# Patient Record
Sex: MALE | Race: WHITE | Employment: OTHER | URBAN - METROPOLITAN AREA
[De-identification: names, ages, dates, MRNs, and addresses within clinical notes are randomized per-mention and may not be internally consistent; named-entity substitution may affect disease eponyms.]

---

## 2012-08-16 LAB — COLONOSCOPY, EXTERNAL: NORMAL

## 2017-01-05 ENCOUNTER — OFFICE VISIT (OUTPATIENT)
Dept: NEUROLOGY | Age: 67
End: 2017-01-05

## 2017-01-05 DIAGNOSIS — R41.3 MEMORY LOSS: Primary | ICD-10-CM

## 2017-01-05 DIAGNOSIS — F43.21 ADJUSTMENT DISORDER WITH DEPRESSED MOOD: ICD-10-CM

## 2017-01-05 DIAGNOSIS — F10.11 HISTORY OF ALCOHOL ABUSE: ICD-10-CM

## 2017-01-05 NOTE — PROGRESS NOTES
[address] [facility],5Th Floor  15[address] [facility]   [facility] [address] [facility]   Jaswant Villagran    [phone] Office   [phone] Fax      Neuropsychology    Initial Diagnostic Interview Note      Referral:  Linda Cuenca MD, Dr. Laura Rosen. is a 77 y.o.  right handed   male who was unaccompanied to the initial clinical interview on 1/5/17. Please refer to his medical records for details pertaining to his history. Briefly, the patient reported that he completed high school and is a prior service [employer]. Left school early, went into the [employer] about age 16, and took some courses. He obtained high school diploma. For about a year now that he is aware, he has noticed increased forgetfulness getting worse over time. Loss of memory. He has had periods where he feels buzzed out. Saw Ely Gregory, did MMSE, and his recall was only 1/3. Either he misunderstood what he was supposed to do or just forgot. He doesn't know. He says he only remembered the first word. He does not recall specifics, thinks it was an activity. Periods of time when he feels aggravated, embarrassed by it. Doing things at home, he will forget his honey-do list.  Juan C Hones melancholy about it. Lost $2000.00. Lost a small gun/pistol that he had, and took a month to find that. He forgets certain events. Couldn't remember if he has had any major known neurologic problems. Has gotten banged up in his head, didn't bleed or lose consciousness. This happened maybe a month ago? Has some pain, arthritis issues. Forgets what he's supposed to do. Repeats himself. Says his driving is not a problem (but see Dr. Rafia Jacobson notes copied below). Denied ever getting lost when driving or turned around. Denied accidents. He gets assistance with medications, and spouse has always done the finances. Feels off balance sometimes. Has had some falls. Appetite isn't too bad. Emotionally, he reports doing not bad, not like before. Not on any meds for mood. No counseling/psychiatrist currently. Sleep is an issue. Not sleeping in same bed with spouse. Will watch tv, nap, up and down at night. History somewhat difficult to obtain, so I reviewed also Dr. Yanet Miramontes' notes, which are copied below. From Dr. Yanet Miramontes: 70-year-old right-handed gentleman presents for evaluation today accompanied by his wife for evaluation of what he calls memory loss. He says he started to have trouble about 2-2.5 years ago and he believes things have gotten progressively worse. He says that his stepfather had dementia and he is seeing the same types of things and himself as he saw on his stepfather. He says he can remember long-term event such as things that occurred in [facility] but he has difficulty remembering short-term things. He has been evaluated with Chadd Leon and he describes having a Mini-Mental status exam specifically a mini cog and notes that he had difficulty with the item recall only recalling 1 out of 3 items. His wife tells me that he repeats the same question over and over. He will tell the same story over and over. He will forget conversations. She relates the example of them going somewhere and then discussing it but then him asking over and over where they were going. He continues to drive. No issues with accidents or near accidents. He was supposed to be over at a veterans event on the [address] [place] [address] on [facility]. His wife says that this is an area of town to go to a lot because there is a restaurant on [address] where they like to eat. He was driving there and his wife actually put the directions into his phone for him as well and he was unable to find [facility]. He called a friend of his who was already there and still could not find his way. He also notes that in the middle of conversations he will forget what he is saying. He says that he misplaces items frequently as well.  Terms of family history of dementia he does not know anything about his father's side of the family. He did not know his father at all. His mother as above was said to have some psychiatric issues. Essentially family history is not known in this regard. MMSE:  Recall 1/3 as with Ms. Jace Garcia 3/5    Clock: slow to complete, hands set incorrectly    TOPF:  52    No previous neuropsych     Historian: Fair  Praxis: No UE apraxia  R/L Orientation: Intact to self and to other  Dress: within normal limits   Weight: within normal limits   Appearance/Hygiene: within normal limits   Gait: within normal limits   Assistive Devices: Glasses  Mood: within normal limits   Affect: within normal limits   Comprehension: mildly impaired  Thought Process: He is tangential  Expressive Language: within normal limits   Receptive Language: within normal limits   Motor:  No cognitive or motor perseveration  ETOH: Does not drink anymore, then later says he gets a beer every so often  Had a period of time for 3-4 years when he quit drinking, he had a drinking problem, right around the time he retired. History of ETOH abuse in the past also, related to trauma - he says, [place] issues.   No hard liquor  Tobacco: 1 ppd  Illicit: Denied  SI/HI: Denied  Psychosis: Denied  Insight: Fair  Judgment:  TBD  Other Psych:      Past Medical History   Diagnosis Date    [facility] ([facility]     CAD (coronary artery disease) 6/15/2010    DM (diabetes mellitus) ([facility] 6/15/2010    Falls     Heart disease     HTN (hypertension) 6/15/2010    Hypercholesteremia 6/15/2010    PTSD (post-traumatic stress disorder)     Skin cancer 6/15/2010    Smoker 6/15/2010    Snoring        Past Surgical History   Procedure Laterality Date    Pr cardiac surg procedure unlist       stent       No Known Allergies    Family History   Problem Relation Age of Onset    Cancer Mother     Heart Disease Other        Social History   Substance Use Topics    Smoking status: Current Every Day Smoker     Packs/day: 1.00    Smokeless tobacco: Never Used    Alcohol use 2.4 oz/week     4 Cans of beer per week       Current Outpatient Prescriptions   Medication Sig Dispense Refill    DOCOSAHEXANOIC ACID/EPA (FISH OIL PO) Take 1,200 mg by mouth daily.  PV W-O LAZARUS/FERROUS FUMARATE/FA (M-VIT PO) Take  by mouth.  POTASSIUM PO Take 550 mg by mouth.  rosuvastatin (CRESTOR) 20 mg tablet Take 1 Tab by mouth daily. 90 Tab 3    metoprolol succinate (TOPROL XL) 50 mg XL tablet Take 1 Tab by mouth daily. 90 Tab 3    niacin ER (NIASPAN) 500 mg tablet Take 1 Tab by mouth nightly. 90 Tab 3    OMEGA-3 FATTY ACIDS (OMEGA 3 PO) Take  by mouth.  aspirin (ASPIRIN) 325 mg tablet Take 325 mg by mouth daily. Plan:  Obtain authorization for testing from insurance company. Report to follow once testing, scoring, and interpretation completed. ? Organic based neurocognitive issues versus mood disorder or combination of same. ? Problems organic, functional, or both? This note will not be viewable in [address]

## 2017-01-19 ENCOUNTER — OFFICE VISIT (OUTPATIENT)
Dept: NEUROLOGY | Age: 67
End: 2017-01-19

## 2017-01-19 DIAGNOSIS — F10.11 HISTORY OF ALCOHOL ABUSE: ICD-10-CM

## 2017-01-19 DIAGNOSIS — F02.80 EARLY ONSET ALZHEIMER'S DEMENTIA WITHOUT BEHAVIORAL DISTURBANCE (HCC): ICD-10-CM

## 2017-01-19 DIAGNOSIS — G30.0 EARLY ONSET ALZHEIMER'S DEMENTIA WITHOUT BEHAVIORAL DISTURBANCE (HCC): ICD-10-CM

## 2017-01-19 DIAGNOSIS — F41.1 GENERALIZED ANXIETY DISORDER: ICD-10-CM

## 2017-01-19 DIAGNOSIS — R41.3 MEMORY LOSS: Primary | ICD-10-CM

## 2017-01-25 NOTE — PROGRESS NOTES
[address] [facility],5Th Floor  [address] [facility]   [address] [facility] [address] [facility]   Jaswant Villagran   [phone] Office   [phone] Fax      Neuropsychological Evaluation Report      Referral:  Lake Solorio MD, Dr. Yevgeniy Banda. is a 77 y.o.  right handed   male who was unaccompanied to the initial clinical interview on 1/5/17. Please refer to his medical records for details pertaining to his history. Briefly, the patient reported that he completed high school and is a prior service [employer]. Left school early, went into the [employer] about age 16, and took some courses. He obtained high school diploma. For about a year now that he is aware, he has noticed increased forgetfulness getting worse over time. Loss of memory. He has had periods where he feels buzzed out. Saw Chacorta Hoffman, did MMSE, and his recall was only 1/3. Either he misunderstood what he was supposed to do or just forgot. He doesn't know. He says he only remembered the first word. He does not recall specifics, thinks it was an activity. Periods of time when he feels aggravated, embarrassed by it. Doing things at home, he will forget his honey-do list.  Dirk Never melancholy about it. Lost $2000.00. Lost a small gun/pistol that he had, and took a month to find that. He forgets certain events. Couldn't remember if he has had any major known neurologic problems. Has gotten banged up in his head, didn't bleed or lose consciousness. This happened maybe a month ago? Has some pain, arthritis issues. Forgets what he's supposed to do. Repeats himself. Says his driving is not a problem (but see Dr. Johana Howard notes copied below). Denied ever getting lost when driving or turned around. Denied accidents. He gets assistance with medications, and spouse has always done the finances. Feels off balance sometimes. Has had some falls. Appetite isn't too bad.  Emotionally, he reports doing not bad, not like before. Not on any meds for mood. No counseling/psychiatrist currently. Sleep is an issue. Not sleeping in same bed with spouse. Will watch tv, nap, up and down at night. History somewhat difficult to obtain, so I reviewed also Dr. Joselo Del Rosario' notes, which are copied below. From Dr. Joselo Del Rosario: 80-year-old right-handed gentleman presents for evaluation today accompanied by his wife for evaluation of what he calls memory loss. He says he started to have trouble about 2-2.5 years ago and he believes things have gotten progressively worse. He says that his stepfather had dementia and he is seeing the same types of things and himself as he saw on his stepfather. He says he can remember long-term event such as things that occurred in [facility] but he has difficulty remembering short-term things. He has been evaluated with Theresa Cabrera and he describes having a Mini-Mental status exam specifically a mini cog and notes that he had difficulty with the item recall only recalling 1 out of 3 items. His wife tells me that he repeats the same question over and over. He will tell the same story over and over. He will forget conversations. She relates the example of them going somewhere and then discussing it but then him asking over and over where they were going. He continues to drive. No issues with accidents or near accidents. He was supposed to be over at a veterans event on the [address] [place] [address] on [facility]. His wife says that this is an area of town to go to a lot because there is a restaurant on [address] where they like to eat. He was driving there and his wife actually put the directions into his phone for him as well and he was unable to find [facility]. He called a friend of his who was already there and still could not find his way. He also notes that in the middle of conversations he will forget what he is saying. He says that he misplaces items frequently as well.  Terms of family history of dementia he does not know anything about his father's side of the family. He did not know his father at all. His mother as above was said to have some psychiatric issues. Essentially family history is not known in this regard. MMSE:  Recall 1/3 as with Ms. Bartholomew Bowels 3/5    Clock: slow to complete, hands set incorrectly    TOPF:  52    No previous neuropsych     Historian: Fair  Praxis: No UE apraxia  R/L Orientation: Intact to self and to other  Dress: within normal limits   Weight: within normal limits   Appearance/Hygiene: within normal limits   Gait: within normal limits   Assistive Devices: Glasses  Mood: within normal limits   Affect: within normal limits   Comprehension: mildly impaired  Thought Process: He is tangential  Expressive Language: within normal limits   Receptive Language: within normal limits   Motor:  No cognitive or motor perseveration  ETOH: Does not drink anymore, then later says he gets a beer every so often  Had a period of time for 3-4 years when he quit drinking, he had a drinking problem, right around the time he retired. History of ETOH abuse in the past also, related to trauma - he says, [place] issues.   No hard liquor  Tobacco: 1 ppd  Illicit: Denied  SI/HI: Denied  Psychosis: Denied  Insight: Fair  Judgment:  TBD  Other Psych:      Past Medical History   Diagnosis Date    [facility]ism ([facility] [address]     CAD (coronary artery disease) 6/15/2010    DM (diabetes mellitus) ([facility] 6/15/2010    Falls     Heart disease     HTN (hypertension) 6/15/2010    Hypercholesteremia 6/15/2010    PTSD (post-traumatic stress disorder)     Skin cancer 6/15/2010    Smoker 6/15/2010    Snoring        Past Surgical History   Procedure Laterality Date    Pr cardiac surg procedure unlist       stent       No Known Allergies    Family History   Problem Relation Age of Onset    Cancer Mother     Heart Disease Other        Social History   Substance Use Topics    Smoking status: Current Every Day Smoker     Packs/day: 1.00    Smokeless tobacco: Never Used    Alcohol use 2.4 oz/week     4 Cans of beer per week       Current Outpatient Prescriptions   Medication Sig Dispense Refill    DOCOSAHEXANOIC ACID/EPA (FISH OIL PO) Take 1,200 mg by mouth daily.  PV W-O LAZARUS/FERROUS FUMARATE/FA (M-VIT PO) Take  by mouth.  POTASSIUM PO Take 550 mg by mouth.  rosuvastatin (CRESTOR) 20 mg tablet Take 1 Tab by mouth daily. 90 Tab 3    metoprolol succinate (TOPROL XL) 50 mg XL tablet Take 1 Tab by mouth daily. 90 Tab 3    niacin ER (NIASPAN) 500 mg tablet Take 1 Tab by mouth nightly. 90 Tab 3    OMEGA-3 FATTY ACIDS (OMEGA 3 PO) Take  by mouth.  aspirin (ASPIRIN) 325 mg tablet Take 325 mg by mouth daily. Plan:  Obtain authorization for testing from insurance company. Report to follow once testing, scoring, and interpretation completed. ? Organic based neurocognitive issues versus mood disorder or combination of same. ? Problems organic, functional, or both? This note will not be viewable in [address] Neuropsychological Test Results  Patient Testing 1/19/17 Report Completed 1/25/17  A Psychometrist Assisted w/ portions of this evaluation while under my direct  supervision    The following evaluation procedures/tests were administered:      Neuropsychologist Administered/Interpreted:  Neuropsychological Mental Status Exam, Revised Memory & Behavior Checklist,  Mini Mental Status Exam, Clock Drawing Test, Test Of Premorbid Functioning, Elise-Melzack Pain Questionnaire, History Taking  & Clinical Interview With The Patient, Review Of Available Records.     Psychometrist Administered under Neuropsychologist Supervision & Neuropsychologist Interpreted:  Verbal Fluency Tests, Emerson & Emerson - Revised, Trailmaking Test Parts A & B, Wechsler Adult Intelligence Scale - IV, Sun Continuous Performance Test - III, [place] [employer] - II, Grooved Pegboard, Garza Depression Inventory - II, Garza Anxiety Inventory, Personality Assessment Inventory. Test Findings:  Test Findings:  Note:  The patients raw data have been compared with currently available norms which include demographic corrections for age, gender, and/or education. Sometimes, the patients scores are compared to demographically similar individuals as close to the patients age, education level, etc., as possible. \"Average\" is viewed as being +/- 1 standard deviation (SD) from the stated mean for a particular test score. \"Low average\" is viewed as being between 1 and 2 SD below the mean, and above average is viewed as being 1 and 2 SD above the mean. Scores falling in the borderline range (between 1-1/2 and 2 SD below the mean) are viewed with particular attention as to whether they are normal or abnormal neurocognitive test scores. Other methods of inference in analyzing the test data are also utilized, including the pattern and range of scores in the profile, bilateral motor functions, and the presence, if any, of pathognomonic signs. Behaviorally, the patient was friendly and cooperative and appeared motivated to perform well during this examination. Within this context, the results of this evaluation are viewed as a valid reflection of the patients actual neurocognitive and emotional status. His MMSE score of 16/30 correct was impaired. In this regard, he was not oriented to date or county. Registration of three words was 0/3 correct on Trial 1. Serial 7s were 1/5 correct. Recall for three words after a brief delay was 1/3 correct. Comprehension and carry out of a three step command was 2/3 correct. Reading was impaired. Clock drawing was impaired (scanned into media section of this EMR). His structured word list fluency, as assessed by the FAS Test, was within the severely impaired range with a T score of 19.   Category fluency was within the moderately to severely impaired range with a T score of 24. Confrontation naming ability, as assessed by the [facility] - Revised, was within the moderately impaired range at 32/60 correct (T = 29). This pattern of performance is indicative of a patient who is at increased risk for day-to-day problems with verbal fluency and confrontation naming. The patient was administered the Sun Continuous Performance Test - III,a computer administered test of sustained attention, and review of the subscales within this instrument revealed mild concerns for inattentiveness without impulsivity. This pattern of performance is indicative of a patient who is at increased risk for day-to-day problems with sustained visual attention/concentration. The patient is showing problems with working memory capacity (1st  %ile) and especially processing speed (0.2nd %ile) on the WAIS-IV. His Verbal Comprehension Index score of 74 was borderline. His Perceptual Reasoning Index score of 71 was also borderline. These scores reflect a decline in functioning based on an assessment of premorbid functioning. The patient was administered the [address] Test  - II and generated an impaired range (and flat) learning curve over five repeated auditory word list learning trials. An interference trial was within the normal range. Free and cued, short and long delayed recall were all impaired. Recognition recall was impaired, as was his forced choice recall (borderline). No concern for malingering, however. This pattern of performance is indicative of a patient who is at increased risk for day-to-day problems with auditory learning and memory. Simple timed visual motor sequencing (Trailmaking Test Part A) was within the moderately to severely impaired range with a T score of 21.   His performance on a similar, but more complex task of timed visual motor sequencing (Trailmaking Test Part B) was within the severely impaired range with a T score of 17. This latter test was discontinued. Taken together, this pattern of performance is indicative of a patient who is at increased risk for day-to-day problems with executive functioning. Fine motor dexterity was within the mildly to moderately impaired range for his dominant hand and normal for his nondominant hand. This potentially reflects a lateralized issue for which neurologic correlation is indicated. The patient rated his current level of pain as \"0/5 - No Pain\" on the Elise-Melzack Pain Questionnaire. His Garza Depression Inventory- II score of 5 was within the normal range. His Garza Anxiety Inventory score of 12 reflected mild anxiety. The patient was administered the Personality Assessment Inventory and generated a valid profile for interpretation. Within this context, this is a fairly normal profile, though mild anxiety is present and he reports that alcohol use has been a source of difficulty for him in his life. The personality profile is otherwise normal.  .      Impressions & Recommendations: This patient generated an abnormal range Neuropsychological Evaluation with respect to neurocognitive functioning. In this regard, he is showing problems with mental status, verbal fluency, confrontation naming, sustained attention, processing speed, working memory, perceptual reasoning, verbal comprehension, dominant handed motor dexterity (a potentially lateralized issue for which neurologic correlation is indicated) auditory learning, auditory memory, and executive functioning. Casual language skills will serve to mask underlying cognitive deficits at times. Emotionally, the patient reported mild anxiety. He reports a history of alcohol abuse when younger as well. In my opinion, this profile is consistent with an evolving organic process that is currently at a mild to moderate level of severity. This is likely a combination of AD and vascular dementia.   I do not see this as FTD. In addition to continued medical care, my recommendations include consideration for memory management medication. I also recommend psychiatric medication management for anxiety. The patient should be encouraged to remain as mentally, socially, and physically active as possible. The patient's generated cognitive difficulties are significant to the degree whereby it is my opinion that he should not live independently without appropriate supervision for those domains with a heavy memory emphasis. This includes medication management supervision and supervision of financial dealings. We will need to monitor driving safety over time, and he should use a GPS device in the interim. I do not believe that he is competent to manage his own affairs at this time. Baseline now established. Follow up prn. Clinical correlation is, of course, indicated. I will discuss these findings with the patient and family when they follow up with me in the near future. A follow up Neuropsychological Evaluation is indicated on a prn basis. DIAGNOSES: Dementia - Mild To Moderate    Anxiety - Mild     The above information is based upon information currently available to me. If there is any additional information of which I am currently unaware, I would be more than happy to review it upon having it made available to me. Thank you for the opportunity to see this interesting individual.     Sincerely,       Connie Vigil. Vero Pichardo PsyD, EdS      Attachments:  IQ Test Results (In Media Section Of This EMR)    Cc: Grace Menard MD, Dr. Yanet Miramontes    2 [id number]  Record review. Review of history provided by patient. Review of collaborative information. Testing by Clinician. Review of raw data. Scoring. Report writing of individual tests administered by Clinician.   Integration of individual tests administered by psychometrist (that were previously reported and billed under psychometry code below) with testing by clinician and review of records/history/collaborative information. Case Conceptualization, Report writing. Coordination Of Care. 4 units  -[id number] - Psychometrist test prep, administration, and scoring under clinician's direct supervision. Clinical interpretation of individual tests administered by psychometrist .  Clinician report of individual tests administered by psychometrist.    \"Unit\" is defined by CPT/National Guidelines (31 - 60 minutes). Integral services including scoring of raw data, data interpretation, case conceptualization, report writing etcetera were initiated after the patient finished testing/raw data collected and was completed on the date the report was signed.

## 2017-02-08 ENCOUNTER — OFFICE VISIT (OUTPATIENT)
Dept: NEUROLOGY | Age: 67
End: 2017-02-08

## 2017-02-08 ENCOUNTER — TELEPHONE (OUTPATIENT)
Dept: MEDSURG UNIT | Age: 67
End: 2017-02-08

## 2017-02-08 VITALS
HEIGHT: 73 IN | WEIGHT: 185.2 LBS | HEART RATE: 63 BPM | OXYGEN SATURATION: 98 % | DIASTOLIC BLOOD PRESSURE: 72 MMHG | SYSTOLIC BLOOD PRESSURE: 120 MMHG | BODY MASS INDEX: 24.55 KG/M2 | RESPIRATION RATE: 18 BRPM

## 2017-02-08 DIAGNOSIS — F03.A0 MILD DEMENTIA: ICD-10-CM

## 2017-02-08 DIAGNOSIS — F03.A0 MILD DEMENTIA: Primary | ICD-10-CM

## 2017-02-08 DIAGNOSIS — I65.23 BILATERAL CAROTID ARTERY STENOSIS: Primary | ICD-10-CM

## 2017-02-08 RX ORDER — DONEPEZIL HYDROCHLORIDE 5 MG/1
TABLET, FILM COATED ORAL
Qty: 30 TAB | Refills: 0 | Status: SHIPPED | OUTPATIENT
Start: 2017-02-08 | End: 2017-04-07

## 2017-02-08 RX ORDER — DONEPEZIL HYDROCHLORIDE 10 MG/1
10 TABLET, FILM COATED ORAL
Qty: 30 TAB | Refills: 5 | Status: SHIPPED | OUTPATIENT
Start: 2017-02-08 | End: 2017-04-07

## 2017-02-08 NOTE — MR AVS SNAPSHOT
Visit Information Date & Time Provider Department Dept. Phone Encounter #  
 2/8/2017  9:00 AM Sohail Dukes NP [facility] [phone] [id number] Follow-up Instructions Return in about 2 months (around 4/8/2017). Upcoming Health Maintenance Date Due Hepatitis C Screening 1950 DTaP/Tdap/Td series (1 - Tdap) 4/1/1971 ZOSTER VACCINE AGE 60> 4/1/2010 HEMOGLOBIN A1C Q6M 5/24/2016 COLONOSCOPY 6/21/2016 MEDICARE YEARLY EXAM 11/24/2016 FOOT EXAM Q1 11/24/2016 MICROALBUMIN Q1 11/24/2016 LIPID PANEL Q1 11/24/2016 Pneumococcal 65+ High/Highest Risk (2 of 2 - PPSV23) 12/20/2016 EYE EXAM RETINAL OR DILATED Q1 4/7/2017 GLAUCOMA SCREENING Q2Y 4/7/2018 Allergies as of 2/8/2017  Review Complete On: 2/8/2017 By: Bartolo Abrams LPN No Known Allergies Current Immunizations  Reviewed on 11/24/2015 Name Date Influenza Vaccine 9/22/2016, 10/5/2014 Influenza Vaccine Split 11/26/2012, 12/20/2011 Influenza Vaccine Whole 12/20/2011 Pneumococcal Conjugate (PCV-13) 11/24/2015 Pneumococcal Vaccine (Unspecified Type) 12/20/2011, 12/20/2011 Not reviewed this visit You Were Diagnosed With   
  
 Codes Comments Mild dementia    -  Primary ICD-10-CM: F03.90 ICD-9-CM: 294.20 Vitals BP Pulse Resp Height(growth percentile) Weight(growth percentile) SpO2  
 120/72 63 18 6' 1\" (1.854 m) 185 lb 3.2 oz (84 kg) 98% BMI Smoking Status 24.43 kg/m2 Current Every Day Smoker BMI and BSA Data Body Mass Index Body Surface Area  
 24.43 kg/m 2 2.08 m 2 Preferred Pharmacy Pharmacy Name Phone [facility] [address], [address] [phone] Your Updated Medication List  
  
   
This list is accurate as of: 2/8/17  9:30 AM.  Always use your most recent med list.  
  
  
  
  
 aspirin 325 mg tablet Commonly known as:  ASPIRIN Take 325 mg by mouth daily. * donepezil 5 mg tablet Commonly known as:  ARICEPT Take 1 tablet by mouth nightly * donepezil 10 mg tablet Commonly known as:  ARICEPT Take 1 Tab by mouth nightly. FISH OIL PO Take 1,200 mg by mouth daily. M-VIT PO Take  by mouth.  
  
 metoprolol succinate 50 mg XL tablet Commonly known as:  TOPROL XL Take 1 Tab by mouth daily. niacin  mg tablet Commonly known as:  Niaspan Take 1 Tab by mouth nightly. OMEGA 3 PO Take  by mouth. POTASSIUM PO Take 550 mg by mouth. rosuvastatin 20 mg tablet Commonly known as:  CRESTOR Take 1 Tab by mouth daily. * Notice: This list has 2 medication(s) that are the same as other medications prescribed for you. Read the directions carefully, and ask your doctor or other care provider to review them with you. Prescriptions Sent to Pharmacy Refills  
 donepezil (ARICEPT) 5 mg tablet 0 Sig: Take 1 tablet by mouth nightly Class: Normal  
 Pharmacy: [facility] [address] [place] [address] [address] AT [facility] Ph #: [phone]  
 donepezil (ARICEPT) 10 mg tablet 5 Sig: Take 1 Tab by mouth nightly. Class: Normal  
 Pharmacy: [facility] [address] Ph #: [phone] Route: Oral  
  
Follow-up Instructions Return in about 2 months (around 4/8/2017). To-Do List   
 02/08/2017 Imaging:  DUPLEX CAROTID BILATERAL AMB NEURO Patient Instructions PRESCRIPTION REFILL POLICY [facility] Statement to Patients April 1, 2014 In an effort to ensure the large volume of patient prescription refills is processed in the most efficient and expeditious manner, we are asking our patients to assist us by calling your Pharmacy for all prescription refills, this will include also your  Mail Order Pharmacy. The pharmacy will contact our office electronically to continue the refill process. Please do not wait until the last minute to call your pharmacy. We need at least 48 hours (2days) to fill prescriptions. We also encourage you to call your pharmacy before going to  your prescription to make sure it is ready. With regard to controlled substance prescription refill requests (narcotic refills) that need to be picked up at our office, we ask your cooperation by providing us with at least 72 hours (3days) notice that you will need a refill. We will not refill narcotic prescription refill requests after 4:00pm on any weekday, Monday through Thursday, or after 2:00pm on Fridays, or on the weekends. We encourage everyone to explore another way of getting your prescription refill request processed using Earth[employer], our patient web portal through our electronic medical record system. EarthLink is an efficient and effective way to communicate your medication request directly to the office and  downloadable as an kevin on your smart phone . EarthLink also features a review functionality that allows you to view your medication list as well as leave messages for your physician. Are you ready to get connected? If so please review the attatched instructions or speak to any of our staff to get you set up right away! Thank you so much for your cooperation. Should you have any questions please contact our Practice Administrator. The Physicians and Staff,  [facility] Lidia Arguelles [place] [address] What is a living will? A living will is a legal form you use to write down the kind of care you want at the end of your life. It is used by the health professionals who will treat you if you aren't able to decide for yourself.  
If you put your wishes in writing, your loved ones and others will know what kind of care you want. They won't need to guess. This can ease your mind and be helpful to others. A living will is not the same as an estate or property will. An estate will explains what you want to happen with your money and property after you die. Is a living will a legal document? A living will is a legal document. Each state has its own laws about living luther. If you move to another state, make sure that your living will is legal in the state where you now live. Or you might use a universal form that has been approved by many states. This kind of form can sometimes be completed and stored online. Your electronic copy will then be available wherever you have a connection to the Internet. In most cases, doctors will respect your wishes even if you have a form from a different state. · You don't need an  to complete a living will. But legal advice can be helpful if your state's laws are unclear, your health history is complicated, or your family can't agree on what should be in your living will. · You can change your living will at any time. Some people find that their wishes about end-of-life care change as their health changes. · In addition to making a living will, think about completing a medical power of  form. This form lets you name the person you want to make end-of-life treatment decisions for you (your \"health care agent\") if you're not able to. Many hospitals and nursing homes will give you the forms you need to complete a living will and a medical power of . · Your living will is used only if you can't make or communicate decisions for yourself anymore. If you become able to make decisions again, you can accept or refuse any treatment, no matter what you wrote in your living will. · Your state may offer an online registry. This is a place where you can store your living will online so the doctors and nurses who need to treat you can find it right away. What should you think about when creating a living will? Talk about your end-of-life wishes with your family members and your doctor. Let them know what you want. That way the people making decisions for you won't be surprised by your choices. Think about these questions as you make your living will: · Do you know enough about life support methods that might be used? If not, talk to your doctor so you know what might be done if you can't breathe on your own, your heart stops, or you're unable to swallow. · What things would you still want to be able to do after you receive life-support methods? Would you want to be able to walk? To speak? To eat on your own? To live without the help of machines? · If you have a choice, where do you want to be cared for? In your home? At a hospital or nursing home? · Do you want certain Yazidism practices performed if you become very ill? · If you have a choice at the end of your life, where would you prefer to die? At home? In a hospital or nursing home? Somewhere else? · Would you prefer to be buried or cremated? · Do you want your organs to be donated after you die? What should you do with your living will? · Make sure that your family members and your health care agent have copies of your living will. · Give your doctor a copy of your living will to keep in your medical record. If you have more than one doctor, make sure that each one has a copy. · You may want to put a copy of your living will where it can be easily found. Where can you learn more? Go to http://olu-vick.info/. Enter U604 in the search box to learn more about \"Learning About Living [place]. \" Current as of: February 24, 2016 Content Version: 11.1 © 3028-0716 [employer]. Care instructions adapted under license by [employer] (which disclaims liability or warranty for this information).  If you have questions about a medical condition or this instruction, always ask your healthcare professional. [place] [address] any warranty or liability for your use of this information. Introducing [facility]! Samanta Mccallum introduces [employer] patient portal. Now you can access parts of your medical record, email your doctor's office, and request medication refills online. 1. In your internet browser, go to [email or web address] 2. Click on the First Time User? Click Here link in the Sign In box. You will see the New Member Sign Up page. 3. Enter your [employer] Access Code exactly as it appears below. You will not need to use this code after youve completed the sign-up process. If you do not sign up before the expiration date, you must request a new code. · [employer] Access Code: [email or web address] Expires: 3/2/2017  8:00 AM 
 
4. Enter the last four digits of your Social Security Number (xxxx) and Date of Birth (mm/dd/yyyy) as indicated and click Submit. You will be taken to the next sign-up page. 5. Create a A[employer]Energy ID. This will be your A[employer] login ID and cannot be changed, so think of one that is secure and easy to remember. 6. Create a [employer] password. You can change your password at any time. 7. Enter your Password Reset Question and Answer. This can be used at a later time if you forget your password. 8. Enter your e-mail address. You will receive e-mail notification when new information is available in [address] 9. Click Sign Up. You can now view and download portions of your medical record. 10. Click the Download Summary menu link to download a portable copy of your medical information. If you have questions, please visit the Frequently Asked Questions section of the [employer] website. Remember, [employer] is NOT to be used for urgent needs. For medical emergencies, dial 911. Now available from your iPhone and Android! Please provide this summary of care documentation to your next provider. Your primary care clinician is listed as TATIANA BRAUN. If you have any questions after today's visit, please call [phone].

## 2017-02-08 NOTE — PROGRESS NOTES
Frieda Maldonado is a 77 y.o. male who presents with the following  Chief Complaint   Patient presents with    Results     MRI, Neuropsych       HPI Patient comes in with wife for a follow up for MRI and neuropsych testing. He states he is still having some trouble day to day with his short term memory. He is still driving and cooking without any trouble. Remembering how to get to certain places and not forgetting what to get. Is remembering to take his medications. Wife agrees and states it is more day to day things. No safety concerns. Not any better or worse   No Known Allergies    Current Outpatient Prescriptions   Medication Sig    donepezil (ARICEPT) 5 mg tablet Take 1 tablet by mouth nightly    donepezil (ARICEPT) 10 mg tablet Take 1 Tab by mouth nightly.  DOCOSAHEXANOIC ACID/EPA (FISH OIL PO) Take 1,200 mg by mouth daily.  PV W-O LAZARUS/FERROUS FUMARATE/FA (M-VIT PO) Take  by mouth.  POTASSIUM PO Take 550 mg by mouth.  rosuvastatin (CRESTOR) 20 mg tablet Take 1 Tab by mouth daily.  metoprolol succinate (TOPROL XL) 50 mg XL tablet Take 1 Tab by mouth daily.  niacin ER (NIASPAN) 500 mg tablet Take 1 Tab by mouth nightly.  OMEGA-3 FATTY ACIDS (OMEGA 3 PO) Take  by mouth.  aspirin (ASPIRIN) 325 mg tablet Take 325 mg by mouth daily. No current facility-administered medications for this visit.         History   Smoking Status    Current Every Day Smoker    Packs/day: 1.00   Smokeless Tobacco    Never Used       Past Medical History   Diagnosis Date    Alcoholism ([facility] [address]     CAD (coronary artery disease) 6/15/2010    DM (diabetes mellitus) ([facility] [address] 6/15/2010    Falls     Heart disease     HTN (hypertension) 6/15/2010    Hypercholesteremia 6/15/2010    PTSD (post-traumatic stress disorder)     Skin cancer 6/15/2010    Smoker 6/15/2010    Snoring        Past Surgical History   Procedure Laterality Date    Pr cardiac surg procedure unlist       stent       Family History Problem Relation Age of Onset    Cancer Mother     Heart Disease Other        Social History     Social History    Marital status:      Spouse name: N/A    Number of children: N/A    Years of education: N/A     Social History Main Topics    Smoking status: Current Every Day Smoker     Packs/day: 1.00    Smokeless tobacco: Never Used    Alcohol use 2.4 oz/week     4 Cans of beer per week    Drug use: No    Sexual activity: No     Other Topics Concern    None     Social History Narrative       Review of Systems   HENT: Negative for hearing loss and tinnitus. Eyes: Negative for blurred vision, double vision and photophobia. Respiratory: Negative for shortness of breath and wheezing. Cardiovascular: Negative for chest pain and palpitations. Gastrointestinal: Negative for nausea and vomiting. Neurological: Negative for seizures, loss of consciousness, weakness and headaches. Remainder of comprehensive review is negative. Physical Exam :    Visit Vitals    /72    Pulse 63    Resp 18    Ht 6' 1\" (1.854 m)    Wt 84 kg (185 lb 3.2 oz)    SpO2 98%    BMI 24.43 kg/m2               Results for orders placed or performed during the hospital encounter of 12/28/16   POC CREATININE   Result Value Ref Range    Creatinine (POC) 1.0 0.6 - 1.3 MG/DL    GFR-AA (POC) >60 >60 ml/min/1.73m2    GFR, non-AA (POC) >60 >60 ml/min/1.73m2       Orders Placed This Encounter    DUPLEX CAROTID BILATERAL AMB NEURO ([id number])     Standing Status:   Future     Number of Occurrences:   1     Standing Expiration Date:   2/8/2018     Order Specific Question:   Reason for Exam:     Answer:   memory loss    donepezil (ARICEPT) 5 mg tablet     Sig: Take 1 tablet by mouth nightly     Dispense:  30 Tab     Refill:  0    donepezil (ARICEPT) 10 mg tablet     Sig: Take 1 Tab by mouth nightly. Dispense:  30 Tab     Refill:  5       1.  Mild dementia        Follow-up Disposition:  Return in about 2 months (around 4/8/2017). MRI showed Mild to moderate chronic microvascular ischemic disease as above. Solitary focus  remote microhemorrhage inferior left temporal lobe. No acute abnormality. No  abnormal enhancement. Neuropsych testing showed mild dementia. We talked in depth about Dr. Juan Alberto Christina notes and why he felt this way. We went over his testing in full and he has no questions,. Explained the diagnosis further and what causes this and how the progression is. We talked about medications. We will start Aricept 5 mg and increase to 10 mg monthly after that. We talked about side effects. We talked about adding namzaric at a later date. Continue to stay active. No other concerns at this time. No other questions. Cruzito Rodríguez, RAYMOND        This note will not be viewable in [employer]      Total time: 40  min   Counseling / coordination time: 40min   > 50% counseling / coordination?: Yes re: diagnosis, medications, prognosis, treatments, stay active.

## 2017-02-08 NOTE — TELEPHONE ENCOUNTER
[facility]  pharmacy  would like to have call back from you regarding to pt med dosage. pt suppose to be which dosage for  (donepezil) 5 mg or 10 mg. Please can you give them call back.  Thank you

## 2017-02-08 NOTE — PROGRESS NOTES
Patient present for a follow up. Test results: MRI, Neuropsych. Reports symptoms have gotten a little worse since the last visit.

## 2017-02-08 NOTE — TELEPHONE ENCOUNTER
R/t call to pharmacy. Spoke with pharmacist Subha Covington. He stated they received 2 prescriptions for Aricept and needed to know which one should be filled. I acknowledged understanding and informed him the patient will be taking Aricept 5 mg for 1 month and then will be taking the Aricept 10 mg. He verbalized understanding.

## 2017-02-08 NOTE — PATIENT INSTRUCTIONS
[facility]   Statement to Patients  April 1, 2014      In an effort to ensure the large volume of patient prescription refills is processed in the most efficient and expeditious manner, we are asking our patients to assist us by calling your Pharmacy for all prescription refills, this will include also your  Mail Order Pharmacy. The pharmacy will contact our office electronically to continue the refill process. Please do not wait until the last minute to call your pharmacy. We need at least 48 hours (2days) to fill prescriptions. We also encourage you to call your pharmacy before going to  your prescription to make sure it is ready. With regard to controlled substance prescription refill requests (narcotic refills) that need to be picked up at our office, we ask your cooperation by providing us with at least 72 hours (3days) notice that you will need a refill. We will not refill narcotic prescription refill requests after 4:00pm on any weekday, Monday through Thursday, or after 2:00pm on Fridays, or on the weekends. We encourage everyone to explore another way of getting your prescription refill request processed using Not[employer], our patient web portal through our electronic medical record system. Notifixious is an efficient and effective way to communicate your medication request directly to the office and  downloadable as an kevin on your smart phone . Not[employer] also features a review functionality that allows you to view your medication list as well as leave messages for your physician. Are you ready to get connected? If so please review the attatched instructions or speak to any of our staff to get you set up right away! Thank you so much for your cooperation. Should you have any questions please contact our Practice Administrator.     The Physicians and Staff,  [facility]  What is a living will?  A living will is a legal form you use to write down the kind of care you want at the end of your life. It is used by the health professionals who will treat you if you aren't able to decide for yourself. If you put your wishes in writing, your loved ones and others will know what kind of care you want. They won't need to guess. This can ease your mind and be helpful to others. A living will is not the same as an estate or property will. An estate will explains what you want to happen with your money and property after you die. Is a living will a legal document? A living will is a legal document. Each state has its own laws about living luther. If you move to another state, make sure that your living will is legal in the state where you now live. Or you might use a universal form that has been approved by many states. This kind of form can sometimes be completed and stored online. Your electronic copy will then be available wherever you have a connection to the Internet. In most cases, doctors will respect your wishes even if you have a form from a different state. · You don't need an  to complete a living will. But legal advice can be helpful if your state's laws are unclear, your health history is complicated, or your family can't agree on what should be in your living will. · You can change your living will at any time. Some people find that their wishes about end-of-life care change as their health changes. · In addition to making a living will, think about completing a medical power of  form. This form lets you name the person you want to make end-of-life treatment decisions for you (your \"health care agent\") if you're not able to. Many hospitals and nursing homes will give you the forms you need to complete a living will and a medical power of . · Your living will is used only if you can't make or communicate decisions for yourself anymore.  If you become able to make decisions again, you can accept or refuse any treatment, no matter what you wrote in your living will. · Your state may offer an online registry. This is a place where you can store your living will online so the doctors and nurses who need to treat you can find it right away. What should you think about when creating a living will? Talk about your end-of-life wishes with your family members and your doctor. Let them know what you want. That way the people making decisions for you won't be surprised by your choices. Think about these questions as you make your living will:  · Do you know enough about life support methods that might be used? If not, talk to your doctor so you know what might be done if you can't breathe on your own, your heart stops, or you're unable to swallow. · What things would you still want to be able to do after you receive life-support methods? Would you want to be able to walk? To speak? To eat on your own? To live without the help of machines? · If you have a choice, where do you want to be cared for? In your home? At a hospital or nursing home? · Do you want certain Buddhist practices performed if you become very ill? · If you have a choice at the end of your life, where would you prefer to die? At home? In a hospital or nursing home? Somewhere else? · Would you prefer to be buried or cremated? · Do you want your organs to be donated after you die? What should you do with your living will? · Make sure that your family members and your health care agent have copies of your living will. · Give your doctor a copy of your living will to keep in your medical record. If you have more than one doctor, make sure that each one has a copy. · You may want to put a copy of your living will where it can be easily found. Where can you learn more? Go to http://olu-vick.info/. Enter E475 in the search box to learn more about \"Learning About Living [place]. \"  Current as of: February 24, 2016  Content Version: 11.1  © 7846-9874 [employer]. Care instructions adapted under license by [employer] (which disclaims liability or warranty for this information). If you have questions about a medical condition or this instruction, always ask your healthcare professional. [address] any warranty or liability for your use of this information.

## 2017-02-11 NOTE — PROCEDURES
Carotid Doppler:     Date:  02/08/17    Requesting Physician:  Louise Ochoa NP      Indication:  Stenosis. B-mode imaging reveals mixed plaque at the bifurcations extending into the proximal internal carotid artery segments bilaterally. Doppler spectral analysis reveals no significantly elevated velocities. Vertebral artery flow antegrade bilaterally. Interpretation:  Plaque as noted. Stenosis estimated at 16-49% bilateral ICA.

## 2017-04-07 ENCOUNTER — OFFICE VISIT (OUTPATIENT)
Dept: NEUROLOGY | Age: 67
End: 2017-04-07

## 2017-04-07 VITALS
SYSTOLIC BLOOD PRESSURE: 130 MMHG | DIASTOLIC BLOOD PRESSURE: 80 MMHG | WEIGHT: 185 LBS | HEIGHT: 73 IN | BODY MASS INDEX: 24.52 KG/M2 | RESPIRATION RATE: 20 BRPM

## 2017-04-07 DIAGNOSIS — F03.B0 MODERATE DEMENTIA, WITHOUT BEHAVIORAL DISTURBANCE: Primary | ICD-10-CM

## 2017-04-07 NOTE — PROGRESS NOTES
Elmer Zelaya is a 79 y.o. male who presents with the following  Chief Complaint   Patient presents with    Memory Loss       HPI Patient comes in for a follow up for memory loss. Accompanied with wife. Doing well on Aricept 10 mg and having no problems. 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 Likes to work on Minimally invasive devices. Used to do a lot of sudoku not so much anymore. Reading the newspaper daily and trying to stay active. Walks daily also. Driving without any issues and not getting lost or forgetting. He is remembering to take medications without any problems. He is noticing still having trouble day to day with mostly short term things. Forgetting names, dates, times. Nothing new. Sleeping and eating well. No Known Allergies    Current Outpatient Prescriptions   Medication Sig    memantine-donepezil (NAMZARIC) 28-10 mg CSpX Take 1 Cap by mouth daily.  DOCOSAHEXANOIC ACID/EPA (FISH OIL PO) Take 1,200 mg by mouth daily.  PV W-O LAZARUS/FERROUS FUMARATE/FA (M-VIT PO) Take  by mouth.  POTASSIUM PO Take 550 mg by mouth.  rosuvastatin (CRESTOR) 20 mg tablet Take 1 Tab by mouth daily.  metoprolol succinate (TOPROL XL) 50 mg XL tablet Take 1 Tab by mouth daily.  niacin ER (NIASPAN) 500 mg tablet Take 1 Tab by mouth nightly.  OMEGA-3 FATTY ACIDS (OMEGA 3 PO) Take  by mouth.  aspirin (ASPIRIN) 325 mg tablet Take 325 mg by mouth daily. No current facility-administered medications for this visit.         History   Smoking Status    Current Every Day Smoker    Packs/day: 1.00   Smokeless Tobacco    Never Used       Past Medical History:   Diagnosis Date    Alcoholism ([address]     CAD (coronary artery disease) 6/15/2010    DM (diabetes mellitus) ([facility] 6/15/2010    Falls     Heart disease     HTN (hypertension) 6/15/2010    Hypercholesteremia 6/15/2010    PTSD (post-traumatic stress disorder)     Skin cancer 6/15/2010    Smoker 6/15/2010    Snoring        Past Surgical History:   Procedure Laterality Date    CARDIAC SURG PROCEDURE UNLIST      stent       Family History   Problem Relation Age of Onset    Cancer Mother     Heart Disease Other        Social History     Social History    Marital status:      Spouse name: N/A    Number of children: N/A    Years of education: N/A     Social History Main Topics    Smoking status: Current Every Day Smoker     Packs/day: 1.00    Smokeless tobacco: Never Used    Alcohol use 2.4 oz/week     4 Cans of beer per week    Drug use: No    Sexual activity: No     Other Topics Concern    None     Social History Narrative       Review of Systems   HENT: Negative for hearing loss and tinnitus. Eyes: Negative for blurred vision, double vision and photophobia. Respiratory: Negative for shortness of breath and wheezing. Cardiovascular: Negative for chest pain and palpitations. Gastrointestinal: Negative for nausea and vomiting. Neurological: Negative for dizziness, tingling, tremors, weakness and headaches. Psychiatric/Behavioral: Positive for memory loss. Negative for hallucinations. The patient does not have insomnia. Remainder of comprehensive review is negative. Physical Exam :    Visit Vitals    /80    Resp 20    Ht 6' 1\" (1.854 m)    Wt 83.9 kg (185 lb)    BMI 24.41 kg/m2       General: Well defined, nourished, and groomed individual in no acute distress.    Neck: Supple, nontender, no bruits, no pain with resistance to active range of motion.    Heart: Regular rate and rhythm, no murmurs, rub, or gallop. Normal S1S2. Lungs: Clear to auscultation bilaterally with equal chest expansion, no cough, no wheeze  Musculoskeletal: Extremities revealed no edema and had full range of motion of joints.    Psych: Good mood and bright affect    NEUROLOGICAL EXAMINATION:    Mental Status: Alert and oriented to person, place, and time.  MMSE 26     Cranial Nerves:    II, III, IV, VI: Visual acuity grossly intact. Visual fields are normal.    Pupils are equal, round, and reactive to light and accommodation.    Extra-ocular movements are full and fluid. Fundoscopic exam was benign, no ptosis or nystagmus.    V-XII: Hearing is grossly intact. Facial features are symmetric, with normal sensation and strength. The palate rises symmetrically and the tongue protrudes midline. Sternocleidomastoids 5/5. Motor Examination: Normal tone, bulk, and strength, 5/5 muscle strength throughout. Coordination: Finger to nose was normal. No resting or intention tremor    Gait and Station: Steady while walking. Normal arm swing. No pronator drift. No muscle wasting or fasiculations noted. Reflexes: DTRs 2+ throughout. Results for orders placed or performed during the hospital encounter of 12/28/16   POC CREATININE   Result Value Ref Range    Creatinine (POC) 1.0 0.6 - 1.3 MG/DL    GFR-AA (POC) >60 >60 ml/min/1.73m2    GFR, non-AA (POC) >60 >60 ml/min/1.73m2       Orders Placed This Encounter    DISCONTD: memantine-donepezil (NAMZARIC) 28-10 mg CSpX     Sig: Take 1 Cap by mouth daily. Dispense:  30 Cap     Refill:  0    memantine-donepezil (NAMZARIC) 28-10 mg CSpX     Sig: Take 1 Cap by mouth daily. Dispense:  30 Cap     Refill:  5       No diagnosis found. Follow-up Disposition:  Return in about 2 months (around 6/7/2017). Talked about dementia and progression. Start Namenda XR in form of Namzarric up to 28-10. They understand this and have no questions about POC or side effects. Continue to stay active and busy. Pick back up sudoku or puzzles. Continue to monitor. Gave memory loss information to them. Call with any questions. Andres Rodríguez NP        This note will not be viewable in Begun      Total time: 25 min   Counseling / coordination time: 25min   > 50% counseling / coordination?: Yes re: medications, progression, treatments.

## 2017-04-07 NOTE — MR AVS SNAPSHOT
Visit Information Date & Time Provider Department Dept. Phone Encounter #  
 4/7/2017 10:30 AM Ernestina Oh NP [facility] [phone] [id number] Follow-up Instructions Return in about 2 months (around 6/7/2017). Upcoming Health Maintenance Date Due Hepatitis C Screening 1950 DTaP/Tdap/Td series (1 - Tdap) 4/1/1971 ZOSTER VACCINE AGE 60> 4/1/2010 HEMOGLOBIN A1C Q6M 5/24/2016 COLONOSCOPY 6/21/2016 MEDICARE YEARLY EXAM 11/24/2016 FOOT EXAM Q1 11/24/2016 MICROALBUMIN Q1 11/24/2016 LIPID PANEL Q1 11/24/2016 Pneumococcal 65+ High/Highest Risk (2 of 2 - PPSV23) 12/20/2016 EYE EXAM RETINAL OR DILATED Q1 4/7/2017 GLAUCOMA SCREENING Q2Y 4/7/2018 Allergies as of 4/7/2017  Review Complete On: 4/7/2017 By: Ana Perez LPN No Known Allergies Current Immunizations  Reviewed on 11/24/2015 Name Date Influenza Vaccine 9/22/2016, 10/5/2014 Influenza Vaccine Split 11/26/2012, 12/20/2011 Influenza Vaccine Whole 12/20/2011 Pneumococcal Conjugate (PCV-13) 11/24/2015 Pneumococcal Vaccine (Unspecified Type) 12/20/2011, 12/20/2011 Not reviewed this visit Vitals BP Resp Height(growth percentile) Weight(growth percentile) BMI Smoking Status 130/80 20 6' 1\" (1.854 m) 185 lb (83.9 kg) 24.41 kg/m2 Current Every Day Smoker Vitals History BMI and BSA Data Body Mass Index Body Surface Area  
 24.41 kg/m 2 2.08 m 2 Preferred Pharmacy Pharmacy Name Phone [facility], [address] [place] [address] [phone] Your Updated Medication List  
  
   
This list is accurate as of: 4/7/17 11:04 AM.  Always use your most recent med list.  
  
  
  
  
 aspirin 325 mg tablet Commonly known as:  ASPIRIN Take 325 mg by mouth daily. FISH OIL PO Take 1,200 mg by mouth daily. M-VIT PO Take  by mouth. memantine-donepezil 28-10 mg Cspx Commonly known as:  MOTION [facility] Take 1 Cap by mouth daily. metoprolol succinate 50 mg XL tablet Commonly known as:  TOPROL XL Take 1 Tab by mouth daily. niacin  mg tablet Commonly known as:  Niaspan Take 1 Tab by mouth nightly. OMEGA 3 PO Take  by mouth. POTASSIUM PO Take 550 mg by mouth. rosuvastatin 20 mg tablet Commonly known as:  CRESTOR Take 1 Tab by mouth daily. Prescriptions Sent to Pharmacy Refills  
 memantine-donepezil (NAMZARIC) 28-10 mg CSpX 5 Sig: Take 1 Cap by mouth daily. Class: Normal  
 Pharmacy: [facility] [address] [place] [address] #: [phone] Route: Oral  
  
Follow-up Instructions Return in about 2 months (around 6/7/2017). Patient Instructions PRESCRIPTION REFILL POLICY [facility] Statement to Patients April 1, 2014 In an effort to ensure the large volume of patient prescription refills is processed in the most efficient and expeditious manner, we are asking our patients to assist us by calling your Pharmacy for all prescription refills, this will include also your  Mail Order Pharmacy. The pharmacy will contact our office electronically to continue the refill process. Please do not wait until the last minute to call your pharmacy. We need at least 48 hours (2days) to fill prescriptions. We also encourage you to call your pharmacy before going to  your prescription to make sure it is ready. With regard to controlled substance prescription refill requests (narcotic refills) that need to be picked up at our office, we ask your cooperation by providing us with at least 72 hours (3days) notice that you will need a refill.  
 
We will not refill narcotic prescription refill requests after 4:00pm on any weekday, Monday through Thursday, or after 2:00pm on Fridays, or on the weekends. We encourage everyone to explore another way of getting your prescription refill request processed using [employer]KT, our patient web portal through our electronic medical record system. [employer] is an efficient and effective way to communicate your medication request directly to the office and  downloadable as an kvein on your smart phone . NMRKT also features a review functionality that allows you to view your medication list as well as leave messages for your physician. Are you ready to get connected? If so please review the attatched instructions or speak to any of our staff to get you set up right away! Thank you so much for your cooperation. Should you have any questions please contact our Practice Administrator. The Physicians and Staff,  [facility] Lidiamaria esther [address] What is a living will? A living will is a legal form you use to write down the kind of care you want at the end of your life. It is used by the health professionals who will treat you if you aren't able to decide for yourself. If you put your wishes in writing, your loved ones and others will know what kind of care you want. They won't need to guess. This can ease your mind and be helpful to others. A living will is not the same as an estate or property will. An estate will explains what you want to happen with your money and property after you die. Is a living will a legal document? A living will is a legal document. Each state has its own laws about living luther. If you move to another state, make sure that your living will is legal in the state where you now live. Or you might use a universal form that has been approved by many states. This kind of form can sometimes be completed and stored online. Your electronic copy will then be available wherever you have a connection to the Internet. In most cases, doctors will respect your wishes even if you have a form from a different state. · You don't need an  to complete a living will. But legal advice can be helpful if your state's laws are unclear, your health history is complicated, or your family can't agree on what should be in your living will. · You can change your living will at any time. Some people find that their wishes about end-of-life care change as their health changes. · In addition to making a living will, think about completing a medical power of  form. This form lets you name the person you want to make end-of-life treatment decisions for you (your \"health care agent\") if you're not able to. Many hospitals and nursing homes will give you the forms you need to complete a living will and a medical power of . · Your living will is used only if you can't make or communicate decisions for yourself anymore. If you become able to make decisions again, you can accept or refuse any treatment, no matter what you wrote in your living will. · Your state may offer an online registry. This is a place where you can store your living will online so the doctors and nurses who need to treat you can find it right away. What should you think about when creating a living will? Talk about your end-of-life wishes with your family members and your doctor. Let them know what you want. That way the people making decisions for you won't be surprised by your choices. Think about these questions as you make your living will: · Do you know enough about life support methods that might be used? If not, talk to your doctor so you know what might be done if you can't breathe on your own, your heart stops, or you're unable to swallow. · What things would you still want to be able to do after you receive life-support methods? Would you want to be able to walk? To speak? To eat on your own? To live without the help of machines? · If you have a choice, where do you want to be cared for? In your home? At a hospital or nursing home? · Do you want certain Mu-ism practices performed if you become very ill? · If you have a choice at the end of your life, where would you prefer to die? At home? In a hospital or nursing home? Somewhere else? · Would you prefer to be buried or cremated? · Do you want your organs to be donated after you die? What should you do with your living will? · Make sure that your family members and your health care agent have copies of your living will. · Give your doctor a copy of your living will to keep in your medical record. If you have more than one doctor, make sure that each one has a copy. · You may want to put a copy of your living will where it can be easily found. Where can you learn more? Go to http://olu-vick.info/. Enter D437 in the search box to learn more about \"Learning About Living Perrowilian. \" Current as of: February 24, 2016 Content Version: 11.2 © 8640-0809 Scalado. Care instructions adapted under license by [employer] (which disclaims liability or warranty for this information). If you have questions about a medical condition or this instruction, always ask your healthcare professional. [place] [address] any warranty or liability for your use of this information. Advance Directives: Care Instructions Your Care Instructions An advance directive is a legal way to state your wishes at the end of your life. It tells your family and your doctor what to do if you can no longer say what you want. There are two main types of advance directives. You can change them any time that your wishes change. · A living will tells your family and your doctor your wishes about life support and other treatment. · A durable power of  for health care lets you name a person to make treatment decisions for you when you can't speak for yourself. This person is called a health care agent. If you do not have an advance directive, decisions about your medical care may be made by a doctor or a  who doesn't know you. It may help to think of an advance directive as a gift to the people who care for you. If you have one, they won't have to make tough decisions by themselves. Follow-up care is a key part of your treatment and safety. Be sure to make and go to all appointments, and call your doctor if you are having problems. It's also a good idea to know your test results and keep a list of the medicines you take. How can you care for yourself at home? · Discuss your wishes with your loved ones and your doctor. This way, there are no surprises. · Many states have a unique form. Or you might use a universal form that has been approved by many states. This kind of form can sometimes be completed and stored online. Your electronic copy will then be available wherever you have a connection to the Internet. In most cases, doctors will respect your wishes even if you have a form from a different state. · You don't need a  to do an advance directive. But you may want to get legal advice. · Think about these questions when you prepare an advance directive: ¨ Who do you want to make decisions about your medical care if you are not able to? Many people choose a family member or close friend. ¨ Do you know enough about life support methods that might be used? If not, talk to your doctor so you understand. ¨ What are you most afraid of that might happen? You might be afraid of having pain, losing your independence, or being kept alive by machines. ¨ Where would you prefer to die? Choices include your home, a hospital, or a nursing home. ¨ Would you like to have information about hospice care to support you and your family? ¨ Do you want to donate organs when you die? ¨ Do you want certain Anabaptism practices performed before you die? If so, put your wishes in the advance directive. · Read your advance directive every year, and make changes as needed. When should you call for help? Be sure to contact your doctor if you have any questions. Where can you learn more? Go to http://olu-vick.info/. Enter R264 in the search box to learn more about \"Advance Directives: Care Instructions. \" Current as of: November 17, 2016 Content Version: 11.2 © 6410-3792 Foundshopping.com. Care instructions adapted under license by [employer] (which disclaims liability or warranty for this information). If you have questions about a medical condition or this instruction, always ask your healthcare professional. [address] any warranty or liability for your use of this information. Introducing [facility]! Dilma Clemons introduces Witget patient portal. Now you can access parts of your medical record, email your doctor's office, and request medication refills online. 1. In your internet browser, go to [email or web address] 2. Click on the First Time User? Click Here link in the Sign In box. You will see the New Member Sign Up page. 3. Enter your Connect [employer] Software Solution[employer] Access Code exactly as it appears below. You will not need to use this code after youve completed the sign-up process. If you do not sign up before the expiration date, you must request a new code. · [employer] Access Code: [facility] Expires: 7/6/2017 10:24 AM 
 
4. Enter the last four digits of your Social Security Number (xxxx) and Date of Birth (mm/dd/yyyy) as indicated and click Submit. You will be taken to the next sign-up page. 5. Create a [employer] ID. This will be your Witget login ID and cannot be changed, so think of one that is secure and easy to remember. 6. Create a Witget password. You can change your password at any time. 7. Enter your Password Reset Question and Answer. This can be used at a later time if you forget your password. 8. Enter your e-mail address. You will receive e-mail notification when new information is available in [address] 9. Click Sign Up. You can now view and download portions of your medical record. 10. Click the Download Summary menu link to download a portable copy of your medical information. If you have questions, please visit the Frequently Asked Questions section of the [employer] website. Remember, [employer] is NOT to be used for urgent needs. For medical emergencies, dial 911. Now available from your iPhone and Android! Please provide this summary of care documentation to your next provider. Your primary care clinician is listed as TATIANA BRAUN. If you have any questions after today's visit, please call [phone].

## 2017-06-13 ENCOUNTER — OFFICE VISIT (OUTPATIENT)
Dept: NEUROLOGY | Age: 67
End: 2017-06-13

## 2017-06-13 VITALS
HEART RATE: 56 BPM | OXYGEN SATURATION: 99 % | RESPIRATION RATE: 15 BRPM | BODY MASS INDEX: 23.86 KG/M2 | TEMPERATURE: 98.1 F | DIASTOLIC BLOOD PRESSURE: 76 MMHG | WEIGHT: 180 LBS | SYSTOLIC BLOOD PRESSURE: 122 MMHG | HEIGHT: 73 IN

## 2017-06-13 DIAGNOSIS — F03.B0 MODERATE DEMENTIA, WITHOUT BEHAVIORAL DISTURBANCE: Primary | ICD-10-CM

## 2017-06-13 NOTE — PATIENT INSTRUCTIONS
Information Regarding Testing     If you have physican order for a test or a medication denied by your insurance company, this does not mean the test or medication is not appropriate for you as that is a medical decision, not a decision to be made by an insurance company representative or by an [facility] physician who has not interviewed and examined you. This is a decision to be made between you and your physician. The denial of services is a contractual matter between you and your insurance company, not an issue between your physician and the insurance company. If your test or medication is denied, you can take the following steps to help resolve the issue:    1. File a complaint with the [facility] of [employer] regarding your insurance company's denial of services ordered for you. You can do this either by calling them directly or by completing an on-line complaint form on the [employer] [facility]. This can be found at [email or web address]    2. Also file a formal complaint with your insurance company and ask to have the name of the person denying the service so that you may explore a legal option should you be harmed by this denial of service. Again, the fact the insurance company will not pay for the service does not mean it is not medically necessary and I would encourage you to follow through with the plan that was made with your physician  3. File a written complaint with your employer so your employer and benefit manager is aware of the poor coverage they are providing their employees. If you have medicare/medicaid, complain to your representative in the House and to your Samara Becker.           10 [facility]   Statement to Patients  April 1, 2014      In an effort to ensure the large volume of patient prescription refills is processed in the most efficient and expeditious manner, we are asking our patients to assist us by calling your Pharmacy for all prescription refills, this will include also your  Mail Order Pharmacy. The pharmacy will contact our office electronically to continue the refill process. Please do not wait until the last minute to call your pharmacy. We need at least 48 hours (2days) to fill prescriptions. We also encourage you to call your pharmacy before going to  your prescription to make sure it is ready. With regard to controlled substance prescription refill requests (narcotic refills) that need to be picked up at our office, we ask your cooperation by providing us with at least 72 hours (3days) notice that you will need a refill. We will not refill narcotic prescription refill requests after 4:00pm on any weekday, Monday through Thursday, or after 2:00pm on Fridays, or on the weekends. We encourage everyone to explore another way of getting your prescription refill request processed using Fenergo, our patient web portal through our electronic medical record system. Fenergo is an efficient and effective way to communicate your medication request directly to the office and  downloadable as an kevin on your smart phone . Fenergo also features a review functionality that allows you to view your medication list as well as leave messages for your physician. Are you ready to get connected? If so please review the attatched instructions or speak to any of our staff to get you set up right away! Thank you so much for your cooperation. Should you have any questions please contact our Practice Administrator. The Physicians and Staff,  [facility]       If we have ordered testing for you, we do not call patients with results and we do not give test results over the phone. We schedule follow up appointments so that your results can be discussed in person and any questions you have regarding them may be addressed.   If something of concern is revealed on your test, we will call you for a sooner follow up appointment. Additionally, results may be found by using the My Chart feature and one of our patient service representatives at the  can give you instructions on how to access this feature of our electronic medical record system. Learning About Living Samuel  What is a living will? A living will is a legal form you use to write down the kind of care you want at the end of your life. It is used by the health professionals who will treat you if you aren't able to decide for yourself. If you put your wishes in writing, your loved ones and others will know what kind of care you want. They won't need to guess. This can ease your mind and be helpful to others. A living will is not the same as an estate or property will. An estate will explains what you want to happen with your money and property after you die. Is a living will a legal document? A living will is a legal document. Each state has its own laws about living luther. If you move to another state, make sure that your living will is legal in the state where you now live. Or you might use a universal form that has been approved by many states. This kind of form can sometimes be completed and stored online. Your electronic copy will then be available wherever you have a connection to the Internet. In most cases, doctors will respect your wishes even if you have a form from a different state. · You don't need an  to complete a living will. But legal advice can be helpful if your state's laws are unclear, your health history is complicated, or your family can't agree on what should be in your living will. · You can change your living will at any time. Some people find that their wishes about end-of-life care change as their health changes. · In addition to making a living will, think about completing a medical power of  form.  This form lets you name the person you want to make end-of-life treatment decisions for you (your \"health care agent\") if you're not able to. Many hospitals and nursing homes will give you the forms you need to complete a living will and a medical power of . · Your living will is used only if you can't make or communicate decisions for yourself anymore. If you become able to make decisions again, you can accept or refuse any treatment, no matter what you wrote in your living will. · Your state may offer an online registry. This is a place where you can store your living will online so the doctors and nurses who need to treat you can find it right away. What should you think about when creating a living will? Talk about your end-of-life wishes with your family members and your doctor. Let them know what you want. That way the people making decisions for you won't be surprised by your choices. Think about these questions as you make your living will:  · Do you know enough about life support methods that might be used? If not, talk to your doctor so you know what might be done if you can't breathe on your own, your heart stops, or you're unable to swallow. · What things would you still want to be able to do after you receive life-support methods? Would you want to be able to walk? To speak? To eat on your own? To live without the help of machines? · If you have a choice, where do you want to be cared for? In your home? At a hospital or nursing home? · Do you want certain Christian practices performed if you become very ill? · If you have a choice at the end of your life, where would you prefer to die? At home? In a hospital or nursing home? Somewhere else? · Would you prefer to be buried or cremated? · Do you want your organs to be donated after you die? What should you do with your living will? · Make sure that your family members and your health care agent have copies of your living will. · Give your doctor a copy of your living will to keep in your medical record.  If you have more than one doctor, make sure that each one has a copy. · You may want to put a copy of your living will where it can be easily found. Where can you learn more? Go to http://olu-vick.info/. Enter A555 in the search box to learn more about \"Learning About Living Perroy. \"  Current as of: February 24, 2016  Content Version: 11.2  © 8347-0647 [employer]. Care instructions adapted under license by [employer] (which disclaims liability or warranty for this information). If you have questions about a medical condition or this instruction, always ask your healthcare professional. [address] any warranty or liability for your use of this information.

## 2017-06-13 NOTE — MR AVS SNAPSHOT
Visit Information Date & Time Provider Department Dept. Phone Encounter #  
 6/13/2017  9:00 AM Ale Kim NP [facility] [phone] [id number] Follow-up Instructions Return in about 3 months (around 9/13/2017). Upcoming Health Maintenance Date Due Hepatitis C Screening 1950 DTaP/Tdap/Td series (1 - Tdap) 4/1/1971 ZOSTER VACCINE AGE 60> 4/1/2010 HEMOGLOBIN A1C Q6M 5/24/2016 COLONOSCOPY 6/21/2016 MEDICARE YEARLY EXAM 11/24/2016 FOOT EXAM Q1 11/24/2016 MICROALBUMIN Q1 11/24/2016 LIPID PANEL Q1 11/24/2016 Pneumococcal 65+ High/Highest Risk (2 of 2 - PPSV23) 12/20/2016 EYE EXAM RETINAL OR DILATED Q1 4/7/2017 INFLUENZA AGE 9 TO ADULT 8/1/2017 GLAUCOMA SCREENING Q2Y 4/7/2018 Allergies as of 6/13/2017  Review Complete On: 6/13/2017 By: Ale Kim NP No Known Allergies Current Immunizations  Reviewed on 11/24/2015 Name Date Influenza Vaccine 9/22/2016, 10/5/2014 Influenza Vaccine Split 11/26/2012, 12/20/2011 Influenza Vaccine Whole 12/20/2011 Pneumococcal Conjugate (PCV-13) 11/24/2015 Pneumococcal Vaccine (Unspecified Type) 12/20/2011, 12/20/2011 Not reviewed this visit Vitals BP Pulse Temp Resp Height(growth percentile) Weight(growth percentile) 122/76 (!) 56 98.1 °F (36.7 °C) 15 6' 1\" (1.854 m) 180 lb (81.6 kg) SpO2 BMI Smoking Status 99% 23.75 kg/m2 Current Every Day Smoker Vitals History BMI and BSA Data Body Mass Index Body Surface Area  
 23.75 kg/m 2 2.05 m 2 Preferred Pharmacy Pharmacy Name Phone [facility] [address], [facility] [phone] Your Updated Medication List  
  
   
This list is accurate as of: 6/13/17  9:39 AM.  Always use your most recent med list.  
  
  
  
  
 aspirin 325 mg tablet Commonly known as:  ASPIRIN  
 Take 325 mg by mouth daily. FISH OIL PO Take 1,200 mg by mouth daily. M-VIT PO Take  by mouth.  
  
 memantine-donepezil 28-10 mg Cspx Commonly known as:  MOTION [facility] Take 1 Cap by mouth daily. metoprolol succinate 50 mg XL tablet Commonly known as:  TOPROL XL Take 1 Tab by mouth daily. niacin  mg tablet Commonly known as:  Niaspan Take 1 Tab by mouth nightly. OMEGA 3 PO Take  by mouth. POTASSIUM PO Take 550 mg by mouth. rosuvastatin 20 mg tablet Commonly known as:  CRESTOR Take 1 Tab by mouth daily. Follow-up Instructions Return in about 3 months (around 9/13/2017). Patient Instructions Information Regarding Testing If you have physican order for a test or a medication denied by your insurance company, this does not mean the test or medication is not appropriate for you as that is a medical decision, not a decision to be made by an insurance company representative or by an [facility] physician who has not interviewed and examined you. This is a decision to be made between you and your physician. The denial of services is a contractual matter between you and your insurance company, not an issue between your physician and the insurance company. If your test or medication is denied, you can take the following steps to help resolve the issue: 1. File a complaint with the [facility] of [employer] regarding your insurance company's denial of services ordered for you. You can do this either by calling them directly or by completing an on-line complaint form on the [employer]. This can be found at [email or web address] Volo 2. Also file a formal complaint with your insurance company and ask to have the name of the person denying the service so that you may explore a legal option should you be harmed by this denial of service.   Again, the fact the insurance company will not pay for the service does not mean it is not medically necessary and I would encourage you to follow through with the plan that was made with your physician 3. File a written complaint with your employer so your employer and benefit manager is aware of the poor coverage they are providing their employees. If you have medicare/medicaid, complain to your representative in the House and to your Samara Becker. PRESCRIPTION REFILL POLICY [facility] Statement to Patients April 1, 2014 In an effort to ensure the large volume of patient prescription refills is processed in the most efficient and expeditious manner, we are asking our patients to assist us by calling your Pharmacy for all prescription refills, this will include also your  Mail Order Pharmacy. The pharmacy will contact our office electronically to continue the refill process. Please do not wait until the last minute to call your pharmacy. We need at least 48 hours (2days) to fill prescriptions. We also encourage you to call your pharmacy before going to  your prescription to make sure it is ready. With regard to controlled substance prescription refill requests (narcotic refills) that need to be picked up at our office, we ask your cooperation by providing us with at least 72 hours (3days) notice that you will need a refill. We will not refill narcotic prescription refill requests after 4:00pm on any weekday, Monday through Thursday, or after 2:00pm on Fridays, or on the weekends. We encourage everyone to explore another way of getting your prescription refill request processed using ScreachTV, our patient web portal through our electronic medical record system. ScreachTV is an efficient and effective way to communicate your medication request directly to the office and  downloadable as an kevin on your smart phone .  ScreachTV also features a review functionality that allows you to view your medication list as well as leave messages for your physician. Are you ready to get connected? If so please review the attatched instructions or speak to any of our staff to get you set up right away! Thank you so much for your cooperation. Should you have any questions please contact our Practice Administrator. The Physicians and Staff,  [facility] If we have ordered testing for you, we do not call patients with results and we do not give test results over the phone. We schedule follow up appointments so that your results can be discussed in person and any questions you have regarding them may be addressed. If something of concern is revealed on your test, we will call you for a sooner follow up appointment. Additionally, results may be found by using the My Chart feature and one of our patient service representatives at the  can give you instructions on how to access this feature of our electronic medical record system. Lidia Champagne 1721 What is a living will? A living will is a legal form you use to write down the kind of care you want at the end of your life. It is used by the health professionals who will treat you if you aren't able to decide for yourself. If you put your wishes in writing, your loved ones and others will know what kind of care you want. They won't need to guess. This can ease your mind and be helpful to others. A living will is not the same as an estate or property will. An estate will explains what you want to happen with your money and property after you die. Is a living will a legal document? A living will is a legal document. Each state has its own laws about living luther. If you move to another state, make sure that your living will is legal in the state where you now live. Or you might use a universal form that has been approved by many states.  This kind of form can sometimes be completed and stored online. Your electronic copy will then be available wherever you have a connection to the Internet. In most cases, doctors will respect your wishes even if you have a form from a different state. · You don't need an  to complete a living will. But legal advice can be helpful if your state's laws are unclear, your health history is complicated, or your family can't agree on what should be in your living will. · You can change your living will at any time. Some people find that their wishes about end-of-life care change as their health changes. · In addition to making a living will, think about completing a medical power of  form. This form lets you name the person you want to make end-of-life treatment decisions for you (your \"health care agent\") if you're not able to. Many hospitals and nursing homes will give you the forms you need to complete a living will and a medical power of . · Your living will is used only if you can't make or communicate decisions for yourself anymore. If you become able to make decisions again, you can accept or refuse any treatment, no matter what you wrote in your living will. · Your state may offer an online registry. This is a place where you can store your living will online so the doctors and nurses who need to treat you can find it right away. What should you think about when creating a living will? Talk about your end-of-life wishes with your family members and your doctor. Let them know what you want. That way the people making decisions for you won't be surprised by your choices. Think about these questions as you make your living will: · Do you know enough about life support methods that might be used? If not, talk to your doctor so you know what might be done if you can't breathe on your own, your heart stops, or you're unable to swallow.  
· What things would you still want to be able to do after you receive life-support methods? Would you want to be able to walk? To speak? To eat on your own? To live without the help of machines? · If you have a choice, where do you want to be cared for? In your home? At a hospital or nursing home? · Do you want certain Gnosticist practices performed if you become very ill? · If you have a choice at the end of your life, where would you prefer to die? At home? In a hospital or nursing home? Somewhere else? · Would you prefer to be buried or cremated? · Do you want your organs to be donated after you die? What should you do with your living will? · Make sure that your family members and your health care agent have copies of your living will. · Give your doctor a copy of your living will to keep in your medical record. If you have more than one doctor, make sure that each one has a copy. · You may want to put a copy of your living will where it can be easily found. Where can you learn more? Go to http://olu-vick.info/. Enter S067 in the search box to learn more about \"Learning About Living Mozella Babinski. \" Current as of: February 24, 2016 Content Version: 11.2 © 7628-9517 [employer]. Care instructions adapted under license by [employer] (which disclaims liability or warranty for this information). If you have questions about a medical condition or this instruction, always ask your healthcare professional. [place] [address] any warranty or liability for your use of this information. Introducing [facility]! [employer] introduces Maven patient portal. Now you can access parts of your medical record, email your doctor's office, and request medication refills online. 1. In your internet browser, go to [email or web address] 2. Click on the First Time User? Click Here link in the Sign In box. You will see the New Member Sign Up page. 3. Enter your [employer] Access Code exactly as it appears below. You will not need to use this code after youve completed the sign-up process. If you do not sign up before the expiration date, you must request a new code. · [employer] Access Code: [facility] Expires: 7/6/2017 10:24 AM 
 
4. Enter the last four digits of your Social Security Number (xxxx) and Date of Birth (mm/dd/yyyy) as indicated and click Submit. You will be taken to the next sign-up page. 5. Create a Fliplingot ID. This will be your [employer]s login ID and cannot be changed, so think of one that is secure and easy to remember. 6. Create a [employer] password. You can change your password at any time. 7. Enter your Password Reset Question and Answer. This can be used at a later time if you forget your password. 8. Enter your e-mail address. You will receive e-mail notification when new information is available in [address] 9. Click Sign Up. You can now view and download portions of your medical record. 10. Click the Download Summary menu link to download a portable copy of your medical information. If you have questions, please visit the Frequently Asked Questions section of the [employer] website. Remember, [employer] is NOT to be used for urgent needs. For medical emergencies, dial 911. Now available from your iPhone and Android! Please provide this summary of care documentation to your next provider. Your primary care clinician is listed as TATIANA BRAUN. If you have any questions after today's visit, please call [phone].

## 2017-06-13 NOTE — PROGRESS NOTES
Follow up for memory loss. No significant changes in memory since last visit. No acute problems reported.

## 2017-06-13 NOTE — PROGRESS NOTES
Kenny Saravia is a 79 y.o. male who presents with the following  Chief Complaint   Patient presents with    Memory Loss     follow up       HPI Patient comes in with wife for a follow up for memory loss. Currently on Namzaric 28-10 and doing well with this. Feels his memory is about the same. Not better or worse. Good days and bad days. Wife agrees. He mostly has trouble with short term memory remembering where he puts things. Remembering where in the house things are. Wife can help when he gets to the point where he is getting frustrated. She feels like things have stabilized. Doing more out in the yard such as mowing grass, yard work, also likes to watch tv. Trying to stay mentally and physically active. Has been taking naps during day due to being more active. Sleeping well over night. Appetite is doing well also. No trouble with driving. Using a wipe board to help orient to date and times and such. Remembering medications. Agitated at times at little things but can get back to his normal without issues. Doing a lot of things with [place] and going to [place] soon. No Known Allergies    Current Outpatient Prescriptions   Medication Sig    memantine-donepezil (NAMZARIC) 28-10 mg CSpX Take 1 Cap by mouth daily.  DOCOSAHEXANOIC ACID/EPA (FISH OIL PO) Take 1,200 mg by mouth daily.  PV W-O LAZARUS/FERROUS FUMARATE/FA (M-VIT PO) Take  by mouth.  POTASSIUM PO Take 550 mg by mouth.  rosuvastatin (CRESTOR) 20 mg tablet Take 1 Tab by mouth daily.  metoprolol succinate (TOPROL XL) 50 mg XL tablet Take 1 Tab by mouth daily.  niacin ER (NIASPAN) 500 mg tablet Take 1 Tab by mouth nightly.  OMEGA-3 FATTY ACIDS (OMEGA 3 PO) Take  by mouth.  aspirin (ASPIRIN) 325 mg tablet Take 325 mg by mouth daily. No current facility-administered medications for this visit.         History   Smoking Status    Current Every Day Smoker    Packs/day: 1.00   Smokeless Tobacco    Never Used       Past Medical History:   Diagnosis Date    Alcoholism [facility])     CAD (coronary artery disease) 6/15/2010    DM (diabetes mellitus) ([address] 6/15/2010    Falls     Heart disease     HTN (hypertension) 6/15/2010    Hypercholesteremia 6/15/2010    PTSD (post-traumatic stress disorder)     Skin cancer 6/15/2010    Smoker 6/15/2010    Snoring        Past Surgical History:   Procedure Laterality Date    CARDIAC SURG PROCEDURE UNLIST      stent       Family History   Problem Relation Age of Onset    Cancer Mother     Heart Disease Other        Social History     Social History    Marital status:      Spouse name: N/A    Number of children: N/A    Years of education: N/A     Social History Main Topics    Smoking status: Current Every Day Smoker     Packs/day: 1.00    Smokeless tobacco: Never Used    Alcohol use 2.4 oz/week     4 Cans of beer per week    Drug use: No    Sexual activity: No     Other Topics Concern    None     Social History Narrative       Review of Systems   HENT: Negative for hearing loss and tinnitus. Eyes: Negative for blurred vision, double vision and photophobia. Respiratory: Negative for shortness of breath and wheezing. Cardiovascular: Negative for chest pain and palpitations. Gastrointestinal: Negative for nausea and vomiting. Musculoskeletal: Negative for falls. Neurological: Negative for dizziness, tingling, tremors, weakness and headaches. Psychiatric/Behavioral: Positive for memory loss. Negative for hallucinations. The patient is not nervous/anxious and does not have insomnia. Remainder of comprehensive review is negative.      Physical Exam :    Visit Vitals    /76    Pulse (!) 56    Temp 98.1 °F (36.7 °C)    Resp 15    Ht 6' 1\" (1.854 m)    Wt 81.6 kg (180 lb)    SpO2 99%    BMI 23.75 kg/m2       General: Well defined, nourished, and groomed individual in no acute distress.    Neck: Supple, nontender, no bruits, no pain with resistance to active range of motion.    Heart: Regular rate and rhythm, no murmurs, rub, or gallop. Normal S1S2. Lungs: Clear to auscultation bilaterally with equal chest expansion, no cough, no wheeze  Musculoskeletal: Extremities revealed no edema and had full range of motion of joints.    Psych: Good mood and bright affect    NEUROLOGICAL EXAMINATION:    Mental Status: Alert and oriented to person, place, and not time. MMSE 26    Cranial Nerves:    II, III, IV, VI: Visual acuity grossly intact. Visual fields are normal.    Pupils are equal, round, and reactive to light and accommodation.    Extra-ocular movements are full and fluid. Fundoscopic exam was benign, no ptosis or nystagmus.    V-XII: Hearing is grossly intact. Facial features are symmetric, with normal sensation and strength. The palate rises symmetrically and the tongue protrudes midline. Sternocleidomastoids 5/5. Motor Examination: Normal tone, bulk, and strength, 5/5 muscle strength throughout. Coordination: Finger to nose was normal. No resting or intention tremor    Gait and Station: Steady while walking. Normal arm swing. No pronator drift. No muscle wasting or fasiculations noted. Reflexes: DTRs 2+ throughout. Results for orders placed or performed during the hospital encounter of 12/28/16   POC CREATININE   Result Value Ref Range    Creatinine (POC) 1.0 0.6 - 1.3 MG/DL    GFR-AA (POC) >60 >60 ml/min/1.73m2    GFR, non-AA (POC) >60 >60 ml/min/1.73m2       No orders of the defined types were placed in this encounter. No diagnosis found. Follow-up Disposition:  Return in about 3 months (around 9/13/2017). Continue Namzaric 28-10 for further memory preservation as he is doing well with this. Continue to stay active physically and mentally. Wife is monitoring him and will call with any changes. Can use risperdal PRN if needed for agitation. Doing well.          This note will not be viewable in [employer]

## 2017-08-01 ENCOUNTER — TELEPHONE (OUTPATIENT)
Dept: FAMILY MEDICINE CLINIC | Age: 67
End: 2017-08-01

## 2017-08-18 ENCOUNTER — OFFICE VISIT (OUTPATIENT)
Dept: FAMILY MEDICINE CLINIC | Age: 67
End: 2017-08-18

## 2017-08-18 ENCOUNTER — HOSPITAL ENCOUNTER (OUTPATIENT)
Dept: LAB | Age: 67
Discharge: HOME | End: 2017-08-18
Payer: MEDICARE

## 2017-08-18 VITALS
BODY MASS INDEX: 23.39 KG/M2 | OXYGEN SATURATION: 98 % | DIASTOLIC BLOOD PRESSURE: 62 MMHG | HEART RATE: 57 BPM | SYSTOLIC BLOOD PRESSURE: 104 MMHG | TEMPERATURE: 97.9 F | HEIGHT: 73 IN | RESPIRATION RATE: 16 BRPM | WEIGHT: 176.5 LBS

## 2017-08-18 DIAGNOSIS — E11.9 TYPE 2 DIABETES MELLITUS WITHOUT COMPLICATION, WITHOUT LONG-TERM CURRENT USE OF INSULIN (HCC): ICD-10-CM

## 2017-08-18 DIAGNOSIS — Z00.00 WELL ADULT EXAM: Primary | ICD-10-CM

## 2017-08-18 DIAGNOSIS — I10 ESSENTIAL HYPERTENSION: ICD-10-CM

## 2017-08-18 DIAGNOSIS — E78.00 HYPERCHOLESTEREMIA: ICD-10-CM

## 2017-08-18 DIAGNOSIS — Z12.5 SCREENING PSA (PROSTATE SPECIFIC ANTIGEN): ICD-10-CM

## 2017-08-18 DIAGNOSIS — Z71.89 ADVANCED CARE PLANNING/COUNSELING DISCUSSION: ICD-10-CM

## 2017-08-18 PROCEDURE — 83036 HEMOGLOBIN GLYCOSYLATED A1C: CPT

## 2017-08-18 PROCEDURE — 84153 ASSAY OF PSA TOTAL: CPT

## 2017-08-18 PROCEDURE — 84443 ASSAY THYROID STIM HORMONE: CPT

## 2017-08-18 PROCEDURE — 82043 UR ALBUMIN QUANTITATIVE: CPT

## 2017-08-18 PROCEDURE — 85025 COMPLETE CBC W/AUTO DIFF WBC: CPT

## 2017-08-18 PROCEDURE — 80053 COMPREHEN METABOLIC PANEL: CPT

## 2017-08-18 PROCEDURE — 80061 LIPID PANEL: CPT

## 2017-08-18 RX ORDER — DONEPEZIL HYDROCHLORIDE 10 MG/1
TABLET, FILM COATED ORAL
Refills: 5 | COMMUNITY
Start: 2017-07-31 | End: 2017-11-06

## 2017-08-18 NOTE — LETTER
8/21/2017 8:50 AM 
 
Mr. Skye Bernardo [phone] [id number] Dear Jamaal Zee.: 
 
Please find your most recent results below. Resulted Orders CBC WITH AUTOMATED DIFF Result Value Ref Range WBC 7.3 3.4 - 10.8 x10E3/uL  
 RBC 4.94 4.14 - 5.80 x10E6/uL HGB 16.2 12.6 - 17.7 g/dL HCT 47.4 37.5 - 51.0 % MCV 96 79 - 97 fL  
 MCH 32.8 26.6 - 33.0 pg  
 MCHC 34.2 31.5 - 35.7 g/dL  
 RDW 13.6 12.3 - 15.4 % PLATELET 132 004 - 073 x10E3/uL NEUTROPHILS 51 % Lymphocytes 38 % MONOCYTES 7 % EOSINOPHILS 3 % BASOPHILS 1 %  
 ABS. NEUTROPHILS 3.8 1.4 - 7.0 x10E3/uL Abs Lymphocytes 2.8 0.7 - 3.1 x10E3/uL  
 ABS. MONOCYTES 0.5 0.1 - 0.9 x10E3/uL  
 ABS. EOSINOPHILS 0.2 0.0 - 0.4 x10E3/uL  
 ABS. BASOPHILS 0.0 0.0 - 0.2 x10E3/uL IMMATURE GRANULOCYTES 0 %  
 ABS. IMM. GRANS. 0.0 0.0 - 0.1 x10E3/uL Narrative Performed at:  [address]  [id number] : Patience Faustin MD, Phone:  [phone] METABOLIC PANEL, COMPREHENSIVE Result Value Ref Range Glucose 126 (H) 65 - 99 mg/dL BUN 18 8 - 27 mg/dL Creatinine 0.98 0.76 - 1.27 mg/dL GFR est non-AA 79 >59 mL/min/1.73 GFR est AA 92 >59 mL/min/1.73  
 BUN/Creatinine ratio 18 10 - 24 Sodium 139 134 - 144 mmol/L Potassium 4.7 3.5 - 5.2 mmol/L Chloride 99 96 - 106 mmol/L  
 CO2 20 18 - 29 mmol/L Calcium 10.0 8.6 - 10.2 mg/dL Protein, total 6.7 6.0 - 8.5 g/dL Albumin 4.6 3.6 - 4.8 g/dL GLOBULIN, TOTAL 2.1 1.5 - 4.5 g/dL A-G Ratio 2.2 1.2 - 2.2 Bilirubin, total 0.3 0.0 - 1.2 mg/dL Alk. phosphatase 74 39 - 117 IU/L  
 AST (SGOT) 30 0 - 40 IU/L  
 ALT (SGPT) 22 0 - 44 IU/L Narrative Performed at:  [address]  [id number] : Patience Faustin MD, Phone:  [phone] LIPID PANEL Result Value Ref Range Cholesterol, total 151 100 - 199 mg/dL Triglyceride 99 0 - 149 mg/dL HDL Cholesterol 53 >39 mg/dL VLDL, calculated 20 5 - 40 mg/dL LDL, calculated 78 0 - 99 mg/dL Narrative Performed at:  [address]  [id number] : Jeannine Kumar MD, Phone:  [phone] TSH 3RD GENERATION Result Value Ref Range TSH 3.190 0.450 - 4.500 uIU/mL Narrative Performed at:  [address]  [id number] : Jeannine Kumar MD, Phone:  [phone] PSA SCREENING (SCREENING) Result Value Ref Range Prostate Specific Ag 3.6 0.0 - 4.0 ng/mL Comment:  
   Roche ECLIA methodology. According to the American Urological Association, Serum PSA should 
decrease and remain at undetectable levels after radical 
prostatectomy. The AUA defines biochemical recurrence as an initial 
PSA value 0.2 ng/mL or greater followed by a subsequent confirmatory PSA value 0.2 ng/mL or greater. Values obtained with different assay methods or kits cannot be used 
interchangeably. Results cannot be interpreted as absolute evidence 
of the presence or absence of malignant disease. Narrative Performed at:  [address]  [id number] : Jeannine Kumar MD, Phone:  [phone] HEMOGLOBIN A1C WITH EAG Result Value Ref Range Hemoglobin A1c 5.9 (H) 4.8 - 5.6 % Comment:  
            Pre-diabetes: 5.7 - 6.4 Diabetes: >6.4 Glycemic control for adults with diabetes: <7.0 Estimated average glucose 123 mg/dL Narrative Performed at:  [address]  [id number] : Jeannine Kumar MD, Phone:  [phone] MICROALBUMIN, UR, RAND W/ MICROALBUMIN/CREA RATIO Result Value Ref Range Creatinine, urine 57.6 Not Estab. mg/dL Microalbumin, urine <3.0 Not Estab. ug/mL Comment:    **Verified by repeat analysis**  
 Microalb/Creat ratio (ug/mg creat.) <5.2 0.0 - 30.0 mg/g creat Narrative Performed at:  [address]  [id number] : Jeannine Kumar MD, Phone:  [phone] CVD REPORT Result Value Ref Range INTERPRETATION Note Comment:  
   Supplement report is available. Narrative Performed at:  [address] [place] [address]  [id number] : Murtaza Calero PhD, Phone:  [phone] DIABETES PATIENT EDUCATION Result Value Ref Range PDF Image Not applicable Narrative Performed at:  [address] [place] [address]  [id number] : Murtaza Calero PhD, Phone:  [phone] RECOMMENDATIONS: 
Please let wife know that his labs overall lab great but his PSA has gone up quite a bit, still in normal range but made big jump and even small changes can mean a problem.  Does he have urologist? If not make appointment with [facility] at [phone]. Does he have symptoms of difficulty urinating? He stated he does to nurse on the phone on 7/21/17. Bethany Simmons Please call me if you have any questions: [phone] Sincerely, 
 
 
Sherry Jovel NP

## 2017-08-18 NOTE — PROGRESS NOTES
This is a Subsequent Medicare Annual Wellness Visit providing Personalized Prevention Plan Services (PPPS) (Performed 12 months after initial AWV and PPPS )  I have reviewed the patient's medical history in detail and updated the computerized patient record. History     Past Medical History:   Diagnosis Date    [facility]ism [facility]     CAD (coronary artery disease) 6/15/2010    DM (diabetes mellitus) ([facility] 6/15/2010    Falls     Heart disease     HTN (hypertension) 6/15/2010    Hypercholesteremia 6/15/2010    PTSD (post-traumatic stress disorder)     Skin cancer 6/15/2010    Smoker 6/15/2010    Snoring       Past Surgical History:   Procedure Laterality Date    CARDIAC SURG PROCEDURE UNLIST      stent     Current Outpatient Prescriptions   Medication Sig Dispense Refill    donepezil (ARICEPT) 10 mg tablet TK 1 T PO Q NIGHT  5    memantine-donepezil (NAMZARIC) 28-10 mg CSpX Take 1 Cap by mouth daily. 30 Cap 5    DOCOSAHEXANOIC ACID/EPA (FISH OIL PO) Take 1,200 mg by mouth daily.  PV W-O LAZARUS/FERROUS FUMARATE/FA (M-VIT PO) Take  by mouth.  POTASSIUM PO Take 550 mg by mouth.  rosuvastatin (CRESTOR) 20 mg tablet Take 1 Tab by mouth daily. 90 Tab 3    metoprolol succinate (TOPROL XL) 50 mg XL tablet Take 1 Tab by mouth daily. 90 Tab 3    niacin ER (NIASPAN) 500 mg tablet Take 1 Tab by mouth nightly. 90 Tab 3    OMEGA-3 FATTY ACIDS (OMEGA 3 PO) Take  by mouth.  aspirin (ASPIRIN) 325 mg tablet Take 325 mg by mouth daily.        No Known Allergies  Family History   Problem Relation Age of Onset    Cancer Mother     Heart Disease Other      Social History   Substance Use Topics    Smoking status: Current Every Day Smoker     Packs/day: 1.00    Smokeless tobacco: Never Used    Alcohol use 2.4 oz/week     4 Cans of beer per week     Patient Active Problem List   Diagnosis Code    Hypercholesteremia E78.00    Skin cancer C44.90    CAD (coronary artery disease) I25.10    HTN (hypertension) I10    DM (diabetes mellitus) ([facility] E11.9    Smoker F17.200    Advance care planning Z71.89    Advanced care planning/counseling discussion Z71.89       Depression Risk Factor Screening:     PHQ over the last two weeks 8/18/2017   Little interest or pleasure in doing things Not at all   Feeling down, depressed or hopeless Not at all   Total Score PHQ 2 0     Alcohol Risk Factor Screening: On any occasion during the past 3 months, have you had more than 4 drinks containing alcohol? No    Do you average more than 14 drinks per week? No        Functional Ability and Level of Safety:     Hearing Loss   mild    Activities of Daily Living   Self-care. Requires assistance with: no ADLs    Fall Risk   Fall Risk Assessment, last 12 mths 8/18/2017   Able to walk? Yes   Fall in past 12 months? No     Abuse Screen   Patient is not abused    Review of Systems   A comprehensive review of systems was negative except for that written in the HPI.     Physical Examination     Evaluation of Cognitive Function:  Mood/affect:  happy  Appearance: age appropriate  Family member/caregiver input: smoking    Visit Vitals    /62    Pulse (!) 57    Temp 97.9 °F (36.6 °C) (Oral)    Resp 16    Ht 6' 1\" (1.854 m)    Wt 176 lb 8 oz (80.1 kg)    SpO2 98%    BMI 23.29 kg/m2     General appearance: alert, cooperative, no distress, appears stated age  Lungs: clear to auscultation bilaterally  Heart: regular rate and rhythm, S1, S2 normal, no murmur, click, rub or gallop  Extremities: extremities normal, atraumatic, no cyanosis or edema    Patient Care Team:  Tristin Olivera MD as PCP - Fritz Hope MD (Neurology)  Iesha Sheffield NP (Nurse Practitioner)  Cabrera Trejo as Physician (Psychology)    Advice/Referrals/Counseling   Education and counseling provided:  Are appropriate based on today's review and evaluation  End-of-Life planning (with patient's consent)      Assessment/Plan Encounter Diagnoses   Name Primary?  Well adult exam Yes    Type 2 diabetes mellitus without complication, without long-term current use of insulin (HCC)     Hypercholesteremia     Essential hypertension     Advanced care planning/counseling discussion     Screening PSA (prostate specific antigen)      Orders Placed This Encounter    CBC WITH AUTOMATED DIFF    METABOLIC PANEL, COMPREHENSIVE    LIPID PANEL    TSH 3RD GENERATION    PSA SCREENING (SCREENING) ()    HEMOGLOBIN A1C WITH EAG    MICROALBUMIN, UR, RAND W/ MICROALBUMIN/CREA RATIO    donepezil (ARICEPT) 10 mg tablet   . I have discussed the diagnosis with the patient and the intended plan as seen in the above orders. The patient has received an after-visit summary and questions were answered concerning future plans. Patient conveyed understanding of the plan at the time of the visit.     Reji Durant, MSN, ANP  8/18/2017

## 2017-08-18 NOTE — MR AVS SNAPSHOT
Visit Information Date & Time Provider Department Dept. Phone Encounter #  
 8/18/2017  8:15 AM Reshma Estrada NP [address] [facility] [phone] [id number] Your Appointments 9/13/2017  9:00 AM  
Any with Stephanie Frye NP [address] [facility] ([facility]) Appt Note: memory loss cally  
 [address] [place] [address] [phone]  
  
   
 [place] [address] [address] [address] Upcoming Health Maintenance Date Due Hepatitis C Screening 1950 DTaP/Tdap/Td series (1 - Tdap) 4/1/1971 ZOSTER VACCINE AGE 60> 2/1/2010 HEMOGLOBIN A1C Q6M 5/24/2016 COLONOSCOPY 6/21/2016 MEDICARE YEARLY EXAM 11/24/2016 FOOT EXAM Q1 11/24/2016 MICROALBUMIN Q1 11/24/2016 LIPID PANEL Q1 11/24/2016 Pneumococcal 65+ High/Highest Risk (2 of 2 - PPSV23) 12/20/2016 EYE EXAM RETINAL OR DILATED Q1 4/7/2017 INFLUENZA AGE 9 TO ADULT 8/1/2017 GLAUCOMA SCREENING Q2Y 4/7/2018 Allergies as of 8/18/2017  Review Complete On: 8/18/2017 By: Tamra Garcia LPN No Known Allergies Current Immunizations  Reviewed on 11/24/2015 Name Date Influenza Vaccine 9/22/2016, 10/5/2014 Influenza Vaccine Split 11/26/2012, 12/20/2011 Influenza Vaccine Whole 12/20/2011 Pneumococcal Conjugate (PCV-13) 8/1/2017, 11/24/2015 ZZZ-RETIRED (DO NOT USE) Pneumococcal Vaccine (Unspecified Type) 12/20/2011, 12/20/2011 Not reviewed this visit You Were Diagnosed With   
  
 Codes Comments Well adult exam    -  Primary ICD-10-CM: Z00.00 ICD-9-CM: V70.0 Type 2 diabetes mellitus without complication, without long-term current use of insulin (HCC)     ICD-10-CM: E11.9 ICD-9-CM: 250.00 Hypercholesteremia     ICD-10-CM: E78.00 ICD-9-CM: 272.0 Essential hypertension     ICD-10-CM: I10 
ICD-9-CM: 401.9 Advanced care planning/counseling discussion     ICD-10-CM: Z71.89 ICD-9-CM: V65.49 Screening PSA (prostate specific antigen)     ICD-10-CM: Z12.5 ICD-9-CM: V76.44 Vitals BP Pulse Temp Resp Height(growth percentile) Weight(growth percentile) 104/62 (!) 57 97.9 °F (36.6 °C) (Oral) 16 6' 1\" (1.854 m) 176 lb 8 oz (80.1 kg) SpO2 BMI Smoking Status 98% 23.29 kg/m2 Current Every Day Smoker Vitals History BMI and BSA Data Body Mass Index Body Surface Area  
 23.29 kg/m 2 2.03 m 2 Preferred Pharmacy Pharmacy Name Phone [address] [facility] [phone] Your Updated Medication List  
  
   
This list is accurate as of: 8/18/17  8:51 AM.  Always use your most recent med list.  
  
  
  
  
 aspirin 325 mg tablet Commonly known as:  ASPIRIN Take 325 mg by mouth daily. donepezil 10 mg tablet Commonly known as:  ARICEPT TK 1 T PO Q NIGHT  
  
 FISH OIL PO Take 1,200 mg by mouth daily. M-VIT PO Take  by mouth.  
  
 memantine-donepezil 28-10 mg Cspx Commonly known as:  MOTION PICTURE AND [facility] Take 1 Cap by mouth daily. metoprolol succinate 50 mg XL tablet Commonly known as:  TOPROL XL Take 1 Tab by mouth daily. niacin  mg tablet Commonly known as:  Niaspan Take 1 Tab by mouth nightly. OMEGA 3 PO Take  by mouth. POTASSIUM PO Take 550 mg by mouth. rosuvastatin 20 mg tablet Commonly known as:  CRESTOR Take 1 Tab by mouth daily. We Performed the Following CBC WITH AUTOMATED DIFF [15263 CPT(R)] HEMOGLOBIN A1C WITH EAG [57276 CPT(R)] LIPID PANEL [26056 CPT(R)] METABOLIC PANEL, COMPREHENSIVE [99350 CPT(R)] MICROALBUMIN, UR, RAND W/ MICROALBUMIN/CREA RATIO [id number] CPT(R)] PSA SCREENING (SCREENING) [ HCP] TSH 3RD GENERATION [56608 CPT(R)] Introducing [facility]! Evon Billings introduces [employer] patient portal. Now you can access parts of your medical record, email your doctor's office, and request medication refills online. 1. In your internet browser, go to [email or web address] 2. Click on the First Time User? Click Here link in the Sign In box. You will see the New Member Sign Up page. 3. Enter your [employer] Access Code exactly as it appears below. You will not need to use this code after youve completed the sign-up process. If you do not sign up before the expiration date, you must request a new code. · [employer] Access Code: [email or web address] Expires: 11/16/2017  8:05 AM 
 
4. Enter the last four digits of your Social Security Number (xxxx) and Date of Birth (mm/dd/yyyy) as indicated and click Submit. You will be taken to the next sign-up page. 5. Create a [employer] ID. This will be your [employer] login ID and cannot be changed, so think of one that is secure and easy to remember. 6. Create a [employer] password. You can change your password at any time. 7. Enter your Password Reset Question and Answer. This can be used at a later time if you forget your password. 8. Enter your e-mail address. You will receive e-mail notification when new information is available in [address] 9. Click Sign Up. You can now view and download portions of your medical record. 10. Click the Download Summary menu link to download a portable copy of your medical information. If you have questions, please visit the Frequently Asked Questions section of the [employer] website. Remember, [employer] is NOT to be used for urgent needs. For medical emergencies, dial 911. Now available from your iPhone and Android! Please provide this summary of care documentation to your next provider. Your primary care clinician is listed as TATIANA BRAUN. If you have any questions after today's visit, please call [phone].

## 2017-08-18 NOTE — PROGRESS NOTES
1. Have you been to the ER, urgent care clinic since your last visit? Hospitalized since your last visit? No    2. Have you seen or consulted any other health care providers outside of the [address] [place] [address] since your last visit? Include any pap smears or colon screening.  No    Chief Complaint   Patient presents with    Complete Physical     [address]     fasting

## 2017-08-19 LAB
ALBUMIN SERPL-MCNC: 4.6 G/DL (ref 3.6–4.8)
ALBUMIN/CREAT UR: <5.2 MG/G CREAT (ref 0–30)
ALBUMIN/GLOB SERPL: 2.2 {RATIO} (ref 1.2–2.2)
ALP SERPL-CCNC: 74 IU/L (ref 39–117)
ALT SERPL-CCNC: 22 IU/L (ref 0–44)
AST SERPL-CCNC: 30 IU/L (ref 0–40)
BASOPHILS # BLD AUTO: 0 X10E3/UL (ref 0–0.2)
BASOPHILS NFR BLD AUTO: 1 %
BILIRUB SERPL-MCNC: 0.3 MG/DL (ref 0–1.2)
BUN SERPL-MCNC: 18 MG/DL (ref 8–27)
BUN/CREAT SERPL: 18 (ref 10–24)
CALCIUM SERPL-MCNC: 10 MG/DL (ref 8.6–10.2)
CHLORIDE SERPL-SCNC: 99 MMOL/L (ref 96–106)
CHOLEST SERPL-MCNC: 151 MG/DL (ref 100–199)
CO2 SERPL-SCNC: 20 MMOL/L (ref 18–29)
CREAT SERPL-MCNC: 0.98 MG/DL (ref 0.76–1.27)
CREAT UR-MCNC: 57.6 MG/DL
EOSINOPHIL # BLD AUTO: 0.2 X10E3/UL (ref 0–0.4)
EOSINOPHIL NFR BLD AUTO: 3 %
ERYTHROCYTE [DISTWIDTH] IN BLOOD BY AUTOMATED COUNT: 13.6 % (ref 12.3–15.4)
EST. AVERAGE GLUCOSE BLD GHB EST-MCNC: 123 MG/DL
GLOBULIN SER CALC-MCNC: 2.1 G/DL (ref 1.5–4.5)
GLUCOSE SERPL-MCNC: 126 MG/DL (ref 65–99)
HBA1C MFR BLD: 5.9 % (ref 4.8–5.6)
HCT VFR BLD AUTO: 47.4 % (ref 37.5–51)
HDLC SERPL-MCNC: 53 MG/DL
HGB BLD-MCNC: 16.2 G/DL (ref 12.6–17.7)
IMM GRANULOCYTES # BLD: 0 X10E3/UL (ref 0–0.1)
IMM GRANULOCYTES NFR BLD: 0 %
INTERPRETATION, 910389: NORMAL
LDLC SERPL CALC-MCNC: 78 MG/DL (ref 0–99)
LYMPHOCYTES # BLD AUTO: 2.8 X10E3/UL (ref 0.7–3.1)
LYMPHOCYTES NFR BLD AUTO: 38 %
Lab: NORMAL
MCH RBC QN AUTO: 32.8 PG (ref 26.6–33)
MCHC RBC AUTO-ENTMCNC: 34.2 G/DL (ref 31.5–35.7)
MCV RBC AUTO: 96 FL (ref 79–97)
MICROALBUMIN UR-MCNC: <3 UG/ML
MONOCYTES # BLD AUTO: 0.5 X10E3/UL (ref 0.1–0.9)
MONOCYTES NFR BLD AUTO: 7 %
NEUTROPHILS # BLD AUTO: 3.8 X10E3/UL (ref 1.4–7)
NEUTROPHILS NFR BLD AUTO: 51 %
PLATELET # BLD AUTO: 189 X10E3/UL (ref 150–379)
POTASSIUM SERPL-SCNC: 4.7 MMOL/L (ref 3.5–5.2)
PROT SERPL-MCNC: 6.7 G/DL (ref 6–8.5)
PSA SERPL-MCNC: 3.6 NG/ML (ref 0–4)
RBC # BLD AUTO: 4.94 X10E6/UL (ref 4.14–5.8)
SODIUM SERPL-SCNC: 139 MMOL/L (ref 134–144)
TRIGL SERPL-MCNC: 99 MG/DL (ref 0–149)
TSH SERPL DL<=0.005 MIU/L-ACNC: 3.19 UIU/ML (ref 0.45–4.5)
VLDLC SERPL CALC-MCNC: 20 MG/DL (ref 5–40)
WBC # BLD AUTO: 7.3 X10E3/UL (ref 3.4–10.8)

## 2017-08-21 NOTE — PROGRESS NOTES
Pt notified (confirmed x 3). Patient verbalized understanding. Send result letter and referred to [facility] for f/u.

## 2017-08-22 DIAGNOSIS — E78.00 HYPERCHOLESTEREMIA: ICD-10-CM

## 2017-08-22 RX ORDER — METOPROLOL SUCCINATE 50 MG/1
50 TABLET, EXTENDED RELEASE ORAL DAILY
Qty: 90 TAB | Refills: 3 | Status: SHIPPED | OUTPATIENT
Start: 2017-08-22 | End: 2018-08-20 | Stop reason: SDUPTHER

## 2017-08-22 RX ORDER — NIACIN 500 MG/1
500 TABLET, EXTENDED RELEASE ORAL
Qty: 90 TAB | Refills: 3 | Status: SHIPPED | OUTPATIENT
Start: 2017-08-22 | End: 2018-08-20 | Stop reason: SDUPTHER

## 2017-08-22 RX ORDER — ROSUVASTATIN CALCIUM 20 MG/1
20 TABLET, COATED ORAL DAILY
Qty: 90 TAB | Refills: 3 | Status: SHIPPED | OUTPATIENT
Start: 2017-08-22 | End: 2018-08-20 | Stop reason: SDUPTHER

## 2017-08-22 NOTE — TELEPHONE ENCOUNTER
----- Message from Edmond Morin sent at 8/22/2017 11:43 AM EDT -----  Regarding: James/Rx  Pts wife Montez Gordon is requesting a Rx for Niacin,Metoprolol and Crestor. Ms Chatman [facility] number is [phone]. She is returning a call from yesterday.

## 2017-09-13 ENCOUNTER — OFFICE VISIT (OUTPATIENT)
Dept: NEUROLOGY | Age: 67
End: 2017-09-13

## 2017-09-13 VITALS
DIASTOLIC BLOOD PRESSURE: 68 MMHG | HEIGHT: 73 IN | BODY MASS INDEX: 23.59 KG/M2 | SYSTOLIC BLOOD PRESSURE: 116 MMHG | WEIGHT: 178 LBS

## 2017-09-13 DIAGNOSIS — G30.0 EARLY ONSET ALZHEIMER'S DEMENTIA WITHOUT BEHAVIORAL DISTURBANCE (HCC): Primary | ICD-10-CM

## 2017-09-13 DIAGNOSIS — F02.80 EARLY ONSET ALZHEIMER'S DEMENTIA WITHOUT BEHAVIORAL DISTURBANCE (HCC): Primary | ICD-10-CM

## 2017-09-13 NOTE — PROGRESS NOTES
Pt states he is here for follow up. Since last visit pt states that he has been very productive and interactive with his family. Pt believes he is improving or learning to live around the memory loss and doesn't feel as depressed. Pt's wife states that his short term memory is what pt has issues with, and pt get frustrated when he can't think of something. Pt is accompanied by his wife.

## 2017-09-13 NOTE — PROGRESS NOTES
Leta Grandchild is a 79 y.o. male who presents with the following  Chief Complaint   Patient presents with    Follow-up     memory loss       HPI Patient comes in with wife for a follow up for memory loss. Currently on Namzaric 28-10 and doing well with this. Feels his memory is about the same she may feel like the short term has been a little worse. Having to tell him more and repeat herself. He has trouble getting out what he wants to say and will use words such as thing a ma jiggy and such. . He mostly has trouble with short term memory remembering where he puts things. Remembering where in the house things are. Wife can help when he gets to the point where he is getting frustrated. Doing more out in the yard such as mowing grass, yard work, also likes to watch tv. Trying to pick back up doing models and puzzles but he needs to find something that interests him. No trouble with driving. Agitated at times at little things but can get back to his normal without issues. Doing a lot of things with [place]. Wife helps if he has any issues. No Known Allergies    Current Outpatient Prescriptions   Medication Sig    memantine-donepezil (NAMZARIC) 28-10 mg CSpX Take 1 Cap by mouth daily.  rosuvastatin (CRESTOR) 20 mg tablet Take 1 Tab by mouth daily.  niacin ER (NIASPAN) 500 mg tablet Take 1 Tab by mouth nightly.  metoprolol succinate (TOPROL XL) 50 mg XL tablet Take 1 Tab by mouth daily.  donepezil (ARICEPT) 10 mg tablet TK 1 T PO Q NIGHT    DOCOSAHEXANOIC ACID/EPA (FISH OIL PO) Take 1,200 mg by mouth daily.  PV W-O LAZARUS/FERROUS FUMARATE/FA (M-VIT PO) Take  by mouth.  POTASSIUM PO Take 550 mg by mouth.  OMEGA-3 FATTY ACIDS (OMEGA 3 PO) Take  by mouth.  aspirin (ASPIRIN) 325 mg tablet Take 325 mg by mouth daily. No current facility-administered medications for this visit.         History   Smoking Status    Current Every Day Smoker    Packs/day: 1.00   Smokeless Tobacco    Never Used Past Medical History:   Diagnosis Date    Alcoholism ([address]     CAD (coronary artery disease) 6/15/2010    DM (diabetes mellitus) ([address] 6/15/2010    Falls     Heart disease     HTN (hypertension) 6/15/2010    Hypercholesteremia 6/15/2010    PTSD (post-traumatic stress disorder)     Skin cancer 6/15/2010    Smoker 6/15/2010    Snoring        Past Surgical History:   Procedure Laterality Date    CARDIAC SURG PROCEDURE UNLIST      stent       Family History   Problem Relation Age of Onset    Cancer Mother     Heart Disease Other        Social History     Social History    Marital status:      Spouse name: N/A    Number of children: N/A    Years of education: N/A     Social History Main Topics    Smoking status: Current Every Day Smoker     Packs/day: 1.00    Smokeless tobacco: Never Used    Alcohol use 2.4 oz/week     4 Cans of beer per week    Drug use: No    Sexual activity: No     Other Topics Concern    None     Social History Narrative       Review of Systems   HENT: Negative for ear pain, hearing loss and tinnitus. Eyes: Negative for blurred vision, double vision and photophobia. Respiratory: Negative for shortness of breath and wheezing. Cardiovascular: Negative for chest pain and palpitations. Gastrointestinal: Negative for nausea and vomiting. Neurological: Negative for dizziness, tingling, seizures, loss of consciousness, weakness and headaches. Psychiatric/Behavioral: Positive for memory loss. Negative for depression. The patient does not have insomnia. Remainder of comprehensive review is negative. Physical Exam :    Visit Vitals    /68    Ht 6' 1\" (1.854 m)    Wt 80.7 kg (178 lb)    BMI 23.48 kg/m2       General: Well defined, nourished, and groomed individual in no acute distress.    Neck: Supple, nontender, no bruits, no pain with resistance to active range of motion.    Heart: Regular rate and rhythm, no murmurs, rub, or gallop. Normal S1S2. Lungs: Clear to auscultation bilaterally with equal chest expansion, no cough, no wheeze  Musculoskeletal: Extremities revealed no edema and had full range of motion of joints.    Psych: Good mood and bright affect    NEUROLOGICAL EXAMINATION:    Mental Status: Alert and oriented to person, place, and time. mmse 25     Cranial Nerves:    II, III, IV, VI: Visual acuity grossly intact. Visual fields are normal.    Pupils are equal, round, and reactive to light and accommodation.    Extra-ocular movements are full and fluid. Fundoscopic exam was benign, no ptosis or nystagmus.    V-XII: Hearing is grossly intact. Facial features are symmetric, with normal sensation and strength. The palate rises symmetrically and the tongue protrudes midline. Sternocleidomastoids 5/5. Motor Examination: Normal tone, bulk, and strength, 5/5 muscle strength throughout. Coordination: Finger to nose was normal. No resting or intention tremor    Gait and Station: Steady while walking. Normal arm swing. No pronator drift. No muscle wasting or fasiculations noted. Reflexes: DTRs 2+ throughout. Results for orders placed or performed in visit on 08/18/17   CBC WITH AUTOMATED DIFF   Result Value Ref Range    WBC 7.3 3.4 - 10.8 x10E3/uL    RBC 4.94 4.14 - 5.80 x10E6/uL    HGB 16.2 12.6 - 17.7 g/dL    HCT 47.4 37.5 - 51.0 %    MCV 96 79 - 97 fL    MCH 32.8 26.6 - 33.0 pg    MCHC 34.2 31.5 - 35.7 g/dL    RDW 13.6 12.3 - 15.4 %    PLATELET 498 356 - 608 x10E3/uL    NEUTROPHILS 51 %    Lymphocytes 38 %    MONOCYTES 7 %    EOSINOPHILS 3 %    BASOPHILS 1 %    ABS. NEUTROPHILS 3.8 1.4 - 7.0 x10E3/uL    Abs Lymphocytes 2.8 0.7 - 3.1 x10E3/uL    ABS. MONOCYTES 0.5 0.1 - 0.9 x10E3/uL    ABS. EOSINOPHILS 0.2 0.0 - 0.4 x10E3/uL    ABS. BASOPHILS 0.0 0.0 - 0.2 x10E3/uL    IMMATURE GRANULOCYTES 0 %    ABS. IMM.  GRANS. 0.0 0.0 - 0.1 H63T4/WM   METABOLIC PANEL, COMPREHENSIVE   Result Value Ref Range    Glucose 126 (H) 65 - 99 mg/dL    BUN 18 8 - 27 mg/dL    Creatinine 0.98 0.76 - 1.27 mg/dL    GFR est non-AA 79 >59 mL/min/1.73    GFR est AA 92 >59 mL/min/1.73    BUN/Creatinine ratio 18 10 - 24    Sodium 139 134 - 144 mmol/L    Potassium 4.7 3.5 - 5.2 mmol/L    Chloride 99 96 - 106 mmol/L    CO2 20 18 - 29 mmol/L    Calcium 10.0 8.6 - 10.2 mg/dL    Protein, total 6.7 6.0 - 8.5 g/dL    Albumin 4.6 3.6 - 4.8 g/dL    GLOBULIN, TOTAL 2.1 1.5 - 4.5 g/dL    A-G Ratio 2.2 1.2 - 2.2    Bilirubin, total 0.3 0.0 - 1.2 mg/dL    Alk. phosphatase 74 39 - 117 IU/L    AST (SGOT) 30 0 - 40 IU/L    ALT (SGPT) 22 0 - 44 IU/L   LIPID PANEL   Result Value Ref Range    Cholesterol, total 151 100 - 199 mg/dL    Triglyceride 99 0 - 149 mg/dL    HDL Cholesterol 53 >39 mg/dL    VLDL, calculated 20 5 - 40 mg/dL    LDL, calculated 78 0 - 99 mg/dL   TSH 3RD GENERATION   Result Value Ref Range    TSH 3.190 0.450 - 4.500 uIU/mL   PSA SCREENING (SCREENING)   Result Value Ref Range    Prostate Specific Ag 3.6 0.0 - 4.0 ng/mL   HEMOGLOBIN A1C WITH EAG   Result Value Ref Range    Hemoglobin A1c 5.9 (H) 4.8 - 5.6 %    Estimated average glucose 123 mg/dL   MICROALBUMIN, UR, RAND W/ MICROALBUMIN/CREA RATIO   Result Value Ref Range    Creatinine, urine 57.6 Not Estab. mg/dL    Microalbumin, urine <3.0 Not Estab. ug/mL    Microalb/Creat ratio (ug/mg creat.) <5.2 0.0 - 30.0 mg/g creat   CVD REPORT   Result Value Ref Range    INTERPRETATION Note    DIABETES PATIENT EDUCATION   Result Value Ref Range    PDF Image Not applicable        Orders Placed This Encounter    memantine-donepezil (NAMZARIC) 28-10 mg CSpX     Sig: Take 1 Cap by mouth daily. Dispense:  30 Cap     Refill:  5       1. Early onset Alzheimer's dementia without behavioral disturbance        Follow-up Disposition:  Return in about 4 months (around 1/13/2018). Memory loss. Seems to be progressing with slightly worsening short term per wife. Continue Namzaric at this time for preservation.  Need to remember to stay mentally more active with puzzles, computer games, putting together models.  Staying physically active but keep this up         This note will not be viewable in [employer]

## 2017-09-13 NOTE — MR AVS SNAPSHOT
Visit Information Date & Time Provider Department Dept. Phone Encounter #  
 9/13/2017  9:00 AM RAYMOND Nielson [facility]rology [facility] [phone] [id number] Follow-up Instructions Return in about 4 months (around 1/13/2018). Upcoming Health Maintenance Date Due Hepatitis C Screening 1950 DTaP/Tdap/Td series (1 - Tdap) 4/1/1971 ZOSTER VACCINE AGE 60> 2/1/2010 COLONOSCOPY 6/21/2016 FOOT EXAM Q1 11/24/2016 EYE EXAM RETINAL OR DILATED Q1 4/7/2017 INFLUENZA AGE 9 TO ADULT 8/1/2017 Pneumococcal 65+ High/Highest Risk (2 of 2 - PPSV23) 9/26/2017 HEMOGLOBIN A1C Q6M 2/18/2018 GLAUCOMA SCREENING Q2Y 4/7/2018 MICROALBUMIN Q1 8/18/2018 LIPID PANEL Q1 8/18/2018 MEDICARE YEARLY EXAM 8/19/2018 Allergies as of 9/13/2017  Review Complete On: 9/13/2017 By: Blas Dark No Known Allergies Current Immunizations  Reviewed on 11/24/2015 Name Date Influenza Vaccine 9/22/2016, 10/5/2014 Influenza Vaccine Split 11/26/2012, 12/20/2011 Influenza Vaccine Whole 12/20/2011 Pneumococcal Conjugate (PCV-13) 8/1/2017, 11/24/2015 ZZZ-RETIRED (DO NOT USE) Pneumococcal Vaccine (Unspecified Type) 12/20/2011, 12/20/2011 Not reviewed this visit You Were Diagnosed With   
  
 Codes Comments Early onset Alzheimer's dementia without behavioral disturbance    -  Primary ICD-10-CM: G30.0, F02.80 ICD-9-CM: 331.0, 294.10 Vitals BP Height(growth percentile) Weight(growth percentile) BMI Smoking Status 116/68 6' 1\" (1.854 m) 178 lb (80.7 kg) 23.48 kg/m2 Current Every Day Smoker Vitals History BMI and BSA Data Body Mass Index Body Surface Area  
 23.48 kg/m 2 2.04 m 2 Preferred Pharmacy Pharmacy [facility] Phone [facility] [address] [place], [facility] [phone] Your Updated Medication List  
  
   
 This list is accurate as of: 9/13/17  9:36 AM.  Always use your most recent med list.  
  
  
  
  
 aspirin 325 mg tablet Commonly known as:  ASPIRIN Take 325 mg by mouth daily. donepezil 10 mg tablet Commonly known as:  ARICEPT TK 1 T PO Q NIGHT  
  
 FISH OIL PO Take 1,200 mg by mouth daily. M-VIT PO Take  by mouth.  
  
 memantine-donepezil 28-10 mg Cspx Commonly known as:  [facility] Take 1 Cap by mouth daily. metoprolol succinate 50 mg XL tablet Commonly known as:  TOPROL XL Take 1 Tab by mouth daily. niacin  mg tablet Commonly known as:  Niaspan Take 1 Tab by mouth nightly. OMEGA 3 PO Take  by mouth. POTASSIUM PO Take 550 mg by mouth. rosuvastatin 20 mg tablet Commonly known as:  CRESTOR Take 1 Tab by mouth daily. Prescriptions Sent to Pharmacy Refills  
 memantine-donepezil (NAMZARIC) 28-10 mg CSpX 5 Sig: Take 1 Cap by mouth daily. Class: Normal  
 Pharmacy: [facility] [address] [place] [address] Ph #: [phone] Route: Oral  
  
Follow-up Instructions Return in about 4 months (around 1/13/2018). Patient Instructions PRESCRIPTION REFILL POLICY [facility] Statement to Patients April 1, 2014 In an effort to ensure the large volume of patient prescription refills is processed in the most efficient and expeditious manner, we are asking our patients to assist us by calling your Pharmacy for all prescription refills, this will include also your  Mail Order Pharmacy. The pharmacy will contact our office electronically to continue the refill process. Please do not wait until the last minute to call your pharmacy. We need at least 48 hours (2days) to fill prescriptions. We also encourage you to call your pharmacy before going to  your prescription to make sure it is ready. With regard to controlled substance prescription refill requests (narcotic refills) that need to be picked up at our office, we ask your cooperation by providing us with at least 72 hours (3days) notice that you will need a refill. We will not refill narcotic prescription refill requests after 4:00pm on any weekday, Monday through Thursday, or after 2:00pm on Fridays, or on the weekends. We encourage everyone to explore another way of getting your prescription refill request processed using COH, our patient web portal through our electronic medical record system. COH is an efficient and effective way to communicate your medication request directly to the office and  downloadable as an kevin on your smart phone . COH also features a review functionality that allows you to view your medication list as well as leave messages for your physician. Are you ready to get connected? If so please review the attatched instructions or speak to any of our staff to get you set up right away! Thank you so much for your cooperation. Should you have any questions please contact our Practice Administrator. The Physicians and Staff,  [facility] Lidia Champagne [address] What is a living will? A living will is a legal form you use to write down the kind of care you want at the end of your life. It is used by the health professionals who will treat you if you aren't able to decide for yourself. If you put your wishes in writing, your loved ones and others will know what kind of care you want. They won't need to guess. This can ease your mind and be helpful to others. A living will is not the same as an estate or property will. An estate will explains what you want to happen with your money and property after you die. Is a living will a legal document? A living will is a legal document.  Each state has its own laws about living luther. If you move to another state, make sure that your living will is legal in the state where you now live. Or you might use a universal form that has been approved by many states. This kind of form can sometimes be completed and stored online. Your electronic copy will then be available wherever you have a connection to the Internet. In most cases, doctors will respect your wishes even if you have a form from a different state. · You don't need an  to complete a living will. But legal advice can be helpful if your state's laws are unclear, your health history is complicated, or your family can't agree on what should be in your living will. · You can change your living will at any time. Some people find that their wishes about end-of-life care change as their health changes. · In addition to making a living will, think about completing a medical power of  form. This form lets you name the person you want to make end-of-life treatment decisions for you (your \"health care agent\") if you're not able to. Many hospitals and nursing homes will give you the forms you need to complete a living will and a medical power of . · Your living will is used only if you can't make or communicate decisions for yourself anymore. If you become able to make decisions again, you can accept or refuse any treatment, no matter what you wrote in your living will. · Your state may offer an online registry. This is a place where you can store your living will online so the doctors and nurses who need to treat you can find it right away. What should you think about when creating a living will? Talk about your end-of-life wishes with your family members and your doctor. Let them know what you want. That way the people making decisions for you won't be surprised by your choices. Think about these questions as you make your living will: · Do you know enough about life support methods that might be used? If not, talk to your doctor so you know what might be done if you can't breathe on your own, your heart stops, or you're unable to swallow. · What things would you still want to be able to do after you receive life-support methods? Would you want to be able to walk? To speak? To eat on your own? To live without the help of machines? · If you have a choice, where do you want to be cared for? In your home? At a hospital or nursing home? · Do you want certain Orthodoxy practices performed if you become very ill? · If you have a choice at the end of your life, where would you prefer to die? At home? In a hospital or nursing home? Somewhere else? · Would you prefer to be buried or cremated? · Do you want your organs to be donated after you die? What should you do with your living will? · Make sure that your family members and your health care agent have copies of your living will. · Give your doctor a copy of your living will to keep in your medical record. If you have more than one doctor, make sure that each one has a copy. · You may want to put a copy of your living will where it can be easily found. Where can you learn more? Go to http://olu-vick.info/. Enter F066 in the search box to learn more about \"Learning About Living Samuel. \" Current as of: August 8, 2016 Content Version: 11.3 © 9410-9598 [employer]. Care instructions adapted under license by [employer] (which disclaims liability or warranty for this information). If you have questions about a medical condition or this instruction, always ask your healthcare professional. [place] [address] any warranty or liability for your use of this information. Advance Directives: Care Instructions Your Care Instructions An advance directive is a legal way to state your wishes at the end of your life. It tells your family and your doctor what to do if you can no longer say what you want. There are two main types of advance directives. You can change them any time that your wishes change. · A living will tells your family and your doctor your wishes about life support and other treatment. · A durable power of  for health care lets you name a person to make treatment decisions for you when you can't speak for yourself. This person is called a health care agent. If you do not have an advance directive, decisions about your medical care may be made by a doctor or a  who doesn't know you. It may help to think of an advance directive as a gift to the people who care for you. If you have one, they won't have to make tough decisions by themselves. Follow-up care is a key part of your treatment and safety. Be sure to make and go to all appointments, and call your doctor if you are having problems. It's also a good idea to know your test results and keep a list of the medicines you take. How can you care for yourself at home? · Discuss your wishes with your loved ones and your doctor. This way, there are no surprises. · Many states have a unique form. Or you might use a universal form that has been approved by many states. This kind of form can sometimes be completed and stored online. Your electronic copy will then be available wherever you have a connection to the Internet. In most cases, doctors will respect your wishes even if you have a form from a different state. · You don't need a  to do an advance directive. But you may want to get legal advice. · Think about these questions when you prepare an advance directive: ¨ Who do you want to make decisions about your medical care if you are not able to? Many people choose a family member or close friend. ¨ Do you know enough about life support methods that might be used? If not, talk to your doctor so you understand. ¨ What are you most afraid of that might happen? You might be afraid of having pain, losing your independence, or being kept alive by machines. ¨ Where would you prefer to die? Choices include your home, a hospital, or a nursing home. ¨ Would you like to have information about hospice care to support you and your family? ¨ Do you want to donate organs when you die? ¨ Do you want certain Presybeterian practices performed before you die? If so, put your wishes in the advance directive. · Read your advance directive every year, and make changes as needed. When should you call for help? Be sure to contact your doctor if you have any questions. Where can you learn more? Go to http://olu-vick.info/. Enter R264 in the search box to learn more about \"Advance Directives: Care Instructions. \" Current as of: November 17, 2016 Content Version: 11.3 © 0879-8299 [employer]. Care instructions adapted under license by [employer] (which disclaims liability or warranty for this information). If you have questions about a medical condition or this instruction, always ask your healthcare professional. [place] [address] any warranty or liability for your use of this information. Introducing [facility]! Cat Lundberg introduces [employer] patient portal. Now you can access parts of your medical record, email your doctor's office, and request medication refills online. 1. In your internet browser, go to [email or web address] 2. Click on the First Time User? Click Here link in the Sign In box. You will see the New Member Sign Up page. 3. Enter your [employer] Access Code exactly as it appears below. You will not need to use this code after youve completed the sign-up process. If you do not sign up before the expiration date, you must request a new code. · [employer] Access Code: [email or web address] Expires: 11/16/2017  8:05 AM 
 
 4. Enter the last four digits of your Social Security Number (xxxx) and Date of Birth (mm/dd/yyyy) as indicated and click Submit. You will be taken to the next sign-up page. 5. Create a [employer] ID. This will be your [employer] login ID and cannot be changed, so think of one that is secure and easy to remember. 6. Create a [employer] password. You can change your password at any time. 7. Enter your Password Reset Question and Answer. This can be used at a later time if you forget your password. 8. Enter your e-mail address. You will receive e-mail notification when new information is available in [address] 9. Click Sign Up. You can now view and download portions of your medical record. 10. Click the Download Summary menu link to download a portable copy of your medical information. If you have questions, please visit the Frequently Asked Questions section of the [employer] website. Remember, [employer] is NOT to be used for urgent needs. For medical emergencies, dial 911. Now available from your iPhone and Android! Please provide this summary of care documentation to your next provider. Your primary care clinician is listed as TATIANA BRAUN. If you have any questions after today's visit, please call [phone].

## 2017-10-13 ENCOUNTER — TELEPHONE (OUTPATIENT)
Dept: NEUROLOGY | Age: 67
End: 2017-10-13

## 2017-10-13 NOTE — TELEPHONE ENCOUNTER
Called and spoke to patients wife. She will come to the office today to come get samples.      Per NP:  Can give him 5 boxes of samples     And call if he runs out and we will give more   Thanks  (Routing comment)

## 2017-10-13 NOTE — TELEPHONE ENCOUNTER
Called and spoke to patients wife. She states pharmacy has been trying to contact us to let us know insurance needs PA for namzaric. PA has been started today 10/13/17. Patient will be out of his medication today. Please advise.

## 2017-10-13 NOTE — TELEPHONE ENCOUNTER
----- Message from Eduardo Cartwright sent at 10/13/2017  8:11 AM EDT -----  Regarding: NP Arthur Calvillo / Refill  Pt's wife(Magdalena Zaman) stated she has called 3 times and [facility] has tried to reach the office multiple times for two weeks for Rx \"Namzaric\" Pt will run out of his medication today.      [phone]

## 2017-11-06 ENCOUNTER — TELEPHONE (OUTPATIENT)
Dept: NEUROLOGY | Age: 67
End: 2017-11-06

## 2017-11-06 DIAGNOSIS — F03.90 DEMENTIA WITHOUT BEHAVIORAL DISTURBANCE, UNSPECIFIED DEMENTIA TYPE: Primary | ICD-10-CM

## 2017-11-06 RX ORDER — MEMANTINE HYDROCHLORIDE 10 MG/1
10 TABLET ORAL 2 TIMES DAILY
Qty: 60 TAB | Refills: 5 | Status: SHIPPED | OUTPATIENT
Start: 2017-11-06 | End: 2018-04-24 | Stop reason: SDUPTHER

## 2017-11-06 RX ORDER — DONEPEZIL HYDROCHLORIDE 10 MG/1
10 TABLET, FILM COATED ORAL
Qty: 30 TAB | Refills: 5 | Status: SHIPPED | OUTPATIENT
Start: 2017-11-06 | End: 2018-04-24 | Stop reason: SDUPTHER

## 2017-11-06 NOTE — TELEPHONE ENCOUNTER
Informed patient. Patient stated understanding. Will call with any concerns. Per NP:      Sent Aricept 10 mg to the pharmacy take 1 tablet by mouth daily. And Namenda 10 mg tablets take 1 tablet by mouth BID. Same medications just different dosing and generic . Call with a ny concerns.   (Routing comment)

## 2017-11-06 NOTE — TELEPHONE ENCOUNTER
PT's spouse called stating that the Memantine-donepezil [facility] [place]) 28 mg will no longer be approved by insurance. Was requesting information about alternate or generic medication in place of namzaric. PT has 1 week left of medication.  Please call as soon as possible @ [phone]

## 2018-02-06 ENCOUNTER — OFFICE VISIT (OUTPATIENT)
Dept: NEUROLOGY | Age: 68
End: 2018-02-06

## 2018-02-06 VITALS
HEIGHT: 73 IN | WEIGHT: 182 LBS | DIASTOLIC BLOOD PRESSURE: 66 MMHG | SYSTOLIC BLOOD PRESSURE: 104 MMHG | BODY MASS INDEX: 24.12 KG/M2

## 2018-02-06 DIAGNOSIS — G30.0 EARLY ONSET ALZHEIMER'S DEMENTIA WITHOUT BEHAVIORAL DISTURBANCE (HCC): Primary | ICD-10-CM

## 2018-02-06 DIAGNOSIS — F02.80 EARLY ONSET ALZHEIMER'S DEMENTIA WITHOUT BEHAVIORAL DISTURBANCE (HCC): Primary | ICD-10-CM

## 2018-02-06 RX ORDER — DEXTROAMPHETAMINE SACCHARATE, AMPHETAMINE ASPARTATE, DEXTROAMPHETAMINE SULFATE AND AMPHETAMINE SULFATE 2.5; 2.5; 2.5; 2.5 MG/1; MG/1; MG/1; MG/1
10 TABLET ORAL DAILY
Qty: 30 TAB | Refills: 0 | Status: SHIPPED | OUTPATIENT
Start: 2018-03-06 | End: 2018-03-20

## 2018-02-06 RX ORDER — DEXTROAMPHETAMINE SACCHARATE, AMPHETAMINE ASPARTATE, DEXTROAMPHETAMINE SULFATE AND AMPHETAMINE SULFATE 2.5; 2.5; 2.5; 2.5 MG/1; MG/1; MG/1; MG/1
10 TABLET ORAL DAILY
Qty: 30 TAB | Refills: 0 | Status: SHIPPED | OUTPATIENT
Start: 2018-02-06 | End: 2018-02-06 | Stop reason: SDUPTHER

## 2018-02-06 NOTE — PROGRESS NOTES
Magalis Negro is a 79 y.o. male who presents with the following  Chief Complaint   Patient presents with    Follow-up     memory loss       HPI  Patient comes in with wife for a follow up for memory loss. Currently was denied Namzaric and on Aricept 10 mg and namenda 10 mg BID and doing well with this. Feels his memory is about the same she may feel like the short term has been a little worse. Wife agrees with short term concerns. Will have to repeat herself, ask same questions, etc. Helps with medications. He has trouble getting out what he wants to say but may have gotten somewhat better. He mostly has trouble with short term memory remembering where he puts things. Remembering where in the house things are. Wife can help when he gets to the point where he is getting frustrated. Trying to stay busy mentally and physically but with cold weather has not been able to get out a lot. Still trouble with focusing, day to day activities, and concentrating. No Known Allergies    Current Outpatient Prescriptions   Medication Sig    [START ON 3/6/2018] dextroamphetamine-amphetamine (ADDERALL) 10 mg tablet Take 1 Tab (10 mg total) by mouth dailyEarliest Fill Date: 3/6/18. Max Daily Amount: 10 mg    donepezil (ARICEPT) 10 mg tablet Take 1 Tab by mouth nightly.  memantine (NAMENDA) 10 mg tablet Take 1 Tab by mouth two (2) times a day.  rosuvastatin (CRESTOR) 20 mg tablet Take 1 Tab by mouth daily.  niacin ER (NIASPAN) 500 mg tablet Take 1 Tab by mouth nightly.  metoprolol succinate (TOPROL XL) 50 mg XL tablet Take 1 Tab by mouth daily.  DOCOSAHEXANOIC ACID/EPA (FISH OIL PO) Take 1,200 mg by mouth daily.  PV W-O LAZARUS/FERROUS FUMARATE/FA (M-VIT PO) Take  by mouth.  POTASSIUM PO Take 550 mg by mouth.  OMEGA-3 FATTY ACIDS (OMEGA 3 PO) Take  by mouth.  aspirin (ASPIRIN) 325 mg tablet Take 325 mg by mouth daily. No current facility-administered medications for this visit.         History Smoking Status    Current Every Day Smoker    Packs/day: 1.00   Smokeless Tobacco    Never Used       Past Medical History:   Diagnosis Date    Alcoholism ([facility] [address]     CAD (coronary artery disease) 6/15/2010    DM (diabetes mellitus) ([facility] 6/15/2010    Falls     Heart disease     HTN (hypertension) 6/15/2010    Hypercholesteremia 6/15/2010    PTSD (post-traumatic stress disorder)     Skin cancer 6/15/2010    Smoker 6/15/2010    Snoring        Past Surgical History:   Procedure Laterality Date    CARDIAC SURG PROCEDURE UNLIST      stent       Family History   Problem Relation Age of Onset    Cancer Mother     Heart Disease Other        Social History     Social History    Marital status:      Spouse name: N/A    Number of children: N/A    Years of education: N/A     Social History Main Topics    Smoking status: Current Every Day Smoker     Packs/day: 1.00    Smokeless tobacco: Never Used    Alcohol use 2.4 oz/week     4 Cans of beer per week    Drug use: No    Sexual activity: No     Other Topics Concern    None     Social History Narrative       Review of Systems   Constitutional: Negative for malaise/fatigue. Eyes: Negative for blurred vision, double vision and photophobia. Respiratory: Negative for shortness of breath and wheezing. Gastrointestinal: Negative for vomiting. Neurological: Negative for dizziness, tingling and headaches. Psychiatric/Behavioral: Positive for memory loss. Negative for hallucinations. The patient is not nervous/anxious and does not have insomnia. Remainder of comprehensive review is negative.      Physical Exam :    Visit Vitals    /66    Ht 6' 1\" (1.854 m)    Wt 82.6 kg (182 lb)    BMI 24.01 kg/m2       General: Well defined, nourished, and groomed individual in no acute distress.    Neck: Supple, nontender, no bruits, no pain with resistance to active range of motion.    Heart: Regular rate and rhythm, no murmurs, rub, or gallop. Normal S1S2. Lungs: Clear to auscultation bilaterally with equal chest expansion, no cough, no wheeze  Musculoskeletal: Extremities revealed no edema and had full range of motion of joints.    Psych: Good mood and bright affect    NEUROLOGICAL EXAMINATION:    Mental Status: Alert and oriented to person, place, and time. MMSE 28. No repeat if ands and buts     Cranial Nerves:    II, III, IV, VI: Visual acuity grossly intact. Visual fields are normal.    Pupils are equal, round, and reactive to light and accommodation.    Extra-ocular movements are full and fluid. Fundoscopic exam was benign, no ptosis or nystagmus.    V-XII: Hearing is grossly intact. Facial features are symmetric, with normal sensation and strength. The palate rises symmetrically and the tongue protrudes midline. Sternocleidomastoids 5/5. Motor Examination: Normal tone, bulk, and strength, 5/5 muscle strength throughout. Coordination: Finger to nose was normal. No resting or intention tremor    Gait and Station: Steady while walking. Normal arm swing. No pronator drift. No muscle wasting or fasiculations noted. Reflexes: DTRs 2+ throughout. Results for orders placed or performed in visit on 08/18/17   CBC WITH AUTOMATED DIFF   Result Value Ref Range    WBC 7.3 3.4 - 10.8 x10E3/uL    RBC 4.94 4.14 - 5.80 x10E6/uL    HGB 16.2 12.6 - 17.7 g/dL    HCT 47.4 37.5 - 51.0 %    MCV 96 79 - 97 fL    MCH 32.8 26.6 - 33.0 pg    MCHC 34.2 31.5 - 35.7 g/dL    RDW 13.6 12.3 - 15.4 %    PLATELET 831 986 - 155 x10E3/uL    NEUTROPHILS 51 %    Lymphocytes 38 %    MONOCYTES 7 %    EOSINOPHILS 3 %    BASOPHILS 1 %    ABS. NEUTROPHILS 3.8 1.4 - 7.0 x10E3/uL    Abs Lymphocytes 2.8 0.7 - 3.1 x10E3/uL    ABS. MONOCYTES 0.5 0.1 - 0.9 x10E3/uL    ABS. EOSINOPHILS 0.2 0.0 - 0.4 x10E3/uL    ABS. BASOPHILS 0.0 0.0 - 0.2 x10E3/uL    IMMATURE GRANULOCYTES 0 %    ABS. IMM.  GRANS. 0.0 0.0 - 0.1 L69P7/EW   METABOLIC PANEL, COMPREHENSIVE   Result Value Ref Range    Glucose 126 (H) 65 - 99 mg/dL    BUN 18 8 - 27 mg/dL    Creatinine 0.98 0.76 - 1.27 mg/dL    GFR est non-AA 79 >59 mL/min/1.73    GFR est AA 92 >59 mL/min/1.73    BUN/Creatinine ratio 18 10 - 24    Sodium 139 134 - 144 mmol/L    Potassium 4.7 3.5 - 5.2 mmol/L    Chloride 99 96 - 106 mmol/L    CO2 20 18 - 29 mmol/L    Calcium 10.0 8.6 - 10.2 mg/dL    Protein, total 6.7 6.0 - 8.5 g/dL    Albumin 4.6 3.6 - 4.8 g/dL    GLOBULIN, TOTAL 2.1 1.5 - 4.5 g/dL    A-G Ratio 2.2 1.2 - 2.2    Bilirubin, total 0.3 0.0 - 1.2 mg/dL    Alk. phosphatase 74 39 - 117 IU/L    AST (SGOT) 30 0 - 40 IU/L    ALT (SGPT) 22 0 - 44 IU/L   LIPID PANEL   Result Value Ref Range    Cholesterol, total 151 100 - 199 mg/dL    Triglyceride 99 0 - 149 mg/dL    HDL Cholesterol 53 >39 mg/dL    VLDL, calculated 20 5 - 40 mg/dL    LDL, calculated 78 0 - 99 mg/dL   TSH 3RD GENERATION   Result Value Ref Range    TSH 3.190 0.450 - 4.500 uIU/mL   PSA SCREENING (SCREENING)   Result Value Ref Range    Prostate Specific Ag 3.6 0.0 - 4.0 ng/mL   HEMOGLOBIN A1C WITH EAG   Result Value Ref Range    Hemoglobin A1c 5.9 (H) 4.8 - 5.6 %    Estimated average glucose 123 mg/dL   MICROALBUMIN, UR, RAND W/ MICROALBUMIN/CREA RATIO   Result Value Ref Range    Creatinine, urine 57.6 Not Estab. mg/dL    Microalbumin, urine <3.0 Not Estab. ug/mL    Microalb/Creat ratio (ug/mg creat.) <5.2 0.0 - 30.0 mg/g creat   CVD REPORT   Result Value Ref Range    INTERPRETATION Note    DIABETES PATIENT EDUCATION   Result Value Ref Range    PDF Image Not applicable        Orders Placed This Encounter    DISCONTD: dextroamphetamine-amphetamine (ADDERALL) 10 mg tablet     Sig: Take 1 Tab (10 mg total) by mouth daily. Max Daily Amount: 10 mg     Dispense:  30 Tab     Refill:  0    dextroamphetamine-amphetamine (ADDERALL) 10 mg tablet     Sig: Take 1 Tab (10 mg total) by mouth dailyEarliest Fill Date: 3/6/18. Max Daily Amount: 10 mg     Dispense:  30 Tab     Refill:  0       1. Early onset Alzheimer's dementia without behavioral disturbance        Follow-up Disposition:  Return in about 6 weeks (around 3/20/2018). AD is seemingly stable. Keep with Aricept 10 mg and Namenda 10 mg BID as insurance denied the better choice of Namzaric. Wife is helping with medications. Needs to stay busy on all aspects of mentally and physically active. We will try some low dose adderall 10 mg to see if this can help with his attention and focusing and concentrating. We discussed the side effects and goals of care and he is very interested in trying. Discussed what to look for. Monitor BP. Take in AM. Call with any changes or concerns.          This note will not be viewable in [facility]

## 2018-02-06 NOTE — PATIENT INSTRUCTIONS
[facility]   Statement to Patients  April 1, 2014      In an effort to ensure the large volume of patient prescription refills is processed in the most efficient and expeditious manner, we are asking our patients to assist us by calling your Pharmacy for all prescription refills, this will include also your  Mail Order Pharmacy. The pharmacy will contact our office electronically to continue the refill process. Please do not wait until the last minute to call your pharmacy. We need at least 48 hours (2days) to fill prescriptions. We also encourage you to call your pharmacy before going to  your prescription to make sure it is ready. With regard to controlled substance prescription refill requests (narcotic refills) that need to be picked up at our office, we ask your cooperation by providing us with at least 72 hours (3days) notice that you will need a refill. We will not refill narcotic prescription refill requests after 4:00pm on any weekday, Monday through Thursday, or after 2:00pm on Fridays, or on the weekends. We encourage everyone to explore another way of getting your prescription refill request processed using [employer], our patient web portal through our electronic medical record system. [employer] is an efficient and effective way to communicate your medication request directly to the office and  downloadable as an kevin on your smart phone . [employer] also features a review functionality that allows you to view your medication list as well as leave messages for your physician. Are you ready to get connected? If so please review the attatched instructions or speak to any of our staff to get you set up right away! Thank you so much for your cooperation. Should you have any questions please contact our Practice Administrator.     The Physicians and Staff,  [facility]

## 2018-02-06 NOTE — PROGRESS NOTES
Patient is here for follow up. Memory seems to be okay, but he is still forgetting the small things and short term. Patient forgot how to right his numbers this morning.

## 2018-02-06 NOTE — MR AVS SNAPSHOT
Rodrigues Chata 
 
 
 [facility] [address] [facility] [id number] [phone] Patient: Diamond Fish Sr. MRN: [id number] ABX:[birth year] Visit Information Date & Time Provider Department Dept. Phone Encounter #  
 2/6/2018  9:00 AM Guillermina Mcgowan NP [facility] [phone] [id number] Follow-up Instructions Return in about 6 weeks (around 3/20/2018). Upcoming Health Maintenance Date Due Hepatitis C Screening 1950 DTaP/Tdap/Td series (1 - Tdap) 4/1/1971 ZOSTER VACCINE AGE 60> 2/1/2010 COLONOSCOPY 6/21/2016 FOOT EXAM Q1 11/24/2016 Influenza Age 5 to Adult 8/1/2017 Pneumococcal 65+ High/Highest Risk (2 of 2 - PPSV23) 9/26/2017 HEMOGLOBIN A1C Q6M 2/18/2018 MICROALBUMIN Q1 8/18/2018 LIPID PANEL Q1 8/18/2018 MEDICARE YEARLY EXAM 8/19/2018 EYE EXAM RETINAL OR DILATED Q1 9/22/2018 GLAUCOMA SCREENING Q2Y 9/22/2019 Allergies as of 2/6/2018  Review Complete On: 2/6/2018 By: Olinda Hughes No Known Allergies Current Immunizations  Reviewed on 11/24/2015 Name Date Influenza Vaccine 9/22/2016, 10/5/2014 Influenza Vaccine Split 11/26/2012, 12/20/2011 Influenza Vaccine Whole 12/20/2011 Pneumococcal Conjugate (PCV-13) 8/1/2017, 11/24/2015 ZZZ-RETIRED (DO NOT USE) Pneumococcal Vaccine (Unspecified Type) 12/20/2011, 12/20/2011 Not reviewed this visit You Were Diagnosed With   
  
 Codes Comments Early onset Alzheimer's dementia without behavioral disturbance    -  Primary ICD-10-CM: G30.0, F02.80 ICD-9-CM: 331.0, 294.10 Vitals BP Height(growth percentile) Weight(growth percentile) BMI Smoking Status 104/66 6' 1\" (1.854 m) 182 lb (82.6 kg) 24.01 kg/m2 Current Every Day Smoker BMI and BSA Data Body Mass Index Body Surface Area 24.01 kg/m 2 2.06 m 2 Preferred Pharmacy Pharmacy Name Phone [facility] [address], [address] [place] [address] [phone] Your Updated Medication List  
  
   
This list is accurate as of: 2/6/18  9:31 AM.  Always use your most recent med list.  
  
  
  
  
 aspirin 325 mg tablet Commonly known as:  ASPIRIN Take 325 mg by mouth daily. dextroamphetamine-amphetamine 10 mg tablet Commonly known as:  ADDERALL Take 1 Tab (10 mg total) by mouth dailyEarliest Fill Date: 3/6/18. Max Daily Amount: 10 mg  
Start taking on:  3/6/2018  
  
 donepezil 10 mg tablet Commonly known as:  ARICEPT Take 1 Tab by mouth nightly. FISH OIL PO Take 1,200 mg by mouth daily. M-VIT PO Take  by mouth.  
  
 memantine 10 mg tablet Commonly known as:  Norma Jewel Take 1 Tab by mouth two (2) times a day. metoprolol succinate 50 mg XL tablet Commonly known as:  TOPROL XL Take 1 Tab by mouth daily. niacin  mg tablet Commonly known as:  Niaspan Take 1 Tab by mouth nightly. OMEGA 3 PO Take  by mouth. POTASSIUM PO Take 550 mg by mouth. rosuvastatin 20 mg tablet Commonly known as:  CRESTOR Take 1 Tab by mouth daily. Prescriptions Printed Refills  
 dextroamphetamine-amphetamine (ADDERALL) 10 mg tablet 0 Starting on: 3/6/2018 Sig: Take 1 Tab (10 mg total) by mouth dailyEarliest Fill Date: 3/6/18. Max Daily Amount: 10 mg  
 Class: Print Route: Oral  
  
Follow-up Instructions Return in about 6 weeks (around 3/20/2018). Patient Instructions PRESCRIPTION REFILL POLICY [facility] Statement to Patients April 1, 2014 In an effort to ensure the large volume of patient prescription refills is processed in the most efficient and expeditious manner, we are asking our patients to assist us by calling your Pharmacy for all prescription refills, this will include also your  Mail Order Pharmacy.  The pharmacy will contact our office electronically to continue the refill process. Please do not wait until the last minute to call your pharmacy. We need at least 48 hours (2days) to fill prescriptions. We also encourage you to call your pharmacy before going to  your prescription to make sure it is ready. With regard to controlled substance prescription refill requests (narcotic refills) that need to be picked up at our office, we ask your cooperation by providing us with at least 72 hours (3days) notice that you will need a refill. We will not refill narcotic prescription refill requests after 4:00pm on any weekday, Monday through Thursday, or after 2:00pm on Fridays, or on the weekends. We encourage everyone to explore another way of getting your prescription refill request processed using [employer], our patient web portal through our electronic medical record system. [employer] is an efficient and effective way to communicate your medication request directly to the office and  downloadable as an kevin on your smart phone . [employer] also features a review functionality that allows you to view your medication list as well as leave messages for your physician. Are you ready to get connected? If so please review the attatched instructions or speak to any of our staff to get you set up right away! Thank you so much for your cooperation. Should you have any questions please contact our Practice Administrator. The Physicians and Staff,  [facility] Introducing [facility] SERVICES! [employer] introduces [employer] patient portal. Now you can access parts of your medical record, email your doctor's office, and request medication refills online. 1. In your internet browser, go to [email or web address] 2. Click on the First Time User? Click Here link in the Sign In box. You will see the New Member Sign Up page. 3. Enter your [employer] Access Code exactly as it appears below. You will not need to use this code after youve completed the sign-up process. If you do not sign up before the expiration date, you must request a new code. · [employer] Access Code: [email or web address] Expires: 5/7/2018  9:31 AM 
 
4. Enter the last four digits of your Social Security Number (xxxx) and Date of Birth (mm/dd/yyyy) as indicated and click Submit. You will be taken to the next sign-up page. 5. Create a [employer] ID. This will be your [employer] login ID and cannot be changed, so think of one that is secure and easy to remember. 6. Create a [employer] password. You can change your password at any time. 7. Enter your Password Reset Question and Answer. This can be used at a later time if you forget your password. 8. Enter your e-mail address. You will receive e-mail notification when new information is available in [address] 9. Click Sign Up. You can now view and download portions of your medical record. 10. Click the Download Summary menu link to download a portable copy of your medical information. If you have questions, please visit the Frequently Asked Questions section of the [employer] website. Remember, [employer] is NOT to be used for urgent needs. For medical emergencies, dial 911. Now available from your iPhone and Android! Please provide this summary of care documentation to your next provider. Your primary care clinician is listed as TATIANA BRAUN. If you have any questions after today's visit, please call [phone].

## 2018-03-20 ENCOUNTER — OFFICE VISIT (OUTPATIENT)
Dept: NEUROLOGY | Age: 68
End: 2018-03-20

## 2018-03-20 VITALS
SYSTOLIC BLOOD PRESSURE: 108 MMHG | HEIGHT: 73 IN | DIASTOLIC BLOOD PRESSURE: 66 MMHG | BODY MASS INDEX: 24.65 KG/M2 | WEIGHT: 186 LBS

## 2018-03-20 DIAGNOSIS — F02.80 EARLY ONSET ALZHEIMER'S DEMENTIA WITHOUT BEHAVIORAL DISTURBANCE (HCC): Primary | ICD-10-CM

## 2018-03-20 DIAGNOSIS — G30.0 EARLY ONSET ALZHEIMER'S DEMENTIA WITHOUT BEHAVIORAL DISTURBANCE (HCC): Primary | ICD-10-CM

## 2018-03-20 RX ORDER — DEXTROAMPHETAMINE SACCHARATE, AMPHETAMINE ASPARTATE, DEXTROAMPHETAMINE SULFATE AND AMPHETAMINE SULFATE 5; 5; 5; 5 MG/1; MG/1; MG/1; MG/1
20 TABLET ORAL DAILY
Qty: 30 TAB | Refills: 0 | Status: SHIPPED | OUTPATIENT
Start: 2018-03-20 | End: 2018-03-20 | Stop reason: SDUPTHER

## 2018-03-20 RX ORDER — DEXTROAMPHETAMINE SACCHARATE, AMPHETAMINE ASPARTATE, DEXTROAMPHETAMINE SULFATE AND AMPHETAMINE SULFATE 5; 5; 5; 5 MG/1; MG/1; MG/1; MG/1
20 TABLET ORAL DAILY
Qty: 30 TAB | Refills: 0 | Status: SHIPPED | OUTPATIENT
Start: 2018-04-20 | End: 2018-03-20 | Stop reason: SDUPTHER

## 2018-03-20 RX ORDER — DEXTROAMPHETAMINE SACCHARATE, AMPHETAMINE ASPARTATE, DEXTROAMPHETAMINE SULFATE AND AMPHETAMINE SULFATE 5; 5; 5; 5 MG/1; MG/1; MG/1; MG/1
20 TABLET ORAL DAILY
Qty: 30 TAB | Refills: 0 | Status: SHIPPED | OUTPATIENT
Start: 2018-05-20 | End: 2018-05-22 | Stop reason: SDUPTHER

## 2018-03-20 NOTE — PROGRESS NOTES
Patient is here with his wife for a follow up. Memory has been about the same. He has more good days than bad days. He has been outside working on cars more often. Still makes his wife guess at what he is trying to say when he cannot remember.

## 2018-03-20 NOTE — PROGRESS NOTES
Eleazar Harper is a 79 y.o. male who presents with the following  Chief Complaint   Patient presents with    Follow-up     memory loss       HPI Patient comes in with wife for a follow up for memory loss. Currently was denied Namzaric and on Aricept 10 mg and namenda 10 mg BID and doing well with this. Feels his memory is about the same she may feel like the short term has been a little worse. Wife agrees with short term concerns and has to tell him things multiple times, will forget to take out trash will actually walk over it to do something else. Short term he will get something out of the kitchen and then forget where to put it and will put it back in the wrong place. Wife will have to repeat herself, ask same questions, etc. Helps with medications. He has trouble getting out what he wants to say but may have gotten somewhat better. Working on a [employer] he just purchased and trying to stay busy. Has noticed the cold has caused him to not want to do more. Watching tv during the day. Wife states some trouble with hygiene and showering and changing clothes. Adderall 10 mg has really helped him focus better, concentrate, get things done. He and wife agree. No Known Allergies    Current Outpatient Prescriptions   Medication Sig    [START ON 5/20/2018] dextroamphetamine-amphetamine (ADDERALL) 20 mg tablet Take 1 Tab (20 mg total) by mouth dailyEarliest Fill Date: 5/20/18. Max Daily Amount: 20 mg    donepezil (ARICEPT) 10 mg tablet Take 1 Tab by mouth nightly.  memantine (NAMENDA) 10 mg tablet Take 1 Tab by mouth two (2) times a day.  rosuvastatin (CRESTOR) 20 mg tablet Take 1 Tab by mouth daily.  niacin ER (NIASPAN) 500 mg tablet Take 1 Tab by mouth nightly.  metoprolol succinate (TOPROL XL) 50 mg XL tablet Take 1 Tab by mouth daily.  DOCOSAHEXANOIC ACID/EPA (FISH OIL PO) Take 1,200 mg by mouth daily.  PV W-O LAZARUS/FERROUS FUMARATE/FA (M-VIT PO) Take  by mouth.     POTASSIUM PO Take 550 mg by mouth.  OMEGA-3 FATTY ACIDS (OMEGA 3 PO) Take  by mouth.  aspirin (ASPIRIN) 325 mg tablet Take 325 mg by mouth daily. No current facility-administered medications for this visit. History   Smoking Status    Current Every Day Smoker    Packs/day: 1.00   Smokeless Tobacco    Never Used       Past Medical History:   Diagnosis Date    Alcoholism ([facility] [address] [facility]     CAD (coronary artery disease) 6/15/2010    DM (diabetes mellitus) ([facility] 6/15/2010    Falls     Heart disease     HTN (hypertension) 6/15/2010    Hypercholesteremia 6/15/2010    PTSD (post-traumatic stress disorder)     Skin cancer 6/15/2010    Smoker 6/15/2010    Snoring        Past Surgical History:   Procedure Laterality Date    CARDIAC SURG PROCEDURE UNLIST      stent       Family History   Problem Relation Age of Onset    Cancer Mother     Heart Disease Other        Social History     Social History    Marital status:      Spouse name: N/A    Number of children: N/A    Years of education: N/A     Social History Main Topics    Smoking status: Current Every Day Smoker     Packs/day: 1.00    Smokeless tobacco: Never Used    Alcohol use 2.4 oz/week     4 Cans of beer per week    Drug use: No    Sexual activity: No     Other Topics Concern    None     Social History Narrative       Review of Systems   Eyes: Negative for blurred vision, double vision and photophobia. Respiratory: Negative for shortness of breath and wheezing. Gastrointestinal: Negative for nausea and vomiting. Musculoskeletal: Negative for falls. Neurological: Negative for dizziness, tingling, weakness and headaches. Psychiatric/Behavioral: Positive for memory loss. The patient is not nervous/anxious and does not have insomnia. Remainder of comprehensive review is negative.      Physical Exam :    Visit Vitals    /66    Ht 6' 1\" (1.854 m)    Wt 84.4 kg (186 lb)    BMI 24.54 kg/m2       General: Well defined, nourished, and groomed individual in no acute distress.    Neck: Supple, nontender, no bruits, no pain with resistance to active range of motion.    Heart: Regular rate and rhythm, no murmurs, rub, or gallop. Normal S1S2. Lungs: Clear to auscultation bilaterally with equal chest expansion, no cough, no wheeze  Musculoskeletal: Extremities revealed no edema and had full range of motion of joints.    Psych: Good mood and bright affect    NEUROLOGICAL EXAMINATION:    Mental Status: Alert and oriented to person, place, and not time. mmse 26     Cranial Nerves:    II, III, IV, VI: Visual acuity grossly intact. Visual fields are normal.    Pupils are equal, round, and reactive to light and accommodation.    Extra-ocular movements are full and fluid. Fundoscopic exam was benign, no ptosis or nystagmus.    V-XII: Hearing is grossly intact. Facial features are symmetric, with normal sensation and strength. The palate rises symmetrically and the tongue protrudes midline. Sternocleidomastoids 5/5. Motor Examination: Normal tone, bulk, and strength, 5/5 muscle strength throughout. Coordination: Finger to nose was normal. No resting or intention tremor    Gait and Station: Steady while walking. Normal arm swing. No pronator drift. No muscle wasting or fasiculations noted. Reflexes: DTRs 2+ throughout. Results for orders placed or performed in visit on 08/18/17   CBC WITH AUTOMATED DIFF   Result Value Ref Range    WBC 7.3 3.4 - 10.8 x10E3/uL    RBC 4.94 4.14 - 5.80 x10E6/uL    HGB 16.2 12.6 - 17.7 g/dL    HCT 47.4 37.5 - 51.0 %    MCV 96 79 - 97 fL    MCH 32.8 26.6 - 33.0 pg    MCHC 34.2 31.5 - 35.7 g/dL    RDW 13.6 12.3 - 15.4 %    PLATELET 916 946 - 968 x10E3/uL    NEUTROPHILS 51 %    Lymphocytes 38 %    MONOCYTES 7 %    EOSINOPHILS 3 %    BASOPHILS 1 %    ABS. NEUTROPHILS 3.8 1.4 - 7.0 x10E3/uL    Abs Lymphocytes 2.8 0.7 - 3.1 x10E3/uL    ABS. MONOCYTES 0.5 0.1 - 0.9 x10E3/uL    ABS.  EOSINOPHILS 0.2 0.0 - 0.4 x10E3/uL    ABS. BASOPHILS 0.0 0.0 - 0.2 x10E3/uL    IMMATURE GRANULOCYTES 0 %    ABS. IMM. GRANS. 0.0 0.0 - 0.1 J95D7/OS   METABOLIC PANEL, COMPREHENSIVE   Result Value Ref Range    Glucose 126 (H) 65 - 99 mg/dL    BUN 18 8 - 27 mg/dL    Creatinine 0.98 0.76 - 1.27 mg/dL    GFR est non-AA 79 >59 mL/min/1.73    GFR est AA 92 >59 mL/min/1.73    BUN/Creatinine ratio 18 10 - 24    Sodium 139 134 - 144 mmol/L    Potassium 4.7 3.5 - 5.2 mmol/L    Chloride 99 96 - 106 mmol/L    CO2 20 18 - 29 mmol/L    Calcium 10.0 8.6 - 10.2 mg/dL    Protein, total 6.7 6.0 - 8.5 g/dL    Albumin 4.6 3.6 - 4.8 g/dL    GLOBULIN, TOTAL 2.1 1.5 - 4.5 g/dL    A-G Ratio 2.2 1.2 - 2.2    Bilirubin, total 0.3 0.0 - 1.2 mg/dL    Alk. phosphatase 74 39 - 117 IU/L    AST (SGOT) 30 0 - 40 IU/L    ALT (SGPT) 22 0 - 44 IU/L   LIPID PANEL   Result Value Ref Range    Cholesterol, total 151 100 - 199 mg/dL    Triglyceride 99 0 - 149 mg/dL    HDL Cholesterol 53 >39 mg/dL    VLDL, calculated 20 5 - 40 mg/dL    LDL, calculated 78 0 - 99 mg/dL   TSH 3RD GENERATION   Result Value Ref Range    TSH 3.190 0.450 - 4.500 uIU/mL   PSA SCREENING (SCREENING)   Result Value Ref Range    Prostate Specific Ag 3.6 0.0 - 4.0 ng/mL   HEMOGLOBIN A1C WITH EAG   Result Value Ref Range    Hemoglobin A1c 5.9 (H) 4.8 - 5.6 %    Estimated average glucose 123 mg/dL   MICROALBUMIN, UR, RAND W/ MICROALBUMIN/CREA RATIO   Result Value Ref Range    Creatinine, urine 57.6 Not Estab. mg/dL    Microalbumin, urine <3.0 Not Estab. ug/mL    Microalb/Creat ratio (ug/mg creat.) <5.2 0.0 - 30.0 mg/g creat   CVD REPORT   Result Value Ref Range    INTERPRETATION Note    DIABETES PATIENT EDUCATION   Result Value Ref Range    PDF Image Not applicable        Orders Placed This Encounter    DISCONTD: dextroamphetamine-amphetamine (ADDERALL) 20 mg tablet     Sig: Take 1 Tab (20 mg total) by mouth daily.   Max Daily Amount: 20 mg     Dispense:  30 Tab     Refill:  0    DISCONTD: dextroamphetamine-amphetamine (ADDERALL) 20 mg tablet     Sig: Take 1 Tab (20 mg total) by mouth dailyEarliest Fill Date: 4/20/18. Max Daily Amount: 20 mg     Dispense:  30 Tab     Refill:  0    dextroamphetamine-amphetamine (ADDERALL) 20 mg tablet     Sig: Take 1 Tab (20 mg total) by mouth dailyEarliest Fill Date: 5/20/18. Max Daily Amount: 20 mg     Dispense:  30 Tab     Refill:  0       1. Early onset Alzheimer's dementia without behavioral disturbance        Follow-up Disposition: Not on File        AD. Keep Namenda 10 mg BID and Aricept 10 mg daily. Increase Adderall to 20 mg daily to see if this can help with further clearing up his memory. Remember to stay active mentally and try do more then tv. Abstract thinking. Will also want to make sure he is physically active once it gets nicer. Call with any changes.      This note will not be viewable in 2Checkout[employer]

## 2018-03-20 NOTE — MR AVS SNAPSHOT
[address] [place] [address] [address] [facility] [id number] [phone] Patient: Diamond Fish Sr. MRN: [id number] CGL:[id number] Visit Information Date & Time Provider Department Dept. Phone Encounter #  
 3/20/2018  9:00 AM Guillermina Mcgowan NP [facility] [phone] [id number] Upcoming Health Maintenance Date Due Hepatitis C Screening 1950 DTaP/Tdap/Td series (1 - Tdap) 4/1/1971 ZOSTER VACCINE AGE 60> 2/1/2010 COLONOSCOPY 6/21/2016 FOOT EXAM Q1 11/24/2016 Influenza Age 5 to Adult 8/1/2017 Pneumococcal 65+ High/Highest Risk (2 of 2 - PPSV23) 9/26/2017 HEMOGLOBIN A1C Q6M 2/18/2018 MICROALBUMIN Q1 8/18/2018 LIPID PANEL Q1 8/18/2018 MEDICARE YEARLY EXAM 8/19/2018 EYE EXAM RETINAL OR DILATED Q1 9/22/2018 GLAUCOMA SCREENING Q2Y 9/22/2019 Allergies as of 3/20/2018  Review Complete On: 3/20/2018 By: Olinda Hughes No Known Allergies Current Immunizations  Reviewed on 11/24/2015 Name Date Influenza Vaccine 9/22/2016, 10/5/2014 Influenza Vaccine Split 11/26/2012, 12/20/2011 Influenza Vaccine Whole 12/20/2011 Pneumococcal Conjugate (PCV-13) 8/1/2017, 11/24/2015 ZZZ-RETIRED (DO NOT USE) Pneumococcal Vaccine (Unspecified Type) 12/20/2011, 12/20/2011 Not reviewed this visit You Were Diagnosed With   
  
 Codes Comments Early onset Alzheimer's dementia without behavioral disturbance    -  Primary ICD-10-CM: G30.0, F02.80 ICD-9-CM: 331.0, 294.10 Vitals BP Height(growth percentile) Weight(growth percentile) BMI Smoking Status 108/66 6' 1\" (1.854 m) 186 lb (84.4 kg) 24.54 kg/m2 Current Every Day Smoker BMI and BSA Data Body Mass Index Body Surface Area 24.54 kg/m 2 2.08 m 2 Preferred Pharmacy Pharmacy Name Phone  [address], [address] [facility] [place] [phone] Your Updated Medication List  
  
   
This list is accurate as of 3/20/18  9:40 AM.  Always use your most recent med list.  
  
  
  
  
 aspirin 325 mg tablet Commonly known as:  ASPIRIN Take 325 mg by mouth daily. dextroamphetamine-amphetamine 20 mg tablet Commonly known as:  ADDERALL Take 1 Tab (20 mg total) by mouth dailyEarliest Fill Date: 5/20/18. Max Daily Amount: 20 mg  
Start taking on:  5/20/2018  
  
 donepezil 10 mg tablet Commonly known as:  ARICEPT Take 1 Tab by mouth nightly. FISH OIL PO Take 1,200 mg by mouth daily. M-VIT PO Take  by mouth.  
  
 memantine 10 mg tablet Commonly known as:  Cori Nancy Take 1 Tab by mouth two (2) times a day. metoprolol succinate 50 mg XL tablet Commonly known as:  TOPROL XL Take 1 Tab by mouth daily. niacin  mg tablet Commonly known as:  Niaspan Take 1 Tab by mouth nightly. OMEGA 3 PO Take  by mouth. POTASSIUM PO Take 550 mg by mouth. rosuvastatin 20 mg tablet Commonly known as:  CRESTOR Take 1 Tab by mouth daily. Prescriptions Printed Refills  
 dextroamphetamine-amphetamine (ADDERALL) 20 mg tablet 0 Starting on: 5/20/2018 Sig: Take 1 Tab (20 mg total) by mouth dailyEarliest Fill Date: 5/20/18. Max Daily Amount: 20 mg  
 Class: Print Route: Oral  
  
Patient Instructions PRESCRIPTION REFILL POLICY [facility] Statement to Patients April 1, 2014 In an effort to ensure the large volume of patient prescription refills is processed in the most efficient and expeditious manner, we are asking our patients to assist us by calling your Pharmacy for all prescription refills, this will include also your  Mail Order Pharmacy. The pharmacy will contact our office electronically to continue the refill process. Please do not wait until the last minute to call your pharmacy.  We need at least 48 hours (2days) to fill prescriptions. We also encourage you to call your pharmacy before going to  your prescription to make sure it is ready. With regard to controlled substance prescription refill requests (narcotic refills) that need to be picked up at our office, we ask your cooperation by providing us with at least 72 hours (3days) notice that you will need a refill. We will not refill narcotic prescription refill requests after 4:00pm on any weekday, Monday through Thursday, or after 2:00pm on Fridays, or on the weekends. We encourage everyone to explore another way of getting your prescription refill request processed using Veenome, our patient web portal through our electronic medical record system. Veenome is an efficient and effective way to communicate your medication request directly to the office and  downloadable as an kevin on your smart phone . Veenome also features a review functionality that allows you to view your medication list as well as leave messages for your physician. Are you ready to get connected? If so please review the attatched instructions or speak to any of our staff to get you set up right away! Thank you so much for your cooperation. Should you have any questions please contact our Practice Administrator. The Physicians and Staff,  [facility] Introducing [facility]! [facility] introduces Veenome patient portal. Now you can access parts of your medical record, email your doctor's office, and request medication refills online. 1. In your internet browser, go to [email or web address] 2. Click on the First Time User? Click Here link in the Sign In box. You will see the New Member Sign Up page. 3. Enter your Veenome Access Code exactly as it appears below. You will not need to use this code after youve completed the sign-up process.  If you do not sign up before the expiration date, you must request a new code. · [employer] Access Code: [email or web address] Expires: 5/7/2018 10:31 AM 
 
4. Enter the last four digits of your Social Security Number (xxxx) and Date of Birth (mm/dd/yyyy) as indicated and click Submit. You will be taken to the next sign-up page. 5. Create a Selectron ID. This will be your Selectron login ID and cannot be changed, so think of one that is secure and easy to remember. 6. Create a Selectron password. You can change your password at any time. 7. Enter your Password Reset Question and Answer. This can be used at a later time if you forget your password. 8. Enter your e-mail address. You will receive e-mail notification when new information is available in [address] 9. Click Sign Up. You can now view and download portions of your medical record. 10. Click the Download Summary menu link to download a portable copy of your medical information. If you have questions, please visit the Frequently Asked Questions section of the [employer] website. Remember, [employer] is NOT to be used for urgent needs. For medical emergencies, dial 911. Now available from your iPhone and Android! Please provide this summary of care documentation to your next provider. Your primary care clinician is listed as TATIANA BRAUN. If you have any questions after today's visit, please call [phone].

## 2018-03-20 NOTE — PATIENT INSTRUCTIONS
[facility]   Statement to Patients  April 1, 2014      In an effort to ensure the large volume of patient prescription refills is processed in the most efficient and expeditious manner, we are asking our patients to assist us by calling your Pharmacy for all prescription refills, this will include also your  Mail Order Pharmacy. The pharmacy will contact our office electronically to continue the refill process. Please do not wait until the last minute to call your pharmacy. We need at least 48 hours (2days) to fill prescriptions. We also encourage you to call your pharmacy before going to  your prescription to make sure it is ready. With regard to controlled substance prescription refill requests (narcotic refills) that need to be picked up at our office, we ask your cooperation by providing us with at least 72 hours (3days) notice that you will need a refill. We will not refill narcotic prescription refill requests after 4:00pm on any weekday, Monday through Thursday, or after 2:00pm on Fridays, or on the weekends. We encourage everyone to explore another way of getting your prescription refill request processed using PubMatic, our patient web portal through our electronic medical record system. PubMatic is an efficient and effective way to communicate your medication request directly to the office and  downloadable as an kevin on your smart phone . [employer]Matic also features a review functionality that allows you to view your medication list as well as leave messages for your physician. Are you ready to get connected? If so please review the attatched instructions or speak to any of our staff to get you set up right away! Thank you so much for your cooperation. Should you have any questions please contact our Practice Administrator.     The Physicians and Staff,  [facility]

## 2018-04-24 DIAGNOSIS — F03.90 DEMENTIA WITHOUT BEHAVIORAL DISTURBANCE, UNSPECIFIED DEMENTIA TYPE: ICD-10-CM

## 2018-04-24 RX ORDER — DONEPEZIL HYDROCHLORIDE 10 MG/1
TABLET, FILM COATED ORAL
Qty: 30 TAB | Refills: 0 | Status: SHIPPED | OUTPATIENT
Start: 2018-04-24 | End: 2018-05-22 | Stop reason: SDUPTHER

## 2018-04-24 RX ORDER — MEMANTINE HYDROCHLORIDE 10 MG/1
TABLET ORAL
Qty: 60 TAB | Refills: 0 | Status: SHIPPED | OUTPATIENT
Start: 2018-04-24 | End: 2018-05-22 | Stop reason: SDUPTHER

## 2018-05-22 ENCOUNTER — OFFICE VISIT (OUTPATIENT)
Dept: NEUROLOGY | Age: 68
End: 2018-05-22

## 2018-05-22 VITALS
DIASTOLIC BLOOD PRESSURE: 58 MMHG | HEIGHT: 73 IN | WEIGHT: 178 LBS | BODY MASS INDEX: 23.59 KG/M2 | SYSTOLIC BLOOD PRESSURE: 104 MMHG

## 2018-05-22 DIAGNOSIS — G30.0 EARLY ONSET ALZHEIMER'S DEMENTIA WITHOUT BEHAVIORAL DISTURBANCE (HCC): ICD-10-CM

## 2018-05-22 DIAGNOSIS — F02.80 EARLY ONSET ALZHEIMER'S DEMENTIA WITHOUT BEHAVIORAL DISTURBANCE (HCC): ICD-10-CM

## 2018-05-22 DIAGNOSIS — F03.90 DEMENTIA WITHOUT BEHAVIORAL DISTURBANCE, UNSPECIFIED DEMENTIA TYPE: ICD-10-CM

## 2018-05-22 RX ORDER — DEXTROAMPHETAMINE SACCHARATE, AMPHETAMINE ASPARTATE, DEXTROAMPHETAMINE SULFATE AND AMPHETAMINE SULFATE 5; 5; 5; 5 MG/1; MG/1; MG/1; MG/1
20 TABLET ORAL DAILY
Qty: 30 TAB | Refills: 0 | Status: SHIPPED | OUTPATIENT
Start: 2018-06-22 | End: 2018-05-22 | Stop reason: SDUPTHER

## 2018-05-22 RX ORDER — MEMANTINE HYDROCHLORIDE 10 MG/1
TABLET ORAL
Qty: 60 TAB | Refills: 5 | Status: SHIPPED | OUTPATIENT
Start: 2018-05-22 | End: 2018-07-23 | Stop reason: SDUPTHER

## 2018-05-22 RX ORDER — DEXTROAMPHETAMINE SACCHARATE, AMPHETAMINE ASPARTATE, DEXTROAMPHETAMINE SULFATE AND AMPHETAMINE SULFATE 5; 5; 5; 5 MG/1; MG/1; MG/1; MG/1
20 TABLET ORAL DAILY
Qty: 30 TAB | Refills: 0 | Status: SHIPPED | OUTPATIENT
Start: 2018-07-22 | End: 2018-08-21 | Stop reason: SDUPTHER

## 2018-05-22 RX ORDER — DONEPEZIL HYDROCHLORIDE 10 MG/1
TABLET, FILM COATED ORAL
Qty: 30 TAB | Refills: 5 | Status: SHIPPED | OUTPATIENT
Start: 2018-05-22 | End: 2018-07-23 | Stop reason: ALTCHOICE

## 2018-05-22 RX ORDER — DEXTROAMPHETAMINE SACCHARATE, AMPHETAMINE ASPARTATE, DEXTROAMPHETAMINE SULFATE AND AMPHETAMINE SULFATE 5; 5; 5; 5 MG/1; MG/1; MG/1; MG/1
20 TABLET ORAL DAILY
Qty: 30 TAB | Refills: 0 | Status: SHIPPED | OUTPATIENT
Start: 2018-05-22 | End: 2018-05-22 | Stop reason: SDUPTHER

## 2018-05-22 NOTE — PROGRESS NOTES
Wendy Gardner is a 76 y.o. male who presents with the following  Chief Complaint   Patient presents with    Follow-up       HPI Patient comes in with wife for a follow up for memory loss. Currently  on Aricept 10 mg and namenda 10 mg BID and doing well with this. Feels his memory is about the same she may feel like the short term has been a little worse since last visit. She has to tell him things multiple times, will forget to take out trash will actually walk over it to do something else. Short term he will get something out of the kitchen and then forget where to put it and will put it back in the wrong place. Wife will have to repeat herself, ask same questions, etc. She does help him remember the medications day to day when he forgets. Watching tv during the day also. Adderall 20 mg has really helped him focus better, concentrate, get things done. He and wife agree. Watching weight. Not as hungry as normal and is looking to eat more day to day. No Known Allergies    Current Outpatient Prescriptions   Medication Sig    memantine (NAMENDA) 10 mg tablet TAKE 1 TABLET BY MOUTH TWICE DAILY    donepezil (ARICEPT) 10 mg tablet TAKE 1 TABLET BY MOUTH EVERY NIGHT    [START ON 7/22/2018] dextroamphetamine-amphetamine (ADDERALL) 20 mg tablet Take 1 Tab (20 mg total) by mouth dailyEarliest Fill Date: 7/22/18. Max Daily Amount: 20 mg    rosuvastatin (CRESTOR) 20 mg tablet Take 1 Tab by mouth daily.  niacin ER (NIASPAN) 500 mg tablet Take 1 Tab by mouth nightly.  metoprolol succinate (TOPROL XL) 50 mg XL tablet Take 1 Tab by mouth daily.  DOCOSAHEXANOIC ACID/EPA (FISH OIL PO) Take 1,200 mg by mouth daily.  PV W-O LAZARUS/FERROUS FUMARATE/FA (M-VIT PO) Take  by mouth.  POTASSIUM PO Take 550 mg by mouth.  OMEGA-3 FATTY ACIDS (OMEGA 3 PO) Take  by mouth.  aspirin (ASPIRIN) 325 mg tablet Take 325 mg by mouth daily. No current facility-administered medications for this visit.         History Smoking Status    Current Every Day Smoker    Packs/day: 1.00   Smokeless Tobacco    Never Used       Past Medical History:   Diagnosis Date    Alcoholism ([facility] [address]     CAD (coronary artery disease) 6/15/2010    DM (diabetes mellitus) ([email or web address] 6/15/2010    Falls     Heart disease     HTN (hypertension) 6/15/2010    Hypercholesteremia 6/15/2010    PTSD (post-traumatic stress disorder)     Skin cancer 6/15/2010    Smoker 6/15/2010    Snoring        Past Surgical History:   Procedure Laterality Date    CARDIAC SURG PROCEDURE UNLIST      stent       Family History   Problem Relation Age of Onset    Cancer Mother     Heart Disease Other        Social History     Social History    Marital status:      Spouse name: N/A    Number of children: N/A    Years of education: N/A     Social History Main Topics    Smoking status: Current Every Day Smoker     Packs/day: 1.00    Smokeless tobacco: Never Used    Alcohol use 2.4 oz/week     4 Cans of beer per week    Drug use: No    Sexual activity: No     Other Topics Concern    None     Social History Narrative       Review of Systems   Eyes: Negative for blurred vision, double vision and photophobia. Respiratory: Negative for shortness of breath and wheezing. Cardiovascular: Negative for chest pain and palpitations. Gastrointestinal: Negative for nausea and vomiting. Neurological: Negative for dizziness, tingling, seizures, loss of consciousness, weakness and headaches. Psychiatric/Behavioral: Positive for memory loss. Negative for hallucinations. The patient is not nervous/anxious and does not have insomnia. Remainder of comprehensive review is negative.      Physical Exam :    Visit Vitals    /58    Ht 6' 1\" (1.854 m)    Wt 80.7 kg (178 lb)    BMI 23.48 kg/m2       General: Well defined, nourished, and groomed individual in no acute distress.    Neck: Supple, nontender, no bruits, no pain with resistance to active range of motion.    Heart: Regular rate and rhythm, no murmurs, rub, or gallop. Normal S1S2. Musculoskeletal: Extremities revealed no edema and had full range of motion of joints.    Psych: Good mood and bright affect    NEUROLOGICAL EXAMINATION:    Mental Status: Alert and oriented to person, place, and time. mmse 25    Cranial Nerves:    II, III, IV, VI: Visual acuity grossly intact. Visual fields are normal.    Pupils are equal, round, and reactive to light and accommodation.    Extra-ocular movements are full and fluid. Fundoscopic exam was benign, no ptosis or nystagmus.    V-XII: Hearing is grossly intact. Facial features are symmetric, with normal sensation and strength. The palate rises symmetrically and the tongue protrudes midline. Sternocleidomastoids 5/5. Motor Examination: Normal tone, bulk, and strength, 5/5 muscle strength throughout. Coordination: Finger to nose was normal. No resting or intention tremor    Gait and Station: Steady while walking. Normal arm swing. No pronator drift. No muscle wasting or fasiculations noted. Reflexes: DTRs 2+ throughout. Results for orders placed or performed in visit on 08/18/17   CBC WITH AUTOMATED DIFF   Result Value Ref Range    WBC 7.3 3.4 - 10.8 x10E3/uL    RBC 4.94 4.14 - 5.80 x10E6/uL    HGB 16.2 12.6 - 17.7 g/dL    HCT 47.4 37.5 - 51.0 %    MCV 96 79 - 97 fL    MCH 32.8 26.6 - 33.0 pg    MCHC 34.2 31.5 - 35.7 g/dL    RDW 13.6 12.3 - 15.4 %    PLATELET 558 746 - 682 x10E3/uL    NEUTROPHILS 51 %    Lymphocytes 38 %    MONOCYTES 7 %    EOSINOPHILS 3 %    BASOPHILS 1 %    ABS. NEUTROPHILS 3.8 1.4 - 7.0 x10E3/uL    Abs Lymphocytes 2.8 0.7 - 3.1 x10E3/uL    ABS. MONOCYTES 0.5 0.1 - 0.9 x10E3/uL    ABS. EOSINOPHILS 0.2 0.0 - 0.4 x10E3/uL    ABS. BASOPHILS 0.0 0.0 - 0.2 x10E3/uL    IMMATURE GRANULOCYTES 0 %    ABS. IMM.  GRANS. 0.0 0.0 - 0.1 D41U1/OP   METABOLIC PANEL, COMPREHENSIVE   Result Value Ref Range    Glucose 126 (H) 65 - 99 mg/dL    BUN 18 8 - 27 mg/dL    Creatinine 0.98 0.76 - 1.27 mg/dL    GFR est non-AA 79 >59 mL/min/1.73    GFR est AA 92 >59 mL/min/1.73    BUN/Creatinine ratio 18 10 - 24    Sodium 139 134 - 144 mmol/L    Potassium 4.7 3.5 - 5.2 mmol/L    Chloride 99 96 - 106 mmol/L    CO2 20 18 - 29 mmol/L    Calcium 10.0 8.6 - 10.2 mg/dL    Protein, total 6.7 6.0 - 8.5 g/dL    Albumin 4.6 3.6 - 4.8 g/dL    GLOBULIN, TOTAL 2.1 1.5 - 4.5 g/dL    A-G Ratio 2.2 1.2 - 2.2    Bilirubin, total 0.3 0.0 - 1.2 mg/dL    Alk. phosphatase 74 39 - 117 IU/L    AST (SGOT) 30 0 - 40 IU/L    ALT (SGPT) 22 0 - 44 IU/L   LIPID PANEL   Result Value Ref Range    Cholesterol, total 151 100 - 199 mg/dL    Triglyceride 99 0 - 149 mg/dL    HDL Cholesterol 53 >39 mg/dL    VLDL, calculated 20 5 - 40 mg/dL    LDL, calculated 78 0 - 99 mg/dL   TSH 3RD GENERATION   Result Value Ref Range    TSH 3.190 0.450 - 4.500 uIU/mL   PSA SCREENING (SCREENING)   Result Value Ref Range    Prostate Specific Ag 3.6 0.0 - 4.0 ng/mL   HEMOGLOBIN A1C WITH EAG   Result Value Ref Range    Hemoglobin A1c 5.9 (H) 4.8 - 5.6 %    Estimated average glucose 123 mg/dL   MICROALBUMIN, UR, RAND W/ MICROALBUMIN/CREA RATIO   Result Value Ref Range    Creatinine, urine 57.6 Not Estab. mg/dL    Microalbumin, urine <3.0 Not Estab. ug/mL    Microalb/Creat ratio (ug/mg creat.) <5.2 0.0 - 30.0 mg/g creat   CVD REPORT   Result Value Ref Range    INTERPRETATION Note    DIABETES PATIENT EDUCATION   Result Value Ref Range    PDF Image Not applicable        Orders Placed This Encounter    memantine (NAMENDA) 10 mg tablet     Sig: TAKE 1 TABLET BY MOUTH TWICE DAILY     Dispense:  60 Tab     Refill:  5    donepezil (ARICEPT) 10 mg tablet     Sig: TAKE 1 TABLET BY MOUTH EVERY NIGHT     Dispense:  30 Tab     Refill:  5    DISCONTD: dextroamphetamine-amphetamine (ADDERALL) 20 mg tablet     Sig: Take 1 Tab (20 mg total) by mouth daily.   Max Daily Amount: 20 mg     Dispense:  30 Tab     Refill:  0    DISCONTD: dextroamphetamine-amphetamine (ADDERALL) 20 mg tablet     Sig: Take 1 Tab (20 mg total) by mouth dailyEarliest Fill Date: 6/22/18. Max Daily Amount: 20 mg     Dispense:  30 Tab     Refill:  0    dextroamphetamine-amphetamine (ADDERALL) 20 mg tablet     Sig: Take 1 Tab (20 mg total) by mouth dailyEarliest Fill Date: 7/22/18. Max Daily Amount: 20 mg     Dispense:  30 Tab     Refill:  0       1. Dementia without behavioral disturbance, unspecified dementia type    2. Early onset Alzheimer's dementia without behavioral disturbance        Follow-up Disposition:  Return in about 3 months (around 8/22/2018). Early onset AD. Keep Aricept 10 mg and Namenda 10 mg BID for further memory preservation. Remember to stay active mentally and physically as this helps with memory loss. Try to increase caloric intake day to day in form of high protein snacks, ensure, instant breakfast, small frequent snacks or meals. Monitor appetite and consider decreasing Adderall if needed but this is working well to help him concentrate, focus, get things done day to day. Call with any changes.          This note will not be viewable in [facility]

## 2018-05-22 NOTE — PROGRESS NOTES
Patient has been more active. Short term memory is still giving him trouble. important things he remembers, but other things he forgets. adderall is helping him .

## 2018-05-22 NOTE — PATIENT INSTRUCTIONS
[facility]   Statement to Patients  April 1, 2014      In an effort to ensure the large volume of patient prescription refills is processed in the most efficient and expeditious manner, we are asking our patients to assist us by calling your Pharmacy for all prescription refills, this will include also your  Mail Order Pharmacy. The pharmacy will contact our office electronically to continue the refill process. Please do not wait until the last minute to call your pharmacy. We need at least 48 hours (2days) to fill prescriptions. We also encourage you to call your pharmacy before going to  your prescription to make sure it is ready. With regard to controlled substance prescription refill requests (narcotic refills) that need to be picked up at our office, we ask your cooperation by providing us with at least 72 hours (3days) notice that you will need a refill. We will not refill narcotic prescription refill requests after 4:00pm on any weekday, Monday through Thursday, or after 2:00pm on Fridays, or on the weekends. We encourage everyone to explore another way of getting your prescription refill request processed using [employer], our patient web portal through our electronic medical record system. [employer] is an efficient and effective way to communicate your medication request directly to the office and  downloadable as an kevin on your smart phone . [employer] also features a review functionality that allows you to view your medication list as well as leave messages for your physician. Are you ready to get connected? If so please review the attatched instructions or speak to any of our staff to get you set up right away! Thank you so much for your cooperation. Should you have any questions please contact our Practice Administrator.     The Physicians and Staff,  [facility]

## 2018-05-22 NOTE — MR AVS SNAPSHOT
[address] [place] [address] [address] [place] [id number] [phone] Patient: Joleen Monroe Sr. MRN: [id number] [facility]:[birth year] Visit Information Date & Time Provider Department Dept. Phone Encounter #  
 5/22/2018  9:00 AM Sarah West NP [facility] [phone] [id number] Follow-up Instructions Return in about 3 months (around 8/22/2018). Upcoming Health Maintenance Date Due Hepatitis C Screening 1950 DTaP/Tdap/Td series (1 - Tdap) 4/1/1971 ZOSTER VACCINE AGE 60> 2/1/2010 COLONOSCOPY 6/21/2016 FOOT EXAM Q1 11/24/2016 Pneumococcal 65+ High/Highest Risk (2 of 2 - PPSV23) 9/26/2017 HEMOGLOBIN A1C Q6M 2/18/2018 Influenza Age 5 to Adult 8/1/2018 MICROALBUMIN Q1 8/18/2018 LIPID PANEL Q1 8/18/2018 MEDICARE YEARLY EXAM 8/19/2018 EYE EXAM RETINAL OR DILATED Q1 9/22/2018 GLAUCOMA SCREENING Q2Y 9/22/2019 Allergies as of 5/22/2018  Review Complete On: 5/22/2018 By: Leona Taylor No Known Allergies Current Immunizations  Reviewed on 11/24/2015 Name Date Influenza Vaccine 9/22/2016, 10/5/2014 Influenza Vaccine Split 11/26/2012, 12/20/2011 Influenza Vaccine Whole 12/20/2011 Pneumococcal Conjugate (PCV-13) 8/1/2017, 11/24/2015 ZZZ-RETIRED (DO NOT USE) Pneumococcal Vaccine (Unspecified Type) 12/20/2011, 12/20/2011 Not reviewed this visit You Were Diagnosed With   
  
 Codes Comments Dementia without behavioral disturbance, unspecified dementia type     ICD-10-CM: F03.90 ICD-9-CM: 294.20 Early onset Alzheimer's dementia without behavioral disturbance     ICD-10-CM: G30.0, F02.80 ICD-9-CM: 331.0, 294.10 Vitals BP Height(growth percentile) Weight(growth percentile) BMI Smoking Status 104/58 6' 1\" (1.854 m) 178 lb (80.7 kg) 23.48 kg/m2 Current Every Day Smoker BMI and BSA Data Body Mass Index Body Surface Area  
 23.48 kg/m 2 2.04 m 2 Preferred Pharmacy Pharmacy Name Phone Wilda Narvaez [address], [address] [facility] [phone] Your Updated Medication List  
  
   
This list is accurate as of 5/22/18  9:43 AM.  Always use your most recent med list.  
  
  
  
  
 aspirin 325 mg tablet Commonly known as:  ASPIRIN Take 325 mg by mouth daily. dextroamphetamine-amphetamine 20 mg tablet Commonly known as:  ADDERALL Take 1 Tab (20 mg total) by mouth dailyEarliest Fill Date: 7/22/18. Max Daily Amount: 20 mg  
Start taking on:  7/22/2018  
  
 donepezil 10 mg tablet Commonly known as:  ARICEPT  
TAKE 1 TABLET BY MOUTH EVERY NIGHT  
  
 FISH OIL PO Take 1,200 mg by mouth daily. M-VIT PO Take  by mouth.  
  
 memantine 10 mg tablet Commonly known as:  Kaden Bicker TAKE 1 TABLET BY MOUTH TWICE DAILY  
  
 metoprolol succinate 50 mg XL tablet Commonly known as:  TOPROL XL Take 1 Tab by mouth daily. niacin  mg tablet Commonly known as:  Niaspan Take 1 Tab by mouth nightly. OMEGA 3 PO Take  by mouth. POTASSIUM PO Take 550 mg by mouth. rosuvastatin 20 mg tablet Commonly known as:  CRESTOR Take 1 Tab by mouth daily. Prescriptions Printed Refills  
 dextroamphetamine-amphetamine (ADDERALL) 20 mg tablet 0 Starting on: 7/22/2018 Sig: Take 1 Tab (20 mg total) by mouth dailyEarliest Fill Date: 7/22/18. Max Daily Amount: 20 mg  
 Class: Print Route: Oral  
  
Prescriptions Sent to Pharmacy Refills  
 memantine (NAMENDA) 10 mg tablet 5 Sig: TAKE 1 TABLET BY MOUTH TWICE DAILY Class: Normal  
 Pharmacy: [facility] [address] [facility] [address] AT [facility] #: [phone]  
 donepezil (ARICEPT) 10 mg tablet 5 Sig: TAKE 1 TABLET BY MOUTH EVERY NIGHT  Class: Normal  
 Pharmacy: [facility] [address] #: [phone] Follow-up Instructions Return in about 3 months (around 8/22/2018). Patient Instructions PRESCRIPTION REFILL POLICY [facility] Statement to Patients April 1, 2014 In an effort to ensure the large volume of patient prescription refills is processed in the most efficient and expeditious manner, we are asking our patients to assist us by calling your Pharmacy for all prescription refills, this will include also your  Mail Order Pharmacy. The pharmacy will contact our office electronically to continue the refill process. Please do not wait until the last minute to call your pharmacy. We need at least 48 hours (2days) to fill prescriptions. We also encourage you to call your pharmacy before going to  your prescription to make sure it is ready. With regard to controlled substance prescription refill requests (narcotic refills) that need to be picked up at our office, we ask your cooperation by providing us with at least 72 hours (3days) notice that you will need a refill. We will not refill narcotic prescription refill requests after 4:00pm on any weekday, Monday through Thursday, or after 2:00pm on Fridays, or on the weekends. We encourage everyone to explore another way of getting your prescription refill request processed using [employer], our patient web portal through our electronic medical record system. [employer] is an efficient and effective way to communicate your medication request directly to the office and  downloadable as an kevin on your smart phone . [employer] also features a review functionality that allows you to view your medication list as well as leave messages for your physician. Are you ready to get connected? If so please review the attatched instructions or speak to any of our staff to get you set up right away! Thank you so much for your cooperation. Should you have any questions please contact our Practice Administrator. The Physicians and Staff,  [facility] Introducing [facility] [place] [facility]! [employer] introduces [employer] patient portal. Now you can access parts of your medical record, email your doctor's office, and request medication refills online. 1. In your internet browser, go to [email or web address] 2. Click on the First Time User? Click Here link in the Sign In box. You will see the New Member Sign Up page. 3. Enter your [employer] Access Code exactly as it appears below. You will not need to use this code after youve completed the sign-up process. If you do not sign up before the expiration date, you must request a new code. · [employer] Access Code: [email or web address] Expires: 8/20/2018  9:43 AM 
 
4. Enter the last four digits of your Social Security Number (xxxx) and Date of Birth (mm/dd/yyyy) as indicated and click Submit. You will be taken to the next sign-up page. 5. Create a alooma ID. This will be your [employer]oma login ID and cannot be changed, so think of one that is secure and easy to remember. 6. Create a [employer] password. You can change your password at any time. 7. Enter your Password Reset Question and Answer. This can be used at a later time if you forget your password. 8. Enter your e-mail address. You will receive e-mail notification when new information is available in [address]. 9. Click Sign Up. You can now view and download portions of your medical record. 10. Click the Download Summary menu link to download a portable copy of your medical information. If you have questions, please visit the Frequently Asked Questions section of the [employer] website. Remember, [employer] is NOT to be used for urgent needs. For medical emergencies, dial 911. Now available from your iPhone and Android! Please provide this summary of care documentation to your next provider. Your primary care clinician is listed as TATIANA BRAUN. If you have any questions after today's visit, please call [phone].

## 2018-07-23 ENCOUNTER — OFFICE VISIT (OUTPATIENT)
Dept: FAMILY MEDICINE CLINIC | Age: 68
End: 2018-07-23

## 2018-07-23 VITALS
BODY MASS INDEX: 23.33 KG/M2 | RESPIRATION RATE: 16 BRPM | HEART RATE: 55 BPM | TEMPERATURE: 97.8 F | WEIGHT: 176 LBS | HEIGHT: 73 IN | DIASTOLIC BLOOD PRESSURE: 78 MMHG | SYSTOLIC BLOOD PRESSURE: 125 MMHG | OXYGEN SATURATION: 99 %

## 2018-07-23 DIAGNOSIS — R60.9 EDEMA, UNSPECIFIED TYPE: ICD-10-CM

## 2018-07-23 DIAGNOSIS — I10 ESSENTIAL HYPERTENSION: Primary | ICD-10-CM

## 2018-07-23 RX ORDER — FUROSEMIDE 20 MG/1
20 TABLET ORAL DAILY
Qty: 30 TAB | Refills: 5 | Status: SHIPPED | OUTPATIENT
Start: 2018-07-23 | End: 2018-12-18 | Stop reason: SDUPTHER

## 2018-07-23 NOTE — MR AVS SNAPSHOT
[address] [address] 
[phone] Patient: Trisha Macias Sr. MRN: [id number] NHJ:5[birth year] Visit Information Date & Time Provider Department Dept. Phone Encounter #  
 7/23/2018  8:30 AM Nikki Cardona NP [facility] [phone] [id number] Follow-up Instructions Return in about 4 weeks (around 8/20/2018) for [facility] and labs/bp check after 8/18/18. Your Appointments 8/21/2018  9:00 AM  
Any with Kathleen Wood NP [facility] ([facility]) Appt Note: 3 month f/u memory leathw [place] [address] [facility] 99 [phone]  
  
   
 [place] [address] [address] [address] [place] Upcoming Health Maintenance Date Due Hepatitis C Screening 1950 DTaP/Tdap/Td series (1 - Tdap) 4/1/1971 ZOSTER VACCINE AGE 60> 2/1/2010 COLONOSCOPY 6/21/2016 FOOT EXAM Q1 11/24/2016 Pneumococcal 65+ High/Highest Risk (2 of 2 - PPSV23) 9/26/2017 HEMOGLOBIN A1C Q6M 2/18/2018 MICROALBUMIN Q1 8/18/2018 LIPID PANEL Q1 8/18/2018 Influenza Age 5 to Adult 8/1/2018 MEDICARE YEARLY EXAM 8/19/2018 EYE EXAM RETINAL OR DILATED Q1 9/22/2018 GLAUCOMA SCREENING Q2Y 9/22/2019 Allergies as of 7/23/2018  Review Complete On: 7/23/2018 By: Nikki Cardona NP No Known Allergies Current Immunizations  Reviewed on 11/24/2015 Name Date Influenza Vaccine 9/22/2016, 10/5/2014 Influenza Vaccine Split 11/26/2012, 12/20/2011 Influenza Vaccine Whole 12/20/2011 Pneumococcal Conjugate (PCV-13) 8/1/2017, 11/24/2015 ZZZ-RETIRED (DO NOT USE) Pneumococcal Vaccine (Unspecified Type) 12/20/2011, 12/20/2011 Not reviewed this visit You Were Diagnosed With   
  
 Codes Comments Essential hypertension    -  Primary ICD-10-CM: I10 
ICD-9-CM: 401.9 Edema, unspecified type     ICD-10-CM: R60.9 ICD-9-CM: 429. 3 Vitals BP Pulse Temp Resp Height(growth percentile) Weight(growth percentile) 125/78 (BP 1 Location: Right arm, BP Patient Position: Sitting) (!) 55 97.8 °F (36.6 °C) (Oral) 16 6' 1\" (1.854 m) 176 lb (79.8 kg) SpO2 BMI Smoking Status 99% 23.22 kg/m2 Current Every Day Smoker Vitals History BMI and BSA Data Body Mass Index Body Surface Area  
 23.22 kg/m 2 2.03 m 2 Preferred Pharmacy Pharmacy Name Phone Wilda Narvaez [address] [facility], [address] [place] [address] [phone] Your Updated Medication List  
  
   
This list is accurate as of 7/23/18  8:35 AM.  Always use your most recent med list.  
  
  
  
  
 aspirin 325 mg tablet Commonly known as:  ASPIRIN Take 325 mg by mouth daily. dextroamphetamine-amphetamine 20 mg tablet Commonly known as:  ADDERALL Take 1 Tab (20 mg total) by mouth dailyEarliest Fill Date: 7/22/18. Max Daily Amount: 20 mg  
  
 donepezil 10 mg tablet Commonly known as:  ARICEPT  
TAKE 1 TABLET BY MOUTH EVERY NIGHT  
  
 FISH OIL PO Take 1,200 mg by mouth daily. furosemide 20 mg tablet Commonly known as:  LASIX Take 1 Tab by mouth daily. M-VIT PO Take  by mouth.  
  
 memantine 10 mg tablet Commonly known as:  [place] TAKE 1 TABLET BY MOUTH TWICE DAILY  
  
 metoprolol succinate 50 mg XL tablet Commonly known as:  TOPROL XL Take 1 Tab by mouth daily. niacin  mg tablet Commonly known as:  Niaspan Take 1 Tab by mouth nightly. OMEGA 3 PO Take  by mouth. POTASSIUM PO Take 550 mg by mouth. rosuvastatin 20 mg tablet Commonly known as:  CRESTOR Take 1 Tab by mouth daily. Prescriptions Sent to Pharmacy Refills  
 furosemide (LASIX) 20 mg tablet 5 Sig: Take 1 Tab by mouth daily.   
 Class: Normal  
 Pharmacy: [facility] [address] [place] [address], [address] [place] [address] #: [phone] Route: Oral  
  
Follow-up Instructions Return in about 4 weeks (around 8/20/2018) for [address] and labs/bp check after 8/18/18. Introducing [facility] & HEALTH SERVICES! [employer] introduces [employer] patient portal. Now you can access parts of your medical record, email your doctor's office, and request medication refills online. 1. In your internet browser, go to [email or web address] 2. Click on the First Time User? Click Here link in the Sign In box. You will see the New Member Sign Up page. 3. Enter your [employer] Access Code exactly as it appears below. You will not need to use this code after youve completed the sign-up process. If you do not sign up before the expiration date, you must request a new code. · [employer] Access Code: [email or web address] Expires: 8/20/2018  9:43 AM 
 
4. Enter the last four digits of your Social Security Number (xxxx) and Date of Birth (mm/dd/yyyy) as indicated and click Submit. You will be taken to the next sign-up page. 5. Create a [employer] Interactive ID. This will be your [employer] login ID and cannot be changed, so think of one that is secure and easy to remember. 6. Create a [employer] password. You can change your password at any time. 7. Enter your Password Reset Question and Answer. This can be used at a later time if you forget your password. 8. Enter your e-mail address. You will receive e-mail notification when new information is available in [address] 9. Click Sign Up. You can now view and download portions of your medical record. 10. Click the Download Summary menu link to download a portable copy of your medical information. If you have questions, please visit the Frequently Asked Questions section of the [employer] website. Remember, [employer] is NOT to be used for urgent needs. For medical emergencies, dial 911. Now available from your iPhone and Android! Please provide this summary of care documentation to your next provider. Your primary care clinician is listed as TATIANA BRAUN. If you have any questions after today's visit, please call [phone].

## 2018-07-23 NOTE — PROGRESS NOTES
Chief Complaint   Patient presents with    Foot Swelling     Patient present during walk in clinic with sx that began one week ago. Pt denies injury,pt states he was out in back yard doing garden work. Have not iced rash prior to swelling. Have not treated with otc. 1. Have you been to the ER, urgent care clinic since your last visit? Hospitalized since your last visit? No    2. Have you seen or consulted any other health care providers outside of the [address] [place] [address] since your last visit? Include any pap smears or colon screening.  No

## 2018-07-25 NOTE — PROGRESS NOTES
HISTORY OF PRESENT ILLNESS  De Grazyna Villalba. is a 76 y.o. male. HPI  douglas pain and swelling of the feet  No injury  Does not watch salt or diet  No pmh CHF but does have htn  No med changes    ROS  A comprehensive review of system was obtained and negative except findings in the HPI    Visit Vitals    /78 (BP 1 Location: Right arm, BP Patient Position: Sitting)    Pulse (!) 55    Temp 97.8 °F (36.6 °C) (Oral)    Resp 16    Ht 6' 1\" (1.854 m)    Wt 176 lb (79.8 kg)    SpO2 99%    BMI 23.22 kg/m2     Physical Exam   Constitutional: He appears well-developed and well-nourished. No distress. Cardiovascular: Normal rate and regular rhythm. No murmur heard. Pulmonary/Chest: Breath sounds normal. No respiratory distress. Musculoskeletal: He exhibits edema. 2+ swelling douglas feet with redness of the skin but no heat   Nursing note and vitals reviewed. ASSESSMENT and PLAN  Encounter Diagnoses   Name Primary?  Essential hypertension Yes    Edema, unspecified type      Orders Placed This Encounter    furosemide (LASIX) 20 mg tablet     Start Lasix 20mg a day  Recheck 2 weeks  Reviewed salt intake and increase in water  I have discussed the diagnosis with the patient and the intended plan as seen in the above orders. The patient has received an after-visit summary and questions were answered concerning future plans. Patient conveyed understanding of the plan at the time of the visit.     Kenny Crawford, MSN, ANP  7/24/2018

## 2018-08-20 ENCOUNTER — OFFICE VISIT (OUTPATIENT)
Dept: FAMILY MEDICINE CLINIC | Age: 68
End: 2018-08-20

## 2018-08-20 VITALS
TEMPERATURE: 98 F | BODY MASS INDEX: 23.33 KG/M2 | HEIGHT: 73 IN | SYSTOLIC BLOOD PRESSURE: 114 MMHG | RESPIRATION RATE: 14 BRPM | DIASTOLIC BLOOD PRESSURE: 71 MMHG | HEART RATE: 60 BPM | OXYGEN SATURATION: 97 % | WEIGHT: 176 LBS

## 2018-08-20 DIAGNOSIS — E11.9 TYPE 2 DIABETES MELLITUS WITHOUT COMPLICATION, WITHOUT LONG-TERM CURRENT USE OF INSULIN (HCC): ICD-10-CM

## 2018-08-20 DIAGNOSIS — Z12.5 SCREENING PSA (PROSTATE SPECIFIC ANTIGEN): ICD-10-CM

## 2018-08-20 DIAGNOSIS — I10 ESSENTIAL HYPERTENSION: ICD-10-CM

## 2018-08-20 DIAGNOSIS — F02.81 ALZHEIMER'S DEMENTIA WITH BEHAVIORAL DISTURBANCE, UNSPECIFIED TIMING OF DEMENTIA ONSET: ICD-10-CM

## 2018-08-20 DIAGNOSIS — Z00.00 WELL ADULT EXAM: Primary | ICD-10-CM

## 2018-08-20 DIAGNOSIS — G30.9 ALZHEIMER'S DEMENTIA WITH BEHAVIORAL DISTURBANCE, UNSPECIFIED TIMING OF DEMENTIA ONSET: ICD-10-CM

## 2018-08-20 DIAGNOSIS — E78.00 HYPERCHOLESTEREMIA: ICD-10-CM

## 2018-08-20 PROBLEM — F02.818 ALZHEIMER'S DEMENTIA WITH BEHAVIORAL DISTURBANCE (HCC): Status: ACTIVE | Noted: 2018-08-20

## 2018-08-20 RX ORDER — NIACIN 500 MG/1
500 TABLET, EXTENDED RELEASE ORAL
Qty: 90 TAB | Refills: 3 | Status: SHIPPED | OUTPATIENT
Start: 2018-08-20 | End: 2018-11-02 | Stop reason: SDUPTHER

## 2018-08-20 RX ORDER — ROSUVASTATIN CALCIUM 20 MG/1
20 TABLET, COATED ORAL DAILY
Qty: 90 TAB | Refills: 3 | Status: SHIPPED | OUTPATIENT
Start: 2018-08-20 | End: 2019-09-16 | Stop reason: SDUPTHER

## 2018-08-20 RX ORDER — SILDENAFIL CITRATE 20 MG/1
TABLET ORAL
Refills: 5 | COMMUNITY
Start: 2018-07-25 | End: 2018-11-27 | Stop reason: ALTCHOICE

## 2018-08-20 RX ORDER — METOPROLOL SUCCINATE 50 MG/1
50 TABLET, EXTENDED RELEASE ORAL DAILY
Qty: 90 TAB | Refills: 3 | Status: SHIPPED | OUTPATIENT
Start: 2018-08-20 | End: 2018-12-14 | Stop reason: SDUPTHER

## 2018-08-20 NOTE — MR AVS SNAPSHOT
[address] [address] 
[phone] Patient: Kathleen Hong Sr. MRN: [id number] FOM:[birth year] Visit Information Date & Time Provider Department Dept. Phone Encounter #  
 8/20/2018  1:30 PM Shayy Garcia NP [address] [facility] [phone] [id number] Your Appointments 8/21/2018  9:00 AM  
Any with Bridget Vaughn NP [address] [facility] ([facility]) Appt Note: 3 month f/u memory leathw [place] [address] [facility] [address] [facility] 99 [address] [phone]  
  
   
 [place] [address] [address] [address] [place] Upcoming Health Maintenance Date Due Hepatitis C Screening 1950 DTaP/Tdap/Td series (1 - Tdap) 4/1/1971 ZOSTER VACCINE AGE 60> 2/1/2010 COLONOSCOPY 6/21/2016 FOOT EXAM Q1 11/24/2016 Pneumococcal 65+ High/Highest Risk (2 of 2 - PPSV23) 9/26/2017 HEMOGLOBIN A1C Q6M 2/18/2018 Influenza Age 5 to Adult 8/1/2018 MICROALBUMIN Q1 8/18/2018 LIPID PANEL Q1 8/18/2018 MEDICARE YEARLY EXAM 8/19/2018 EYE EXAM RETINAL OR DILATED Q1 9/22/2018 GLAUCOMA SCREENING Q2Y 9/22/2019 Allergies as of 8/20/2018  Review Complete On: 8/20/2018 By: Isaiah Poole No Known Allergies Current Immunizations  Reviewed on 11/24/2015 Name Date Influenza Vaccine 9/22/2016, 10/5/2014 Influenza Vaccine Split 11/26/2012, 12/20/2011 Influenza Vaccine Whole 12/20/2011 Pneumococcal Conjugate (PCV-13) 8/1/2017, 11/24/2015 ZZZ-RETIRED (DO NOT USE) Pneumococcal Vaccine (Unspecified Type) 12/20/2011, 12/20/2011 Not reviewed this visit You Were Diagnosed With   
  
 Codes Comments Well adult exam    -  Primary ICD-10-CM: Z00.00 ICD-9-CM: V70.0 Type 2 diabetes mellitus without complication, without long-term current use of insulin (HCC)     ICD-10-CM: E11.9 ICD-9-CM: 250.00 Hypercholesteremia     ICD-10-CM: E78.00 ICD-9-CM: 272.0 Essential hypertension     ICD-10-CM: I10 
ICD-9-CM: 401.9 Screening PSA (prostate specific antigen)     ICD-10-CM: Z12.5 ICD-9-CM: V76.44 Vitals BP Pulse Temp Resp Height(growth percentile) Weight(growth percentile) 114/71 (BP 1 Location: Left arm, BP Patient Position: Sitting) 60 98 °F (36.7 °C) (Oral) 14 6' 1\" (1.854 m) 176 lb (79.8 kg) SpO2 BMI Smoking Status 97% 23.22 kg/m2 Current Every Day Smoker Vitals History BMI and BSA Data Body Mass Index Body Surface Area  
 23.22 kg/m 2 2.03 m 2 Preferred Pharmacy Pharmacy Name Phone [facility], [facility] [phone] Your Updated Medication List  
  
   
This list is accurate as of 8/20/18  2:13 PM.  Always use your most recent med list.  
  
  
  
  
 aspirin 325 mg tablet Commonly known as:  ASPIRIN Take 325 mg by mouth daily. dextroamphetamine-amphetamine 20 mg tablet Commonly known as:  ADDERALL Take 1 Tab (20 mg total) by mouth dailyEarliest Fill Date: 7/22/18. Max Daily Amount: 20 mg  
  
 donepezil 10 mg tablet Commonly known as:  ARICEPT  
TAKE 1 TABLET BY MOUTH EVERY NIGHT  
  
 FISH OIL PO Take 1,200 mg by mouth daily. furosemide 20 mg tablet Commonly known as:  LASIX Take 1 Tab by mouth daily. M-VIT PO Take  by mouth.  
  
 memantine 10 mg tablet Commonly known as:  [place] TAKE 1 TABLET BY MOUTH TWICE DAILY  
  
 metoprolol succinate 50 mg XL tablet Commonly known as:  TOPROL XL Take 1 Tab by mouth daily. niacin  mg tablet Commonly known as:  Niaspan Take 1 Tab by mouth nightly. OMEGA 3 PO Take  by mouth. POTASSIUM PO Take 550 mg by mouth. rosuvastatin 20 mg tablet Commonly known as:  CRESTOR Take 1 Tab by mouth daily. sildenafil (antihypertensive) 20 mg tablet Commonly known as:  REVATIO TK UP TO 5 TS PO AS DIRECTED Prescriptions Sent to Pharmacy Refills  
 rosuvastatin (CRESTOR) 20 mg tablet 3 Sig: Take 1 Tab by mouth daily. Class: Normal  
 Pharmacy: [address] [facility], [address] [place] [address] Ph #: [phone] Route: Oral  
 niacin ER (NIASPAN) 500 mg tablet 3 Sig: Take 1 Tab by mouth nightly. Class: Normal  
 Pharmacy: [address] [facility], [address] [place] [address] Ph #: [phone] Route: Oral  
 metoprolol succinate (TOPROL XL) 50 mg XL tablet 3 Sig: Take 1 Tab by mouth daily. Class: Normal  
 Pharmacy: [address] [facility], [address] [place] [address] Ph #: [phone] Route: Oral  
  
We Performed the Following CBC WITH AUTOMATED DIFF [61521 CPT(R)] HEMOGLOBIN A1C WITH EAG [80518 CPT(R)] LIPID PANEL [77577 CPT(R)] METABOLIC PANEL, COMPREHENSIVE [83630 CPT(R)] MICROALBUMIN, UR, RAND W/ MICROALB/CREAT RATIO [id number] CPT(R)] PSA, DIAGNOSTIC (PROSTATE SPECIFIC AG) [id number] CPT(R)] TSH 3RD GENERATION [65518 CPT(R)] Introducing \[facility]\"" & HEALTH SERVICES! [employer] introduces [employer] patient portal. Now you can access parts of your medical record, email your doctor's office, and request medication refills online. 1. In your internet browser, go to [email or web address] 2. Click on the First Time User? Click Here link in the Sign In box. You will see the New Member Sign Up page. 3. Enter your [employer] Access Code exactly as it appears below. You will not need to use this code after youve completed the sign-up process. If you do not sign up before the expiration date, you must request a new code. · [employer] Access Code: [email or web address] Expires: 11/18/2018  2:11 PM 
 
4.  Enter the last four digits of your Social Security Number (xxxx) and Date of Birth (mm/dd/yyyy) as indicated and click Submit. You will be taken to the next sign-up page. 5. Create a [employer] ID. This will be your [employer] login ID and cannot be changed, so think of one that is secure and easy to remember. 6. Create a [employer] password. You can change your password at any time. 7. Enter your Password Reset Question and Answer. This can be used at a later time if you forget your password. 8. Enter your e-mail address. You will receive e-mail notification when new information is available in [address] 9. Click Sign Up. You can now view and download portions of your medical record. 10. Click the Download Summary menu link to download a portable copy of your medical information. If you have questions, please visit the Frequently Asked Questions section of the [employer] website. Remember, [employer] is NOT to be used for urgent needs. For medical emergencies, dial 911. Now available from your iPhone and Android! Please provide this summary of care documentation to your next provider. Your primary care clinician is listed as TATIANA BRAUN. If you have any questions after today's visit, please call [phone].

## 2018-08-20 NOTE — PROGRESS NOTES
Chief Complaint   Patient presents with    Complete Physical     1. Have you been to the ER, urgent care clinic since your last visit? Hospitalized since your last visit? No    2. Have you seen or consulted any other health care providers outside of the [facility] since your last visit? Include any pap smears or colon screening.  No

## 2018-08-20 NOTE — PROGRESS NOTES
This is the Subsequent Medicare Annual Wellness Exam, performed 12 months or more after the Initial AWV or the last Subsequent AWV  I have reviewed the patient's medical history in detail and updated the computerized patient record. History     Past Medical History:   Diagnosis Date    Alcoholism ([facility]     CAD (coronary artery disease) 6/15/2010    DM (diabetes mellitus) ([facility] 6/15/2010    Falls     Heart disease     HTN (hypertension) 6/15/2010    Hypercholesteremia 6/15/2010    PTSD (post-traumatic stress disorder)     Skin cancer 6/15/2010    Smoker 6/15/2010    Snoring       Past Surgical History:   Procedure Laterality Date    CARDIAC SURG PROCEDURE UNLIST      stent     Current Outpatient Prescriptions   Medication Sig Dispense Refill    sildenafil, antihypertensive, (REVATIO) 20 mg tablet TK UP TO 5 TS PO AS DIRECTED  5    rosuvastatin (CRESTOR) 20 mg tablet Take 1 Tab by mouth daily. 90 Tab 3    niacin ER (NIASPAN) 500 mg tablet Take 1 Tab by mouth nightly. 90 Tab 3    metoprolol succinate (TOPROL XL) 50 mg XL tablet Take 1 Tab by mouth daily. 90 Tab 3    furosemide (LASIX) 20 mg tablet Take 1 Tab by mouth daily. 30 Tab 5    memantine (NAMENDA) 10 mg tablet TAKE 1 TABLET BY MOUTH TWICE DAILY 60 Tab 5    donepezil (ARICEPT) 10 mg tablet TAKE 1 TABLET BY MOUTH EVERY NIGHT 30 Tab 5    dextroamphetamine-amphetamine (ADDERALL) 20 mg tablet Take 1 Tab (20 mg total) by mouth dailyEarliest Fill Date: 7/22/18. Max Daily Amount: 20 mg 30 Tab 0    DOCOSAHEXANOIC ACID/EPA (FISH OIL PO) Take 1,200 mg by mouth daily.  PV W-O LAZARUS/FERROUS FUMARATE/FA (M-VIT PO) Take  by mouth.  POTASSIUM PO Take 550 mg by mouth.  OMEGA-3 FATTY ACIDS (OMEGA 3 PO) Take  by mouth.  aspirin (ASPIRIN) 325 mg tablet Take 325 mg by mouth daily.        No Known Allergies  Family History   Problem Relation Age of Onset    Cancer Mother     Heart Disease Other      Social History   Substance Use Topics  Smoking status: Current Every Day Smoker     Packs/day: 1.00    Smokeless tobacco: Never Used    Alcohol use 2.4 oz/week     4 Cans of beer per week     Patient Active Problem List   Diagnosis Code    Hypercholesteremia E78.00    Skin cancer C44.90    CAD (coronary artery disease) I25.10    HTN (hypertension) I10    DM (diabetes mellitus) ([facility] E11.9    Smoker F17.200    Advance care planning Z71.89    Advanced care planning/counseling discussion Z71.89       Depression Risk Factor Screening:     PHQ over the last two weeks 8/20/2018   Little interest or pleasure in doing things Not at all   Feeling down, depressed, irritable, or hopeless Not at all   Total Score PHQ 2 0     Alcohol Risk Factor Screening: You do not drink alcohol or very rarely. Functional Ability and Level of Safety:   Hearing Loss  Hearing is good. Activities of Daily Living  The home contains: no safety equipment. Patient needs help with:  transportation, shopping, preparing meals, laundry, housework, managing medications and managing money    Fall Risk  Fall Risk Assessment, last 12 mths 8/20/2018   Able to walk? Yes   Fall in past 12 months?  No       Abuse Screen  Patient is not abused    Cognitive Screening   Evaluation of Cognitive Function:  Has your family/caregiver stated any concerns about your memory: yes  He is actively followed by Neuro for advanced Alzheimer's disease    Patient Care Team   Patient Care Team:  Maria Isabel Pendleton MD as PCP - Zuleyka Arteaga MD (Neurology)  Nena Rodriguez NP (Nurse Practitioner)  Steve Weaver PsyD as Physician (Psychology)    Visit Vitals    /71 (BP 1 Location: Left arm, BP Patient Position: Sitting)    Pulse 60    Temp 98 °F (36.7 °C) (Oral)    Resp 14    Ht 6' 1\" (1.854 m)    Wt 176 lb (79.8 kg)    SpO2 97%    BMI 23.22 kg/m2     Physical Examination:   GENERAL ASSESSMENT: well developed and well nourished  CHEST: normal air exchange, no rales, no rhonchi, no wheezes, respiratory effort normal with no retractions  HEART: regular rate and rhythm, normal S1/S2, no murmurs    Assessment/Plan   Education and counseling provided:  Are appropriate based on today's review and evaluation    Diagnoses and all orders for this visit:    1. Well adult exam  -     CBC WITH AUTOMATED DIFF  -     LIPID PANEL  -     METABOLIC PANEL, COMPREHENSIVE  -     TSH 3RD GENERATION    2. Type 2 diabetes mellitus without complication, without long-term current use of insulin (HCC)  -     HEMOGLOBIN A1C WITH EAG  -     MICROALBUMIN, UR, RAND W/ MICROALB/CREAT RATIO    3. Hypercholesteremia  -     LIPID PANEL  -     rosuvastatin (CRESTOR) 20 mg tablet; Take 1 Tab by mouth daily. -     niacin ER (NIASPAN) 500 mg tablet; Take 1 Tab by mouth nightly. 4. Essential hypertension  -     CBC WITH AUTOMATED DIFF  -     METABOLIC PANEL, COMPREHENSIVE    5. Screening PSA (prostate specific antigen)  -     PROSTATE SPECIFIC AG    Other orders  -     metoprolol succinate (TOPROL XL) 50 mg XL tablet; Take 1 Tab by mouth daily. I have discussed the diagnosis with the patient and the intended plan as seen in the above orders. The patient has received an after-visit summary and questions were answered concerning future plans. Patient conveyed understanding of the plan at the time of the visit.     Luz Maria Cochran, MSN, ANP  8/20/2018

## 2018-08-20 NOTE — PATIENT INSTRUCTIONS
Medicare Wellness Visit, Male    The best way to live healthy is to have a lifestyle where you eat a well-balanced diet, exercise regularly, limit alcohol use, and quit all forms of tobacco/nicotine, if applicable. Regular preventive services are another way to keep healthy. Preventive services (vaccines, screening tests, monitoring & exams) can help personalize your care plan, which helps you manage your own care. Screening tests can find health problems at the earliest stages, when they are easiest to treat. [facility] follows the current, evidence-based guidelines published by the [facility] ([facility]) when recommending preventive services for our patients. Because we follow these guidelines, sometimes recommendations change over time as research supports it. (For example, a prostate screening blood test is no longer routinely recommended for men with no symptoms.)    Of course, you and your provider may decide to screen more often for some diseases, based on your risk and co-morbidities (chronic disease you are already diagnosed with). Preventive services for you include:    - Medicare offers their members a free annual wellness visit, which is time for you and your primary care provider to discuss and plan for your preventive service needs. Take advantage of this benefit every year!    -All people over age 72 should receive the recommended pneumonia vaccines. Current USPSTF guidelines recommend a series of two vaccines for the best pneumonia protection.     -All adults should have a yearly flu vaccine and a tetanus vaccine every 10 years.  All adults age 61 years should receive a shingles vaccine once in their lifetime.      -All adults age 38-68 years who are overweight should have a diabetes screening test once every three years.     -Other screening tests & preventive services for persons with diabetes include: an eye exam to screen for diabetic retinopathy, a kidney function test, a foot exam, and stricter control over your cholesterol.     -Cardiovascular screening for adults with routine risk involves an electrocardiogram (ECG) at intervals determined by the provider.     -Colorectal cancer screenings should be done for adults age 54-65 years with normal risk. There are a number of acceptable methods of screening for this type of cancer. Each test has its own benefits and drawbacks. Discuss with your provider what is most appropriate for you during your annual wellness visit. The different tests include: colonoscopy (considered the best screening method), a fecal occult blood test, a fecal DNA test, and sigmoidoscopy.    -All adults born between [facility] should be screened once for Hepatitis C.    -An Abdominal Aortic Aneurysm (AAA) Screening is recommended for men age 73-68 who has ever smoked in their lifetime.      Here is a list of your current Health Maintenance items (your personalized list of preventive services) with a due date:  Health Maintenance Due   Topic Date Due    Hepatitis C Test  1950    DTaP/Tdap/Td  (1 - Tdap) 04/01/1971    Shingles Vaccine  02/01/2010    Colonoscopy  06/21/2016    Diabetic Foot Care  11/24/2016    Pneumococcal Vaccine (2 of 2 - PPSV23) 09/26/2017    Hemoglobin A1C    02/18/2018    Flu Vaccine  08/01/2018    Albumin Urine Test  08/18/2018    Cholesterol Test   08/18/2018    Annual Well Visit  08/19/2018

## 2018-08-21 ENCOUNTER — OFFICE VISIT (OUTPATIENT)
Dept: NEUROLOGY | Age: 68
End: 2018-08-21

## 2018-08-21 VITALS
WEIGHT: 176 LBS | HEIGHT: 73 IN | SYSTOLIC BLOOD PRESSURE: 118 MMHG | DIASTOLIC BLOOD PRESSURE: 80 MMHG | BODY MASS INDEX: 23.33 KG/M2

## 2018-08-21 DIAGNOSIS — F02.81 ALZHEIMER'S DEMENTIA WITH BEHAVIORAL DISTURBANCE, UNSPECIFIED TIMING OF DEMENTIA ONSET: Primary | ICD-10-CM

## 2018-08-21 DIAGNOSIS — G30.9 ALZHEIMER'S DEMENTIA WITH BEHAVIORAL DISTURBANCE, UNSPECIFIED TIMING OF DEMENTIA ONSET: Primary | ICD-10-CM

## 2018-08-21 LAB
ALBUMIN SERPL-MCNC: 4.8 G/DL (ref 3.6–4.8)
ALBUMIN/CREAT UR: 3.1 MG/G CREAT (ref 0–30)
ALBUMIN/GLOB SERPL: 1.9 {RATIO} (ref 1.2–2.2)
ALP SERPL-CCNC: 70 IU/L (ref 39–117)
ALT SERPL-CCNC: 23 IU/L (ref 0–44)
AST SERPL-CCNC: 36 IU/L (ref 0–40)
BASOPHILS # BLD AUTO: 0 X10E3/UL (ref 0–0.2)
BASOPHILS NFR BLD AUTO: 0 %
BILIRUB SERPL-MCNC: 0.2 MG/DL (ref 0–1.2)
BUN SERPL-MCNC: 24 MG/DL (ref 8–27)
BUN/CREAT SERPL: 25 (ref 10–24)
CALCIUM SERPL-MCNC: 10.1 MG/DL (ref 8.6–10.2)
CHLORIDE SERPL-SCNC: 103 MMOL/L (ref 96–106)
CHOLEST SERPL-MCNC: 171 MG/DL (ref 100–199)
CO2 SERPL-SCNC: 24 MMOL/L (ref 20–29)
CREAT SERPL-MCNC: 0.95 MG/DL (ref 0.76–1.27)
CREAT UR-MCNC: 123.9 MG/DL
EOSINOPHIL # BLD AUTO: 0.2 X10E3/UL (ref 0–0.4)
EOSINOPHIL NFR BLD AUTO: 3 %
ERYTHROCYTE [DISTWIDTH] IN BLOOD BY AUTOMATED COUNT: 13.7 % (ref 12.3–15.4)
EST. AVERAGE GLUCOSE BLD GHB EST-MCNC: 131 MG/DL
GLOBULIN SER CALC-MCNC: 2.5 G/DL (ref 1.5–4.5)
GLUCOSE SERPL-MCNC: 93 MG/DL (ref 65–99)
HBA1C MFR BLD: 6.2 % (ref 4.8–5.6)
HCT VFR BLD AUTO: 44.3 % (ref 37.5–51)
HDLC SERPL-MCNC: 72 MG/DL
HGB BLD-MCNC: 14.7 G/DL (ref 13–17.7)
IMM GRANULOCYTES # BLD: 0 X10E3/UL (ref 0–0.1)
IMM GRANULOCYTES NFR BLD: 0 %
INTERPRETATION, 910389: NORMAL
LDLC SERPL CALC-MCNC: 83 MG/DL (ref 0–99)
LYMPHOCYTES # BLD AUTO: 3.4 X10E3/UL (ref 0.7–3.1)
LYMPHOCYTES NFR BLD AUTO: 44 %
Lab: NORMAL
MCH RBC QN AUTO: 32.7 PG (ref 26.6–33)
MCHC RBC AUTO-ENTMCNC: 33.2 G/DL (ref 31.5–35.7)
MCV RBC AUTO: 98 FL (ref 79–97)
MICROALBUMIN UR-MCNC: 3.9 UG/ML
MONOCYTES # BLD AUTO: 0.6 X10E3/UL (ref 0.1–0.9)
MONOCYTES NFR BLD AUTO: 7 %
NEUTROPHILS # BLD AUTO: 3.5 X10E3/UL (ref 1.4–7)
NEUTROPHILS NFR BLD AUTO: 46 %
PLATELET # BLD AUTO: 169 X10E3/UL (ref 150–379)
POTASSIUM SERPL-SCNC: 4.3 MMOL/L (ref 3.5–5.2)
PROT SERPL-MCNC: 7.3 G/DL (ref 6–8.5)
PSA SERPL-MCNC: 3.5 NG/ML (ref 0–4)
RBC # BLD AUTO: 4.5 X10E6/UL (ref 4.14–5.8)
SODIUM SERPL-SCNC: 141 MMOL/L (ref 134–144)
TRIGL SERPL-MCNC: 78 MG/DL (ref 0–149)
TSH SERPL DL<=0.005 MIU/L-ACNC: 3.01 UIU/ML (ref 0.45–4.5)
VLDLC SERPL CALC-MCNC: 16 MG/DL (ref 5–40)
WBC # BLD AUTO: 7.7 X10E3/UL (ref 3.4–10.8)

## 2018-08-21 RX ORDER — DEXTROAMPHETAMINE SACCHARATE, AMPHETAMINE ASPARTATE, DEXTROAMPHETAMINE SULFATE AND AMPHETAMINE SULFATE 5; 5; 5; 5 MG/1; MG/1; MG/1; MG/1
20 TABLET ORAL DAILY
Qty: 30 TAB | Refills: 0 | Status: SHIPPED | OUTPATIENT
Start: 2018-09-21 | End: 2018-08-21 | Stop reason: SDUPTHER

## 2018-08-21 RX ORDER — DEXTROAMPHETAMINE SACCHARATE, AMPHETAMINE ASPARTATE, DEXTROAMPHETAMINE SULFATE AND AMPHETAMINE SULFATE 5; 5; 5; 5 MG/1; MG/1; MG/1; MG/1
20 TABLET ORAL DAILY
Qty: 30 TAB | Refills: 0 | Status: SHIPPED | OUTPATIENT
Start: 2018-08-21 | End: 2018-08-21 | Stop reason: SDUPTHER

## 2018-08-21 RX ORDER — DEXTROAMPHETAMINE SACCHARATE, AMPHETAMINE ASPARTATE, DEXTROAMPHETAMINE SULFATE AND AMPHETAMINE SULFATE 5; 5; 5; 5 MG/1; MG/1; MG/1; MG/1
20 TABLET ORAL DAILY
Qty: 30 TAB | Refills: 0 | Status: SHIPPED | OUTPATIENT
Start: 2018-10-21 | End: 2018-10-16

## 2018-08-21 RX ORDER — ESCITALOPRAM OXALATE 10 MG/1
10 TABLET ORAL DAILY
Qty: 30 TAB | Refills: 5 | Status: SHIPPED | OUTPATIENT
Start: 2018-08-21 | End: 2018-10-16

## 2018-08-21 NOTE — MR AVS SNAPSHOT
[facility] 
 
 
 [place] [address] [place] [address] [phone] Patient: Dylan Hope Sr. MRN: [id number] Visit Information Date & Time Provider Department Dept. Phone Encounter #  
 8/21/2018  9:00 AM Yana Aguirre NP [facility] Neurology [facility] [phone] [id number] Follow-up Instructions Return in about 2 months (around 10/21/2018). Your Appointments 10/16/2018 10:00 AM  
[facility] Appt Note: 2 month [facility] [address] [place] [id number] [phone]  
  
   
 [facility] [address]  
  
    
 11/20/2018 11:00 AM  
ESTABLISHED PATIENT with Fabiana Dover NP [address] [facility] ([facility]) Appt Note: 3 months f/u  
 [address] [address] [address]  
[phone]  
  
   
 [address] [address] Upcoming Health Maintenance Date Due Hepatitis C Screening 1950 DTaP/Tdap/Td series (1 - Tdap) 4/1/1971 ZOSTER VACCINE AGE 60> 2/1/2010 COLONOSCOPY 6/21/2016 FOOT EXAM Q1 11/24/2016 Pneumococcal 65+ High/Highest Risk (2 of 2 - PPSV23) 9/26/2017 HEMOGLOBIN A1C Q6M 2/18/2018 Influenza Age 5 to Adult 8/1/2018 MICROALBUMIN Q1 8/18/2018 LIPID PANEL Q1 8/18/2018 EYE EXAM RETINAL OR DILATED Q1 9/22/2018 MEDICARE YEARLY EXAM 8/21/2019 GLAUCOMA SCREENING Q2Y 9/22/2019 Allergies as of 8/21/2018  Review Complete On: 8/21/2018 By: Deonna Raya No Known Allergies Current Immunizations  Reviewed on 11/24/2015 Name Date Influenza Vaccine 9/22/2016, 10/5/2014 Influenza Vaccine Split 11/26/2012, 12/20/2011 Influenza Vaccine Whole 12/20/2011 Pneumococcal Conjugate (PCV-13) 8/1/2017, 11/24/2015 ZZZ-RETIRED (DO NOT USE) Pneumococcal Vaccine (Unspecified Type) 12/20/2011, 12/20/2011 Not reviewed this visit You Were Diagnosed With   
  
 Codes Comments Alzheimer's dementia with behavioral disturbance, unspecified timing of dementia onset    -  Primary ICD-10-CM: G30.9, F02.81 ICD-9-CM: 331.0, 294.11 Vitals BP Height(growth percentile) Weight(growth percentile) BMI Smoking Status 118/80 6' 1\" (1.854 m) 176 lb (79.8 kg) 23.22 kg/m2 Current Every Day Smoker BMI and BSA Data Body Mass Index Body Surface Area  
 23.22 kg/m 2 2.03 m 2 Preferred Pharmacy Pharmacy Name Phone [facility] [address] [place] [address], [address] [phone] Your Updated Medication List  
  
   
This list is accurate as of 8/21/18  9:50 AM.  Always use your most recent med list.  
  
  
  
  
 aspirin 325 mg tablet Commonly known as:  ASPIRIN Take 325 mg by mouth daily. dextroamphetamine-amphetamine 20 mg tablet Commonly known as:  ADDERALL Take 1 Tab (20 mg total) by mouth dailyEarliest Fill Date: 10/21/18. Max Daily Amount: 20 mg  
Start taking on:  10/21/2018  
  
 donepezil 10 mg tablet Commonly known as:  ARICEPT  
TAKE 1 TABLET BY MOUTH EVERY NIGHT  
  
 escitalopram oxalate 10 mg tablet Commonly known as:  Armour Denier Take 1 Tab by mouth daily. FISH OIL PO Take 1,200 mg by mouth daily. furosemide 20 mg tablet Commonly known as:  LASIX Take 1 Tab by mouth daily. M-VIT PO Take  by mouth.  
  
 memantine 10 mg tablet Commonly known as:  Sepulveda Anchors TAKE 1 TABLET BY MOUTH TWICE DAILY  
  
 metoprolol succinate 50 mg XL tablet Commonly known as:  TOPROL XL Take 1 Tab by mouth daily. niacin  mg tablet Commonly known as:  Niaspan Take 1 Tab by mouth nightly. OMEGA 3 PO Take  by mouth. POTASSIUM PO Take 550 mg by mouth. rosuvastatin 20 mg tablet Commonly known as:  CRESTOR Take 1 Tab by mouth daily. sildenafil (antihypertensive) 20 mg tablet Commonly known as:  REVATIO TK UP TO 5 TS PO AS DIRECTED Prescriptions Printed Refills  
 dextroamphetamine-amphetamine (ADDERALL) 20 mg tablet 0 Starting on: 10/21/2018 Sig: Take 1 Tab (20 mg total) by mouth dailyEarliest Fill Date: 10/21/18. Max Daily Amount: 20 mg  
 Class: Print Route: Oral  
  
Prescriptions Sent to Pharmacy Refills  
 escitalopram oxalate (LEXAPRO) 10 mg tablet 5 Sig: Take 1 Tab by mouth daily. Class: Normal  
 Pharmacy: [facility] [address] [place] [address] #: [phone] Route: Oral  
  
We Performed the Following REFERRAL TO NEUROPSYCHOLOGY [TIP16 Custom] Comments:  
 Dementia, re-test  
  
Follow-up Instructions Return in about 2 months (around 10/21/2018). Referral Information Referral ID Referred By Referred To  
  
 [id number] [facility] [address], [facility] [address] [facility], [address] [facility] Phone: [phone] Fax: [phone] Visits Status Start Date End Date 1 New Request 8/21/18 8/21/19 If your referral has a status of pending review or denied, additional information will be sent to support the outcome of this decision. Patient Instructions PRESCRIPTION REFILL POLICY [facility] Statement to Patients April 1, 2014 In an effort to ensure the large volume of patient prescription refills is processed in the most efficient and expeditious manner, we are asking our patients to assist us by calling your Pharmacy for all prescription refills, this will include also your  Mail Order Pharmacy. The pharmacy will contact our office electronically to continue the refill process. Please do not wait until the last minute to call your pharmacy. We need at least 48 hours (2days) to fill prescriptions. We also encourage you to call your pharmacy before going to  your prescription to make sure it is ready. With regard to controlled substance prescription refill requests (narcotic refills) that need to be picked up at our office, we ask your cooperation by providing us with at least 72 hours (3days) notice that you will need a refill. We will not refill narcotic prescription refill requests after 4:00pm on any weekday, Monday through Thursday, or after 2:00pm on Fridays, or on the weekends. We encourage everyone to explore another way of getting your prescription refill request processed using [employer], our patient web portal through our electronic medical record system. [employer] is an efficient and effective way to communicate your medication request directly to the office and  downloadable as an kevin on your smart phone . [employer] also features a review functionality that allows you to view your medication list as well as leave messages for your physician. Are you ready to get connected? If so please review the attatched instructions or speak to any of our staff to get you set up right away! Thank you so much for your cooperation. Should you have any questions please contact our Practice Administrator. The Physicians and Staff,  [facility] Introducing [facility] SERVICES! [facility] introduces [employer] patient portal. Now you can access parts of your medical record, email your doctor's office, and request medication refills online. 1. In your internet browser, go to [email or web address]/"[employer]"t 2. Click on the First Time User? Click Here link in the Sign In box. You will see the New Member Sign Up page. 3. Enter your [employer] Access Code exactly as it appears below.  You will not need to use this code after youve completed the sign-up process. If you do not sign up before the expiration date, you must request a new code. · [employer] Access Code: [email or web address] Expires: 11/18/2018  2:11 PM 
 
4. Enter the last four digits of your Social Security Number (xxxx) and Date of Birth (mm/dd/yyyy) as indicated and click Submit. You will be taken to the next sign-up page. 5. Create a [employer] ID. This will be your [employer] login ID and cannot be changed, so think of one that is secure and easy to remember. 6. Create a [employer] password. You can change your password at any time. 7. Enter your Password Reset Question and Answer. This can be used at a later time if you forget your password. 8. Enter your e-mail address. You will receive e-mail notification when new information is available in [address]. 9. Click Sign Up. You can now view and download portions of your medical record. 10. Click the Download Summary menu link to download a portable copy of your medical information. If you have questions, please visit the Frequently Asked Questions section of the [employer] website. Remember, [employer] is NOT to be used for urgent needs. For medical emergencies, dial 911. Now available from your iPhone and Android! Please provide this summary of care documentation to your next provider. Your primary care clinician is listed as Roel Cao. If you have any questions after today's visit, please call [phone].

## 2018-08-21 NOTE — PROGRESS NOTES
Jos Adams is a 76 y.o. male who presents with the following  Chief Complaint   Patient presents with   [address] comes in with wife for a follow up for Dementia. Currently  on Aricept 10 mg and namenda 10 mg BID and doing well with this. Feels his long term memory has been the same but his short term is getting worse still. She has to tell him things multiple times, will forget to take out trash will actually walk over it to do something else. Short term giving an example of he will get something out of the kitchen and will put it back in the wrong place. Wife will have to repeat herself, ask same questions. She does help him remember the medications day to day when he forgets even though they are on his bedside table. He will get stressed, agitated on a daily basis with her continuing to remind him what to do and day to day has trouble with feeling overwhelmed, over stimulated. Watching tv during the day also.  Adderall 20 mg has really helped him focus better, concentrate, get things done per his wife. Overall she and family think he has gotten worse day to day and his overall short term memory has suffered. No Known Allergies    Current Outpatient Prescriptions   Medication Sig    escitalopram oxalate (LEXAPRO) 10 mg tablet Take 1 Tab by mouth daily.  [START ON 10/21/2018] dextroamphetamine-amphetamine (ADDERALL) 20 mg tablet Take 1 Tab (20 mg total) by mouth dailyEarliest Fill Date: 10/21/18. Max Daily Amount: 20 mg    sildenafil, antihypertensive, (REVATIO) 20 mg tablet TK UP TO 5 TS PO AS DIRECTED    rosuvastatin (CRESTOR) 20 mg tablet Take 1 Tab by mouth daily.  niacin ER (NIASPAN) 500 mg tablet Take 1 Tab by mouth nightly.  metoprolol succinate (TOPROL XL) 50 mg XL tablet Take 1 Tab by mouth daily.  furosemide (LASIX) 20 mg tablet Take 1 Tab by mouth daily.     memantine (NAMENDA) 10 mg tablet TAKE 1 TABLET BY MOUTH TWICE DAILY    donepezil (ARICEPT) 10 mg tablet TAKE 1 TABLET BY MOUTH EVERY NIGHT    DOCOSAHEXANOIC ACID/EPA (FISH OIL PO) Take 1,200 mg by mouth daily.  PV W-O LAZARUS/FERROUS FUMARATE/FA (M-VIT PO) Take  by mouth.  POTASSIUM PO Take 550 mg by mouth.  OMEGA-3 FATTY ACIDS (OMEGA 3 PO) Take  by mouth.  aspirin (ASPIRIN) 325 mg tablet Take 325 mg by mouth daily. No current facility-administered medications for this visit. History   Smoking Status    Current Every Day Smoker    Packs/day: 1.00   Smokeless Tobacco    Never Used       Past Medical History:   Diagnosis Date    Alcoholism ([facility] [place] [address]     CAD (coronary artery disease) 6/15/2010    DM (diabetes mellitus) ([facility] [place] [address] 6/15/2010    Falls     Heart disease     HTN (hypertension) 6/15/2010    Hypercholesteremia 6/15/2010    PTSD (post-traumatic stress disorder)     Skin cancer 6/15/2010    Smoker 6/15/2010    Snoring        Past Surgical History:   Procedure Laterality Date    CARDIAC SURG PROCEDURE UNLIST      stent       Family History   Problem Relation Age of Onset    Cancer Mother     Heart Disease Other        Social History     Social History    Marital status:      Spouse name: N/A    Number of children: N/A    Years of education: N/A     Social History Main Topics    Smoking status: Current Every Day Smoker     Packs/day: 1.00    Smokeless tobacco: Never Used    Alcohol use 2.4 oz/week     4 Cans of beer per week    Drug use: No    Sexual activity: No     Other Topics Concern    None     Social History Narrative       Review of Systems   Eyes: Negative for blurred vision and double vision. Respiratory: Negative for shortness of breath and wheezing. Gastrointestinal: Negative for nausea and vomiting. Neurological: Negative for dizziness, tingling and headaches. Psychiatric/Behavioral: Positive for memory loss. The patient is nervous/anxious. The patient does not have insomnia. Remainder of comprehensive review is negative.      Physical Exam :    Visit Vitals    /80    Ht 6' 1\" (1.854 m)    Wt 79.8 kg (176 lb)    BMI 23.22 kg/m2       General: Well defined, nourished, and groomed individual in no acute distress.    Neck: Supple, nontender, no bruits, no pain with resistance to active range of motion.    Psych: Good mood and bright affect. NEUROLOGICAL EXAMINATION:    Mental Status: Alert and oriented to person, place, and not time. MMSE 20     Cranial Nerves:    II, III, IV, VI: Visual acuity grossly intact. Visual fields are normal.    Pupils are equal, round, and reactive to light and accommodation.    Extra-ocular movements are full and fluid. Fundoscopic exam was benign, no ptosis or nystagmus.    V-XII: Hearing is grossly intact. Facial features are symmetric, with normal sensation and strength. The palate rises symmetrically and the tongue protrudes midline. Sternocleidomastoids 5/5. Results for orders placed or performed in visit on 08/18/17   CBC WITH AUTOMATED DIFF   Result Value Ref Range    WBC 7.3 3.4 - 10.8 x10E3/uL    RBC 4.94 4.14 - 5.80 x10E6/uL    HGB 16.2 12.6 - 17.7 g/dL    HCT 47.4 37.5 - 51.0 %    MCV 96 79 - 97 fL    MCH 32.8 26.6 - 33.0 pg    MCHC 34.2 31.5 - 35.7 g/dL    RDW 13.6 12.3 - 15.4 %    PLATELET 797 175 - 292 x10E3/uL    NEUTROPHILS 51 %    Lymphocytes 38 %    MONOCYTES 7 %    EOSINOPHILS 3 %    BASOPHILS 1 %    ABS. NEUTROPHILS 3.8 1.4 - 7.0 x10E3/uL    Abs Lymphocytes 2.8 0.7 - 3.1 x10E3/uL    ABS. MONOCYTES 0.5 0.1 - 0.9 x10E3/uL    ABS. EOSINOPHILS 0.2 0.0 - 0.4 x10E3/uL    ABS. BASOPHILS 0.0 0.0 - 0.2 x10E3/uL    IMMATURE GRANULOCYTES 0 %    ABS. IMM.  GRANS. 0.0 0.0 - 0.1 E95G5/BE   METABOLIC PANEL, COMPREHENSIVE   Result Value Ref Range    Glucose 126 (H) 65 - 99 mg/dL    BUN 18 8 - 27 mg/dL    Creatinine 0.98 0.76 - 1.27 mg/dL    GFR est non-AA 79 >59 mL/min/1.73    GFR est AA 92 >59 mL/min/1.73    BUN/Creatinine ratio 18 10 - 24    Sodium 139 134 - 144 mmol/L    Potassium 4.7 3.5 - 5.2 mmol/L    Chloride 99 96 - 106 mmol/L    CO2 20 18 - 29 mmol/L    Calcium 10.0 8.6 - 10.2 mg/dL    Protein, total 6.7 6.0 - 8.5 g/dL    Albumin 4.6 3.6 - 4.8 g/dL    GLOBULIN, TOTAL 2.1 1.5 - 4.5 g/dL    A-G Ratio 2.2 1.2 - 2.2    Bilirubin, total 0.3 0.0 - 1.2 mg/dL    Alk. phosphatase 74 39 - 117 IU/L    AST (SGOT) 30 0 - 40 IU/L    ALT (SGPT) 22 0 - 44 IU/L   LIPID PANEL   Result Value Ref Range    Cholesterol, total 151 100 - 199 mg/dL    Triglyceride 99 0 - 149 mg/dL    HDL Cholesterol 53 >39 mg/dL    VLDL, calculated 20 5 - 40 mg/dL    LDL, calculated 78 0 - 99 mg/dL   TSH 3RD GENERATION   Result Value Ref Range    TSH 3.190 0.450 - 4.500 uIU/mL   PSA SCREENING (SCREENING)   Result Value Ref Range    Prostate Specific Ag 3.6 0.0 - 4.0 ng/mL   HEMOGLOBIN A1C WITH EAG   Result Value Ref Range    Hemoglobin A1c 5.9 (H) 4.8 - 5.6 %    Estimated average glucose 123 mg/dL   MICROALBUMIN, UR, RAND W/ MICROALBUMIN/CREA RATIO   Result Value Ref Range    Creatinine, urine 57.6 Not Estab. mg/dL    Microalbumin, urine <3.0 Not Estab. ug/mL    Microalb/Creat ratio (ug/mg creat.) <5.2 0.0 - 30.0 mg/g creat   CVD REPORT   Result Value Ref Range    INTERPRETATION Note    DIABETES PATIENT EDUCATION   Result Value Ref Range    PDF Image Not applicable        Orders Placed This Encounter    REFERRAL TO NEUROPSYCHOLOGY     Referral Priority:   Routine     Referral Type:   Consultation     Referral Reason:   Specialty Services Required     Referred to Provider:   Trever Raya PsyD    escitalopram oxalate (LEXAPRO) 10 mg tablet     Sig: Take 1 Tab by mouth daily. Dispense:  30 Tab     Refill:  5    DISCONTD: dextroamphetamine-amphetamine (ADDERALL) 20 mg tablet     Sig: Take 1 Tab (20 mg total) by mouth daily. Max Daily Amount: 20 mg     Dispense:  30 Tab     Refill:  0    DISCONTD: dextroamphetamine-amphetamine (ADDERALL) 20 mg tablet     Sig: Take 1 Tab (20 mg total) by mouth dailyEarliest Fill Date: 9/21/18. Max Daily Amount: 20 mg     Dispense:  30 Tab     Refill:  0    dextroamphetamine-amphetamine (ADDERALL) 20 mg tablet     Sig: Take 1 Tab (20 mg total) by mouth dailyEarliest Fill Date: 10/21/18. Max Daily Amount: 20 mg     Dispense:  30 Tab     Refill:  0       1. Alzheimer's dementia with behavioral disturbance, unspecified timing of dementia onset        Follow-up Disposition:  Return in about 2 months (around 10/21/2018). Worsening AD. Keep Aricept 10 mg and Namenda 10 mg BID For memory preservation. We discussed the need to keep mind and body active. Continue to challenge brain. Continue Adderall at 20 mg daily as this has helped his functioning. We discussed his symptoms further and with agitation, easy lose his cool we will add some Lexparo 10 mg daily to see if this can help with his mood, stress, anxiety. We discussed side effects, POC and they have no questions on this. Send to get re-tested to further evaluate and stage his AD. Call with changes. This note will not be viewable in [place]      Total time: 40 min   Counseling / coordination time: 30 min   > 50% counseling / coordination?: Yes re: medications, POC, disease process.

## 2018-08-21 NOTE — PROGRESS NOTES
Patient is here for follow up. Long term memory is still good, but short term is a little worse. He is forgetting every day things and getting confused easily.

## 2018-08-21 NOTE — PATIENT INSTRUCTIONS
[facility]   Statement to Patients  April 1, 2014      In an effort to ensure the large volume of patient prescription refills is processed in the most efficient and expeditious manner, we are asking our patients to assist us by calling your Pharmacy for all prescription refills, this will include also your  Mail Order Pharmacy. The pharmacy will contact our office electronically to continue the refill process. Please do not wait until the last minute to call your pharmacy. We need at least 48 hours (2days) to fill prescriptions. We also encourage you to call your pharmacy before going to  your prescription to make sure it is ready. With regard to controlled substance prescription refill requests (narcotic refills) that need to be picked up at our office, we ask your cooperation by providing us with at least 72 hours (3days) notice that you will need a refill. We will not refill narcotic prescription refill requests after 4:00pm on any weekday, Monday through Thursday, or after 2:00pm on Fridays, or on the weekends. We encourage everyone to explore another way of getting your prescription refill request processed using [employer], our patient web portal through our electronic medical record system. [employer] is an efficient and effective way to communicate your medication request directly to the office and  downloadable as an kevin on your smart phone . [employer] also features a review functionality that allows you to view your medication list as well as leave messages for your physician. Are you ready to get connected? If so please review the attatched instructions or speak to any of our staff to get you set up right away! Thank you so much for your cooperation. Should you have any questions please contact our Practice Administrator.     The Physicians and Staff,  [facility]
[phone]

## 2018-08-27 ENCOUNTER — OFFICE VISIT (OUTPATIENT)
Dept: NEUROLOGY | Age: 68
End: 2018-08-27

## 2018-08-27 DIAGNOSIS — F41.1 GENERALIZED ANXIETY DISORDER: ICD-10-CM

## 2018-08-27 DIAGNOSIS — R45.4 IRRITABLE: ICD-10-CM

## 2018-08-27 DIAGNOSIS — G30.9 ALZHEIMER'S DEMENTIA WITH BEHAVIORAL DISTURBANCE, UNSPECIFIED TIMING OF DEMENTIA ONSET: Primary | ICD-10-CM

## 2018-08-27 DIAGNOSIS — F10.11 HISTORY OF ALCOHOL ABUSE: ICD-10-CM

## 2018-08-27 DIAGNOSIS — F02.81 ALZHEIMER'S DEMENTIA WITH BEHAVIORAL DISTURBANCE, UNSPECIFIED TIMING OF DEMENTIA ONSET: Primary | ICD-10-CM

## 2018-08-27 NOTE — PROGRESS NOTES
[address] [facility],[address]  [address]   [place] [address] [facility] [address] [facility]    [phone] Office   [phone] Fax      Neuropsychology    Exam # 2    Initial Diagnostic Interview Note      Referral:  Cornelia Ford NP, Joey Borges NP    Naveen Adamson is a 76 y.o. right handed   male who was accompanied by his spouse to the initial clinical interview on 8/27/184 . Please refer to his medical records for details pertaining to his history. When I saw him last: aNveen Adamson is a 77 y.o.  right handed   male who was unaccompanied to the initial clinical interview on 1/5/17. Please refer to his medical records for details pertaining to his history. Briefly, the patient reported that he completed high school and is a prior service [employer]. Left school early, went into the [employer] about age 16, and took some courses. He obtained high school diploma. For about a year now that he is aware, he has noticed increased forgetfulness getting worse over time. Loss of memory. He has had periods where he feels buzzed out. Saw Flor Silvestre, did MMSE, and his recall was only 1/3. Either he misunderstood what he was supposed to do or just forgot. He doesn't know. He says he only remembered the first word. He does not recall specifics, thinks it was an activity. Periods of time when he feels aggravated, embarrassed by it. Doing things at home, he will forget his honey-do list.  Leona Bender melancholy about it. Lost $2000.00. Lost a small gun/pistol that he had, and took a month to find that. He forgets certain events. Couldn't remember if he has had any major known neurologic problems. Has gotten banged up in his head, didn't bleed or lose consciousness. This happened maybe a month ago? Has some pain, arthritis issues. Forgets what he's supposed to do. Repeats himself.   Says his driving is not a problem (but see Dr. Ruslan Saldaña notes copied below). Denied ever getting lost when driving or turned around. Denied accidents. He gets assistance with medications, and spouse has always done the finances. Feels off balance sometimes. Has had some falls. Appetite isn't too bad. Emotionally, he reports doing not bad, not like before. Not on any meds for mood. No counseling/psychiatrist currently. Sleep is an issue. Not sleeping in same bed with spouse. Will watch tv, nap, up and down at night.      History somewhat difficult to obtain, so I reviewed also Dr. Maddy Carrillo' notes, which are copied below.       From Dr. Maddy Carrillo: 51-year-old right-handed gentleman presents for evaluation today accompanied by his wife for evaluation of what he calls memory loss. He says he started to have trouble about 2-2.5 years ago and he believes things have gotten progressively worse. He says that his stepfather had dementia and he is seeing the same types of things and himself as he saw on his stepfather. He says he can remember long-term event such as things that occurred in [facility], [facility] within [facility] but he has difficulty remembering short-term things. He has been evaluated with [facility] and he describes having a Mini-Mental status exam specifically a mini cog and notes that he had difficulty with the item recall only recalling 1 out of 3 items. His wife tells me that he repeats the same question over and over. He will tell the same story over and over. He will forget conversations. She relates the example of them going somewhere and then discussing it but then him asking over and over where they were going. He continues to drive. No issues with accidents or near accidents. He was supposed to be over at a veterans event on the [address] [place] [address] on [facility]. His wife says that this is an area of town to go to a lot because there is a restaurant on [address] where they like to eat.  He was driving there and his wife actually put the directions into his phone for him as well and he was unable to find [facility]. He called a friend of his who was already there and still could not find his way. He also notes that in the middle of conversations he will forget what he is saying. He says that he misplaces items frequently as well. Terms of family history of dementia he does not know anything about his father's side of the family. He did not know his father at all. His mother as above was said to have some psychiatric issues. Essentially family history is not known in this regard. My diagnostic impressions then included: This patient generated an abnormal range Neuropsychological Evaluation with respect to neurocognitive functioning. In this regard, he is showing problems with mental status, verbal fluency, confrontation naming, sustained attention, processing speed, working memory, perceptual reasoning, verbal comprehension, dominant handed motor dexterity (a potentially lateralized issue for which neurologic correlation is indicated) auditory learning, auditory memory, and executive functioning. Casual language skills will serve to mask underlying cognitive deficits at times. Emotionally, the patient reported mild anxiety. He reports a history of alcohol abuse when younger as well.                             In my opinion, this profile is consistent with an evolving organic process that is currently at a mild to moderate level of severity. This is likely a combination of AD and vascular dementia. I do not see this as FTD. In addition to continued medical care, my recommendations include consideration for memory management medication. I also recommend psychiatric medication management for anxiety.     The patient should be encouraged to remain as mentally, socially, and physically active as possible.                             The patient's generated cognitive difficulties are significant to the degree whereby it is my opinion that he should not live independently without appropriate supervision for those domains with a heavy memory emphasis. This includes medication management supervision and supervision of financial dealings. We will need to monitor driving safety over time, and he should use a GPS device in the interim. I do not believe that he is competent to manage his own affairs at this time. Baseline now established. Follow up prn. Clinical correlation is, of course, indicated.                           I will discuss these findings with the patient and family when they follow up with me in the near future. A follow up Neuropsychological Evaluation is indicated on a prn basis.       DIAGNOSES:           Dementia -                Mild To Moderate                                              Anxiety - Mild       Since I saw him last, the patient reported that he is now on Aricept and Namenda. Spouse says that his short term memory has been declining, but patient says it's bad but not worse. He has been driving without issues. He thinks he is an excellent . Spouse says he is not an excellent , and that she goes with him when he is driving. Spouse notes that she has to repeat himself over and over. He will forget to take the trash out and will walk over and do something else. He puts things back in the wrong place. Difficulties coming up with words. She helps him remember meds and does the finances. He gets stressed and agitated on a daily basis and she has to continue to remind him for things and this frustrates her. He gets overwhelmed and feels over stimulated. Watches tv during the day. He is on Adderall which is helpful and he is more focused and gets things a bit done. The spouse and family say things are getting worse and he says he is no worse and no better from previous.  There have been no new neurologic issues and no major changes medically or psychosocially since I saw him last.  No current legal troubles. Sleep is good. Appetite is good. Balance is a bit wobbly. No falls. No counseling or psychiatrist.       Neuropsychological Mental Status Exam (NMSE):  Historian: Good  Praxis: No UE apraxia  R/L Orientation: Intact to self and to other  Dress: within normal limits   Weight: within normal limits   Appearance/Hygiene: within normal limits   Gait: within normal limits   Assistive Devices: Glasses  Mood: within normal limits   Affect: within normal limits   Comprehension: within normal limits   Thought Process:He is tangential today and redirection is helpful. Expressive Language: within normal limits   Receptive Language: within normal limits   Motor:  No cognitive or motor perseveration  ETOH: He has beer occasionally, he has a history of alcohol abuse, see previous report. Beer maybe 3 times a week  Tobacco: Cut back quite a bit and down to ten a day.   Counseling to quit completely given  Illicit: Denied  SI/HI: Denied  Psychosis: Denied  Insight: Within normal limits  Judgment: Within normal limits  Other Psych: He does get a bit frustrated with his spouse when she is discussing his memory decline      Past Medical History:   Diagnosis Date    [facility]ism ([facility]     CAD (coronary artery disease) 6/15/2010    DM (diabetes mellitus) ([facility] 6/15/2010    Falls     Heart disease     HTN (hypertension) 6/15/2010    Hypercholesteremia 6/15/2010    PTSD (post-traumatic stress disorder)     Skin cancer 6/15/2010    Smoker 6/15/2010    Snoring        Past Surgical History:   Procedure Laterality Date    CARDIAC SURG PROCEDURE UNLIST      stent       No Known Allergies    Family History   Problem Relation Age of Onset    Cancer Mother     Heart Disease Other        Social History   Substance Use Topics    Smoking status: Current Every Day Smoker     Packs/day: 1.00    Smokeless tobacco: Never Used    Alcohol use 2.4 oz/week     4 Cans of beer per week       Current Outpatient Prescriptions Medication Sig Dispense Refill    escitalopram oxalate (LEXAPRO) 10 mg tablet Take 1 Tab by mouth daily. 30 Tab 5    [START ON 10/21/2018] dextroamphetamine-amphetamine (ADDERALL) 20 mg tablet Take 1 Tab (20 mg total) by mouth dailyEarliest Fill Date: 10/21/18. Max Daily Amount: 20 mg 30 Tab 0    sildenafil, antihypertensive, (REVATIO) 20 mg tablet TK UP TO 5 TS PO AS DIRECTED  5    rosuvastatin (CRESTOR) 20 mg tablet Take 1 Tab by mouth daily. 90 Tab 3    niacin ER (NIASPAN) 500 mg tablet Take 1 Tab by mouth nightly. 90 Tab 3    metoprolol succinate (TOPROL XL) 50 mg XL tablet Take 1 Tab by mouth daily. 90 Tab 3    furosemide (LASIX) 20 mg tablet Take 1 Tab by mouth daily. 30 Tab 5    memantine (NAMENDA) 10 mg tablet TAKE 1 TABLET BY MOUTH TWICE DAILY 60 Tab 5    donepezil (ARICEPT) 10 mg tablet TAKE 1 TABLET BY MOUTH EVERY NIGHT 30 Tab 5    DOCOSAHEXANOIC ACID/EPA (FISH OIL PO) Take 1,200 mg by mouth daily.  PV W-O LAZARUS/FERROUS FUMARATE/FA (M-VIT PO) Take  by mouth.  POTASSIUM PO Take 550 mg by mouth.  OMEGA-3 FATTY ACIDS (OMEGA 3 PO) Take  by mouth.  aspirin (ASPIRIN) 325 mg tablet Take 325 mg by mouth daily. Plan:  Obtain authorization for testing from insurance company. Report to follow once testing, scoring, and interpretation completed. ? Organic based neurocognitive issues versus mood disorder or combination of same. ? Problems organic, functional, or both? This note will not be viewable in [address] 76year old last seen last year and diagnosed with dementia He says memory is stable family notes that it is declining and he is more overwhelmed and irritable and anxious at times. He is on medication for memory. Also put on medication for attention which has helped a lot for attention but memory decline continues. Eval for changes in dementia status versus mood.

## 2018-09-18 ENCOUNTER — OFFICE VISIT (OUTPATIENT)
Dept: NEUROLOGY | Age: 68
End: 2018-09-18

## 2018-09-18 DIAGNOSIS — G30.0 EARLY ONSET ALZHEIMER'S DEMENTIA WITHOUT BEHAVIORAL DISTURBANCE (HCC): Primary | ICD-10-CM

## 2018-09-18 DIAGNOSIS — F41.1 GENERALIZED ANXIETY DISORDER: ICD-10-CM

## 2018-09-18 DIAGNOSIS — F10.11 HISTORY OF ALCOHOL ABUSE: ICD-10-CM

## 2018-09-18 DIAGNOSIS — F02.80 EARLY ONSET ALZHEIMER'S DEMENTIA WITHOUT BEHAVIORAL DISTURBANCE (HCC): Primary | ICD-10-CM

## 2018-09-18 DIAGNOSIS — R45.4 IRRITABLE: ICD-10-CM

## 2018-09-19 NOTE — PROGRESS NOTES
[address] [facility],[address]  [address]   [place] [address] [facility] [address] [facility]   [phone] Office   [phone] Fax      Neuropsychological Evaluation Report    Exam # 2  Referral:  Kendall Davila, RAYMOND, Jag Brannon NP    José Miguel Sommer is a 76 y.o. right handed   male who was accompanied by his spouse to the initial clinical interview on 8/27/184 . Please refer to his medical records for details pertaining to his history. When I saw him last: José Miguel Sommer is a 77 y.o.  right handed   male who was unaccompanied to the initial clinical interview on 1/5/17. Please refer to his medical records for details pertaining to his history. Briefly, the patient reported that he completed high school and is a prior service [employer]. Left school early, went into the [employer] about age 16, and took some courses. He obtained high school diploma. For about a year now that he is aware, he has noticed increased forgetfulness getting worse over time. Loss of memory. He has had periods where he feels buzzed out. Saw Joanne Cameron, did MMSE, and his recall was only 1/3. Either he misunderstood what he was supposed to do or just forgot. He doesn't know. He says he only remembered the first word. He does not recall specifics, thinks it was an activity. Periods of time when he feels aggravated, embarrassed by it. Doing things at home, he will forget his honey-do list.  Leighton Antonio melancholy about it. Lost $2000.00. Lost a small gun/pistol that he had, and took a month to find that. He forgets certain events. Couldn't remember if he has had any major known neurologic problems. Has gotten banged up in his head, didn't bleed or lose consciousness. This happened maybe a month ago? Has some pain, arthritis issues. Forgets what he's supposed to do. Repeats himself.   Says his driving is not a problem (but see Dr. Talia Horowitz notes copied below). Denied ever getting lost when driving or turned around. Denied accidents. He gets assistance with medications, and spouse has always done the finances. Feels off balance sometimes. Has had some falls. Appetite isn't too bad. Emotionally, he reports doing not bad, not like before. Not on any meds for mood. No counseling/psychiatrist currently. Sleep is an issue. Not sleeping in same bed with spouse. Will watch tv, nap, up and down at night.      History somewhat difficult to obtain, so I reviewed also Dr. Bradley Dennison' notes, which are copied below.       From Dr. Bradley Dennison: 31-year-old right-handed gentleman presents for evaluation today accompanied by his wife for evaluation of what he calls memory loss. He says he started to have trouble about 2-2.5 years ago and he believes things have gotten progressively worse. He says that his stepfather had dementia and he is seeing the same types of things and himself as he saw on his stepfather. He says he can remember long-term event such as things that occurred in [facility] but he has difficulty remembering short-term things. He has been evaluated with Bashir Solis and he describes having a Mini-Mental status exam specifically a mini cog and notes that he had difficulty with the item recall only recalling 1 out of 3 items. His wife tells me that he repeats the same question over and over. He will tell the same story over and over. He will forget conversations. She relates the example of them going somewhere and then discussing it but then him asking over and over where they were going. He continues to drive. No issues with accidents or near accidents. He was supposed to be over at a veterans event on the [address] [place] [address] on [facility]. His wife says that this is an area of town to go to a lot because there is a restaurant on [address] where they like to eat.  He was driving there and his wife actually put the directions into his phone for him as well and he was unable to find [facility]. He called a friend of his who was already there and still could not find his way. He also notes that in the middle of conversations he will forget what he is saying. He says that he misplaces items frequently as well. Terms of family history of dementia he does not know anything about his father's side of the family. He did not know his father at all. His mother as above was said to have some psychiatric issues. Essentially family history is not known in this regard. My diagnostic impressions then included: This patient generated an abnormal range Neuropsychological Evaluation with respect to neurocognitive functioning. In this regard, he is showing problems with mental status, verbal fluency, confrontation naming, sustained attention, processing speed, working memory, perceptual reasoning, verbal comprehension, dominant handed motor dexterity (a potentially lateralized issue for which neurologic correlation is indicated) auditory learning, auditory memory, and executive functioning. Casual language skills will serve to mask underlying cognitive deficits at times. Emotionally, the patient reported mild anxiety. He reports a history of alcohol abuse when younger as well.                             In my opinion, this profile is consistent with an evolving organic process that is currently at a mild to moderate level of severity. This is likely a combination of AD and vascular dementia. I do not see this as FTD. In addition to continued medical care, my recommendations include consideration for memory management medication. I also recommend psychiatric medication management for anxiety.     The patient should be encouraged to remain as mentally, socially, and physically active as possible.                             The patient's generated cognitive difficulties are significant to the degree whereby it is my opinion that he should not live independently without appropriate supervision for those domains with a heavy memory emphasis. This includes medication management supervision and supervision of financial dealings. We will need to monitor driving safety over time, and he should use a GPS device in the interim. I do not believe that he is competent to manage his own affairs at this time. Baseline now established. Follow up prn. Clinical correlation is, of course, indicated.                           I will discuss these findings with the patient and family when they follow up with me in the near future. A follow up Neuropsychological Evaluation is indicated on a prn basis.       DIAGNOSES:           Dementia -                Mild To Moderate                                              Anxiety - Mild       Since I saw him last, the patient reported that he is now on Aricept and Namenda. Spouse says that his short term memory has been declining, but patient says it's bad but not worse. He has been driving without issues. He thinks he is an excellent . Spouse says he is not an excellent , and that she goes with him when he is driving. Spouse notes that she has to repeat himself over and over. He will forget to take the trash out and will walk over and do something else. He puts things back in the wrong place. Difficulties coming up with words. She helps him remember meds and does the finances. He gets stressed and agitated on a daily basis and she has to continue to remind him for things and this frustrates her. He gets overwhelmed and feels over stimulated. Watches tv during the day. He is on Adderall which is helpful and he is more focused and gets things a bit done. The spouse and family say things are getting worse and he says he is no worse and no better from previous. There have been no new neurologic issues and no major changes medically or psychosocially since I saw him last.  No current legal troubles. Sleep is good. Appetite is good. Balance is a bit wobbly. No falls. No counseling or psychiatrist.       Neuropsychological Mental Status Exam (NMSE):  Historian: Good  Praxis: No UE apraxia  R/L Orientation: Intact to self and to other  Dress: within normal limits   Weight: within normal limits   Appearance/Hygiene: within normal limits   Gait: within normal limits   Assistive Devices: Glasses  Mood: within normal limits   Affect: within normal limits   Comprehension: within normal limits   Thought Process:He is tangential today and redirection is helpful. Expressive Language: within normal limits   Receptive Language: within normal limits   Motor:  No cognitive or motor perseveration  ETOH: He has beer occasionally, he has a history of alcohol abuse, see previous report. Beer maybe 3 times a week  Tobacco: Cut back quite a bit and down to ten a day.   Counseling to quit completely given  Illicit: Denied  SI/HI: Denied  Psychosis: Denied  Insight: Within normal limits  Judgment: Within normal limits  Other Psych: He does get a bit frustrated with his spouse when she is discussing his memory decline      Past Medical History:   Diagnosis Date    [facility]ism ([facility]     CAD (coronary artery disease) 6/15/2010    DM (diabetes mellitus) ([facility] 6/15/2010    Falls     Heart disease     HTN (hypertension) 6/15/2010    Hypercholesteremia 6/15/2010    PTSD (post-traumatic stress disorder)     Skin cancer 6/15/2010    Smoker 6/15/2010    Snoring        Past Surgical History:   Procedure Laterality Date    CARDIAC SURG PROCEDURE UNLIST      stent       No Known Allergies    Family History   Problem Relation Age of Onset    Cancer Mother     Heart Disease Other        Social History   Substance Use Topics    Smoking status: Current Every Day Smoker     Packs/day: 1.00    Smokeless tobacco: Never Used    Alcohol use 2.4 oz/week     4 Cans of beer per week       Current Outpatient Prescriptions   Medication Sig Dispense Refill    escitalopram oxalate (LEXAPRO) 10 mg tablet Take 1 Tab by mouth daily. 30 Tab 5    [START ON 10/21/2018] dextroamphetamine-amphetamine (ADDERALL) 20 mg tablet Take 1 Tab (20 mg total) by mouth dailyEarliest Fill Date: 10/21/18. Max Daily Amount: 20 mg 30 Tab 0    sildenafil, antihypertensive, (REVATIO) 20 mg tablet TK UP TO 5 TS PO AS DIRECTED  5    rosuvastatin (CRESTOR) 20 mg tablet Take 1 Tab by mouth daily. 90 Tab 3    niacin ER (NIASPAN) 500 mg tablet Take 1 Tab by mouth nightly. 90 Tab 3    metoprolol succinate (TOPROL XL) 50 mg XL tablet Take 1 Tab by mouth daily. 90 Tab 3    furosemide (LASIX) 20 mg tablet Take 1 Tab by mouth daily. 30 Tab 5    memantine (NAMENDA) 10 mg tablet TAKE 1 TABLET BY MOUTH TWICE DAILY 60 Tab 5    donepezil (ARICEPT) 10 mg tablet TAKE 1 TABLET BY MOUTH EVERY NIGHT 30 Tab 5    DOCOSAHEXANOIC ACID/EPA (FISH OIL PO) Take 1,200 mg by mouth daily.  PV W-O LAZARUS/FERROUS FUMARATE/FA (M-VIT PO) Take  by mouth.  POTASSIUM PO Take 550 mg by mouth.  OMEGA-3 FATTY ACIDS (OMEGA 3 PO) Take  by mouth.  aspirin (ASPIRIN) 325 mg tablet Take 325 mg by mouth daily. Plan:  Obtain authorization for testing from insurance company. Report to follow once testing, scoring, and interpretation completed. ? Organic based neurocognitive issues versus mood disorder or combination of same. ? Problems organic, functional, or both? This note will not be viewable in [address] 76year old last seen last year and diagnosed with dementia He says memory is stable family notes that it is declining and he is more overwhelmed and irritable and anxious at times. He is on medication for memory. Also put on medication for attention which has helped a lot for attention but memory decline continues. Eval for changes in dementia status versus mood.       Neuropsychological Test Results  Patient Testing 9/18/18 Report Completed 9/19/18  A Psychometrist Assisted w/ portions of this evaluation while under my direct  supervision    The following evaluation procedures/tests were administered:      Neuropsychologist Administered/Interpreted:  Neuropsychological Mental Status Exam, Revised Memory & Behavior Checklist,  Mini Mental Status Exam, Clock Drawing Test, Test Of Premorbid Functioning, Elise-Melzack Pain Questionnaire, History Taking  & Clinical Interview With The Patient, Additional History Taking w/ The Patient's Spouse, ABAS-3, CASE, Review Of Available Records. Psychometrist Administered under Neuropsychologist Supervision & Neuropsychologist Interpreted:  Verbal Fluency Tests, Emerson & Emerson - Revised, Trailmaking Test Parts A & B, Wechsler Adult Intelligence Scale - IV, John Continuous Performance Test - III, [place] [employer] - 3, Grooved Pegboard, Garza Depression Inventory - II, Garza Anxiety Inventory, Personality Assessment Inventory. Test Findings:  Test Findings:  Note:  The patients raw data have been compared with currently available norms which include demographic corrections for age, gender, and/or education. Sometimes, the patients scores are compared to demographically similar individuals as close to the patients age, education level, etc., as possible. \"Average\" is viewed as being +/- 1 standard deviation (SD) from the stated mean for a particular test score. \"Low average\" is viewed as being between 1 and 2 SD below the mean, and above average is viewed as being 1 and 2 SD above the mean. Scores falling in the borderline range (between 1-1/2 and 2 SD below the mean) are viewed with particular attention as to whether they are normal or abnormal neurocognitive test scores. Other methods of inference in analyzing the test data are also utilized, including the pattern and range of scores in the profile, bilateral motor functions, and the presence, if any, of pathognomonic signs.         Behaviorally, the patient was friendly and cooperative and appeared motivated to perform well during this examination. Testing took quite an extended period of time. Within this context, the results of this evaluation are viewed as a valid reflection of the patients actual neurocognitive and emotional status. His MMSE score of 11/30 correct was impaired. In this regard, he was not oriented to season, Monday, day, date, county, city, building, or floor. Registration of three words was 2/3 correct on Trial 2. Backward spelling was 0/5 correct. Recall for three words after a brief delay was 1/3 correct. Naming was 1/2 correct. Repetition was impaired. Three step command was 2/3 correct. Reading was impaired. Clock drawing was markedly impaired. His structured word list fluency, as assessed by the FAS Test, was within the moderately to severely impaired range with a T score of 23. Category fluency was within the severely impaired range with a T score of 11. Confrontation naming ability, as assessed by the Emerson & Emerson - Revised, was within the severely impaired range at 12/60 correct (T = 18) This pattern of performance is indicative of a patient who is at increased risk for day-to-day problems with verbal fluency and confrontation naming. .       The patient was administered the [facility] Continuous Performance Test - III,a computer administered test of sustained attention, and review of the subscales within this instrument revealed severe concerns for inattentiveness without impulsivity. This pattern of performance is indicative of a patient who is at increased risk for day-to-day problems with sustained visual attention/concentration. The patient is showing problems with working memory capacity (0.3rd %ile) and processing speed (<0.1st %ile) on the WAIS-IV. His Verbal Comprehension Index score of 50 was within the extremely low range. His Perceptual Reasoning Index score of 73 was borderline .   These scores reflect a decline in functioning based on an assessment of premorbid functioning. The patient was administered the [place] Verbal Learning Test  - 3 and generated an impaired range (and flat) learning curve over five repeated auditory word list learning trials. An interference trial was within the normal range. Free and cued, short and long delayed recall were all impaired. Recognition recall was impaired, as was his forced choice recall. No concern for malingering, however. This pattern of performance is indicative of a patient who is at increased risk for day-to-day problems with auditory learning and memory. Simple timed visual motor sequencing (Trailmaking Test Part A) was within the severely impaired range with a T score of 18. His performance on a similar, but more complex task of timed visual motor sequencing (Trailmaking Test Part B) was within the severely impaired range with a T score of 18. This latter test was discontinued. Taken together, this pattern of performance is indicative of a patient who is at increased risk for day-to-day problems with executive functioning. Fine motor dexterity was within the moderately to severely impaired range bilaterally. This pattern of performance is indicative of a patient who is at increased risk for day-to-day problems with bilateral motor dexterity. The patient rated his current level of pain as \"0/5- No Pain\" on the Elise-Melzack Pain Questionnaire. His Garza Depression Inventory- II score of 3 was within the normal range. His Garza Anxiety Inventory score of 0 reflected minimal anxiety. The patient was administered the Personality Assessment Inventory and generated an invalid profile for interpretation due to a high level of patient response inconsistencies.        The spouse completed the ABAS-3 and reported concerns regarding the patient's general adaptive skills (4th %ile), conceptual skills (2nd %ile), social skills (7th %ile), and practical skills (5th %ile). On the CASE, the spouse reported clinically significant concerns regarding the patient's cognitive functioning as well as concern for rosa/irritability. Impressions & Recommendations: This patient again generated an abnormal range Neuropsychological Evaluation with respect to neurocognitive functioning. In this regard, he is showing problems with mental status, verbal fluency, confrontation naming, sustained attention, processing speed, working memory, perceptual reasoning, verbal comprehension, auditory learning, auditory memory, bilateral fine motor dexterity, and executive functioning. Casual language skills will serve to mask underlying cognitive deficits at times. Emotionally, the patient denied clinically significant psychopathology. The spouse reports that he is more irritable of late. He continues to consume alcohol but to a much less extent than previous. He gets overwhelmed and feels over stimulated at times. There is continued support for anxiety. At his last examination, dementia was identified and classified as within the mild to moderate range. It has progressed, and is now fully within the moderate range and clearly progressing. He is already on medications for memory and a medication for attention. I suggest continued treatment for the neurocognitive deficits and consider treatment for anxiety also. Monitor for aggression/frustration issues. He does not have an organic based impulsivity issue, and this is good news in that regard. He lacks insight and I again find him NOT competent to make medical decisions, financial decisions, to vote, to , or to own a firearm. His sustained attention is severely impaired, and based on the trend of decline I strongly opine that he should not drive. He needs supervision for day-to-day household safety, nutritional/meal preparation, finance management, and medication management.   He will likely require increased supervision over time and consider in-home health or transfer to assisted living should the spouse no longer be able to match his increased need for supervision over time. Consider respite care for her also. We now have baseline and updated data on him, and his prognosis is unfortunately guarded at best.  At the same time, he should be encouraged to remain as mentally, socially, and physically active as possible. Follow up in one year, or prn. Clinical correlation is, of course, indicated. I will discuss these findings with the patient and family when they follow up with me in the near future. A follow up Neuropsychological Evaluation is indicated on a prn basis. DIAGNOSES: Dementia - Moderate (Progressing)       Anxiety - Likely Mild    Alcohol Abuse In partial remission     The above information is based upon information currently available to me. If there is any additional information of which I am currently unaware, I would be more than happy to review it upon having it made available to me. Thank you for the opportunity to see this interesting individual.     Sincerely,       Mely Morales. Michelle Kumar PsyD, EdS      Attachments:  IQ Test Results (In Media Section Of This EMR)    Cc: Elen Jarrell NP, Dr. Susana Roca    2 units -35885- 1.75 hours Record review. Review of history provided by patient. Review of collaborative information. Testing by Clinician. Review of raw data. Scoring. Report writing of individual tests administered by Clinician. Integration of individual tests administered by psychometrist (that were previously reported and billed under psychometry code below) with testing by clinician and review of records/history/collaborative information. Case Conceptualization, Report writing. Coordination Of Care. 4 units  -09290 -  5.75 hours Psychometrist test prep, administration, and scoring under clinician's direct supervision.   Clinical interpretation of individual tests administered by psychometrist .  Clinician report of individual tests administered by psychometrist.    \"Unit\" is defined by CPT/National Guidelines (31 - 60 minutes). Integral services including scoring of raw data, data interpretation, case conceptualization, report writing etcetera were initiated after the patient finished testing/raw data collected and was completed on the date the report was signed.

## 2018-10-16 ENCOUNTER — OFFICE VISIT (OUTPATIENT)
Dept: NEUROLOGY | Age: 68
End: 2018-10-16

## 2018-10-16 VITALS
WEIGHT: 176 LBS | BODY MASS INDEX: 23.33 KG/M2 | SYSTOLIC BLOOD PRESSURE: 106 MMHG | DIASTOLIC BLOOD PRESSURE: 72 MMHG | HEIGHT: 73 IN

## 2018-10-16 DIAGNOSIS — G30.9 ALZHEIMER'S DEMENTIA WITH BEHAVIORAL DISTURBANCE, UNSPECIFIED TIMING OF DEMENTIA ONSET: Primary | ICD-10-CM

## 2018-10-16 DIAGNOSIS — F02.81 ALZHEIMER'S DEMENTIA WITH BEHAVIORAL DISTURBANCE, UNSPECIFIED TIMING OF DEMENTIA ONSET: Primary | ICD-10-CM

## 2018-10-16 RX ORDER — ESCITALOPRAM OXALATE 20 MG/1
20 TABLET ORAL DAILY
Qty: 30 TAB | Refills: 5 | Status: SHIPPED | OUTPATIENT
Start: 2018-10-16 | End: 2019-02-25 | Stop reason: SDUPTHER

## 2018-10-16 RX ORDER — DEXTROAMPHETAMINE SACCHARATE, AMPHETAMINE ASPARTATE, DEXTROAMPHETAMINE SULFATE AND AMPHETAMINE SULFATE 5; 5; 5; 5 MG/1; MG/1; MG/1; MG/1
40 TABLET ORAL DAILY
Qty: 60 TAB | Refills: 0 | Status: SHIPPED | OUTPATIENT
Start: 2018-11-15 | End: 2018-11-26 | Stop reason: SDUPTHER

## 2018-10-16 RX ORDER — DEXTROAMPHETAMINE SACCHARATE, AMPHETAMINE ASPARTATE, DEXTROAMPHETAMINE SULFATE AND AMPHETAMINE SULFATE 5; 5; 5; 5 MG/1; MG/1; MG/1; MG/1
40 TABLET ORAL DAILY
Qty: 60 TAB | Refills: 0 | Status: SHIPPED | OUTPATIENT
Start: 2018-10-16 | End: 2018-10-16 | Stop reason: SDUPTHER

## 2018-10-16 NOTE — PATIENT INSTRUCTIONS
[facility]   Statement to Patients  April 1, 2014      In an effort to ensure the large volume of patient prescription refills is processed in the most efficient and expeditious manner, we are asking our patients to assist us by calling your Pharmacy for all prescription refills, this will include also your  Mail Order Pharmacy. The pharmacy will contact our office electronically to continue the refill process. Please do not wait until the last minute to call your pharmacy. We need at least 48 hours (2days) to fill prescriptions. We also encourage you to call your pharmacy before going to  your prescription to make sure it is ready. With regard to controlled substance prescription refill requests (narcotic refills) that need to be picked up at our office, we ask your cooperation by providing us with at least 72 hours (3days) notice that you will need a refill. We will not refill narcotic prescription refill requests after 4:00pm on any weekday, Monday through Thursday, or after 2:00pm on Fridays, or on the weekends. We encourage everyone to explore another way of getting your prescription refill request processed using [employer], our patient web portal through our electronic medical record system. [employer] is an efficient and effective way to communicate your medication request directly to the office and  downloadable as an kevin on your smart phone . [employer] also features a review functionality that allows you to view your medication list as well as leave messages for your physician. Are you ready to get connected? If so please review the attatched instructions or speak to any of our staff to get you set up right away! Thank you so much for your cooperation. Should you have any questions please contact our Practice Administrator.     The Physicians and Staff,  [facility]

## 2018-10-16 NOTE — PROGRESS NOTES
Edmundo Alpers is a 76 y.o. male who presents with the following  Chief Complaint   Patient presents with    Follow-up       HPI Patient comes in with wife for a follow up for Dementia, anxiety and go over neuropsych testing. Currently  on Aricept 10 mg and namenda 10 mg BID and doing well with this. Feels his long term memory has been the same but his short term is getting worse still. She has to tell him things multiple times, will forget to take out trash will actually walk over it to do something else. Short term giving an example of he will get something out of the kitchen and will put it back in the wrong place. Wife will have to repeat herself, ask same questions. She does help him remember the medications day to day when he forgets even though they are on his bedside table. He will get stressed, agitated on a daily basis with her continuing to remind him what to do and day to day has trouble with feeling overwhelmed, over stimulated. Started Lexapro 10 mg daily and this has not changed anything really per the wife report.  Adderall 20 mg has really helped him focus better, concentrate, get things done per his wife but always looking for more symptom management. Overall she and family think he has gotten worse day to day and his overall short term memory has suffered. No Known Allergies    Current Outpatient Prescriptions   Medication Sig    escitalopram oxalate (LEXAPRO) 20 mg tablet Take 1 Tab by mouth daily.  [START ON 11/15/2018] dextroamphetamine-amphetamine (ADDERALL) 20 mg tablet Take 2 Tabs (40 mg total) by mouth dailyEarliest Fill Date: 11/15/18. Max Daily Amount: 40 mg    rosuvastatin (CRESTOR) 20 mg tablet Take 1 Tab by mouth daily.  niacin ER (NIASPAN) 500 mg tablet Take 1 Tab by mouth nightly.  metoprolol succinate (TOPROL XL) 50 mg XL tablet Take 1 Tab by mouth daily.  furosemide (LASIX) 20 mg tablet Take 1 Tab by mouth daily.     memantine (NAMENDA) 10 mg tablet TAKE 1 TABLET BY MOUTH TWICE DAILY    donepezil (ARICEPT) 10 mg tablet TAKE 1 TABLET BY MOUTH EVERY NIGHT    DOCOSAHEXANOIC ACID/EPA (FISH OIL PO) Take 1,200 mg by mouth daily.  PV W-O LAZARUS/FERROUS FUMARATE/FA (M-VIT PO) Take  by mouth.  POTASSIUM PO Take 550 mg by mouth.  OMEGA-3 FATTY ACIDS (OMEGA 3 PO) Take  by mouth.  aspirin (ASPIRIN) 325 mg tablet Take 325 mg by mouth daily.  sildenafil, antihypertensive, (REVATIO) 20 mg tablet TK UP TO 5 TS PO AS DIRECTED     No current facility-administered medications for this visit. History   Smoking Status    Current Every Day Smoker    Packs/day: 1.00   Smokeless Tobacco    Never Used       Past Medical History:   Diagnosis Date    [facility]ism ([facility]     CAD (coronary artery disease) 6/15/2010    DM (diabetes mellitus) ([facility] 6/15/2010    Falls     Heart disease     HTN (hypertension) 6/15/2010    Hypercholesteremia 6/15/2010    PTSD (post-traumatic stress disorder)     Skin cancer 6/15/2010    Smoker 6/15/2010    Snoring        Past Surgical History:   Procedure Laterality Date    CARDIAC SURG PROCEDURE UNLIST      stent       Family History   Problem Relation Age of Onset    Cancer Mother     Heart Disease Other        Social History     Social History    Marital status:      Spouse name: N/A    Number of children: N/A    Years of education: N/A     Social History Main Topics    Smoking status: Current Every Day Smoker     Packs/day: 1.00    Smokeless tobacco: Never Used    Alcohol use 2.4 oz/week     4 Cans of beer per week    Drug use: No    Sexual activity: No     Other Topics Concern    None     Social History Narrative       Review of Systems   Eyes: Negative for blurred vision and double vision. Respiratory: Negative for shortness of breath and wheezing. Gastrointestinal: Negative for nausea and vomiting. Neurological: Negative for dizziness, tingling and headaches.    Psychiatric/Behavioral: Positive for memory loss. Negative for depression, hallucinations and substance abuse. The patient is nervous/anxious. The patient does not have insomnia. Remainder of comprehensive review is negative. Physical Exam :    Visit Vitals    /72    Ht 6' 1\" (1.854 m)    Wt 79.8 kg (176 lb)    BMI 23.22 kg/m2     mpressions & Recommendations: This patient again generated an abnormal range Neuropsychological Evaluation with respect to neurocognitive functioning. In this regard, he is showing problems with mental status, verbal fluency, confrontation naming, sustained attention, processing speed, working memory, perceptual reasoning, verbal comprehension, auditory learning, auditory memory, bilateral fine motor dexterity, and executive functioning. Casual language skills will serve to mask underlying cognitive deficits at times. Emotionally, the patient denied clinically significant psychopathology. The spouse reports that he is more irritable of late. He continues to consume alcohol but to a much less extent than previous. He gets overwhelmed and feels over stimulated at times. There is continued support for anxiety.                           At his last examination, dementia was identified and classified as within the mild to moderate range. It has progressed, and is now fully within the moderate range and clearly progressing. He is already on medications for memory and a medication for attention. I suggest continued treatment for the neurocognitive deficits and consider treatment for anxiety also. Monitor for aggression/frustration issues. He does not have an organic based impulsivity issue, and this is good news in that regard. He lacks insight and I again find him NOT competent to make medical decisions, financial decisions, to vote, to , or to own a firearm. His sustained attention is severely impaired, and based on the trend of decline I strongly opine that he should not drive.   He needs supervision for day-to-day household safety, nutritional/meal preparation, finance management, and medication management. He will likely require increased supervision over time and consider in-home health or transfer to assisted living should the spouse no longer be able to match his increased need for supervision over time. Consider respite care for her also. We now have baseline and updated data on him, and his prognosis is unfortunately guarded at best.  At the same time, he should be encouraged to remain as mentally, socially, and physically active as possible. Follow up in one year, or prn. Clinical correlation is, of course, indicated.                           I will discuss these findings with the patient and family when they follow up with me in the near future. A follow up Neuropsychological Evaluation is indicated on a prn basis.       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 14 - 5.80 x10E6/uL    HGB 14.7 13.0 - 17.7 g/dL    HCT 44.3 37.5 - 51.0 %    MCV 98 (H) 79 - 97 fL    MCH 32.7 26.6 - 33.0 pg    MCHC 33.2 31.5 - 35.7 g/dL    RDW 13.7 12.3 - 15.4 %    PLATELET 389 691 - 872 x10E3/uL    NEUTROPHILS 46 Not Estab. %    Lymphocytes 44 Not Estab. %    MONOCYTES 7 Not Estab. %    EOSINOPHILS 3 Not Estab. %    BASOPHILS 0 Not Estab. %    ABS. NEUTROPHILS 3.5 1.4 - 7.0 x10E3/uL    Abs Lymphocytes 3.4 (H) 0.7 - 3.1 x10E3/uL    ABS. MONOCYTES 0.6 0.1 - 0.9 x10E3/uL    ABS. EOSINOPHILS 0.2 0.0 - 0.4 x10E3/uL    ABS. BASOPHILS 0.0 0.0 - 0.2 x10E3/uL    IMMATURE GRANULOCYTES 0 Not Estab. %    ABS. IMM.  GRANS. 0.0 0.0 - 0.1 x10E3/uL   LIPID PANEL   Result Value Ref Range    Cholesterol, total 171 100 - 199 mg/dL    Triglyceride 78 0 - 149 mg/dL    HDL Cholesterol 72 >39 mg/dL    VLDL, calculated 16 5 - 40 mg/dL    LDL, calculated 83 0 - 99 mg/dL   METABOLIC PANEL, COMPREHENSIVE   Result Value Ref Range    Glucose 93 65 - 99 mg/dL    BUN 24 8 - 27 mg/dL    Creatinine 0.95 0.76 - 1.27 mg/dL    GFR est non-AA 82 >59 mL/min/1.73    GFR est AA 95 >59 mL/min/1.73    BUN/Creatinine ratio 25 (H) 10 - 24    Sodium 141 134 - 144 mmol/L    Potassium 4.3 3.5 - 5.2 mmol/L    Chloride 103 96 - 106 mmol/L    CO2 24 20 - 29 mmol/L    Calcium 10.1 8.6 - 10.2 mg/dL    Protein, total 7.3 6.0 - 8.5 g/dL    Albumin 4.8 3.6 - 4.8 g/dL    GLOBULIN, TOTAL 2.5 1.5 - 4.5 g/dL    A-G Ratio 1.9 1.2 - 2.2    Bilirubin, total 0.2 0.0 - 1.2 mg/dL    Alk. phosphatase 70 39 - 117 IU/L    AST (SGOT) 36 0 - 40 IU/L    ALT (SGPT) 23 0 - 44 IU/L   TSH 3RD GENERATION   Result Value Ref Range    TSH 3.010 0.450 - 4.500 uIU/mL   PSA, DIAGNOSTIC (PROSTATE SPECIFIC AG)   Result Value Ref Range    Prostate Specific Ag 3.5 0.0 - 4.0 ng/mL   HEMOGLOBIN A1C WITH EAG   Result Value Ref Range    Hemoglobin A1c 6.2 (H) 4.8 - 5.6 %    Estimated average glucose 131 mg/dL   MICROALBUMIN, UR, RAND W/ MICROALB/CREAT RATIO   Result Value Ref Range    Creatinine, urine 123.9 Not Estab. mg/dL    Microalbumin, urine 3.9 Not Estab. ug/mL    Microalb/Creat ratio (ug/mg creat.) 3.1 0.0 - 30.0 mg/g creat   CVD REPORT   Result Value Ref Range    INTERPRETATION Note    DIABETES PATIENT EDUCATION   Result Value Ref Range    PDF Image Not applicable        Orders Placed This Encounter    escitalopram oxalate (LEXAPRO) 20 mg tablet     Sig: Take 1 Tab by mouth daily. Dispense:  30 Tab     Refill:  5    DISCONTD: dextroamphetamine-amphetamine (ADDERALL) 20 mg tablet     Sig: Take 2 Tabs (40 mg total) by mouth daily.   Max Daily Amount: 40 mg     Dispense:  60 Tab     Refill:  0    dextroamphetamine-amphetamine (ADDERALL) 20 mg tablet     Sig: Take 2 Tabs (40 mg total) by mouth dailyEarliest Fill Date: 11/15/18. Max Daily Amount: 40 mg     Dispense:  60 Tab     Refill:  0       1. Alzheimer's dementia with behavioral disturbance, unspecified timing of dementia onset        Follow-up Disposition:  Return in about 6 weeks (around 11/27/2018). Neuropsych results as seen above. We discussed these in full and they have no questions. We discussed further treatment. Increase Lexapro to 20 mg and see if this can help day to day agitation, anxiety. Increase Adderall to 40 mg daily to see if this can help ADHD, processing speed. We discussed side effects and they understand. Continue to keep the brain active. We discussed counseling at [facility] and will get them in touch to be evaluated further. Call with any  Changes.          This note will not be viewable in [facility]

## 2018-10-16 NOTE — MR AVS SNAPSHOT
Kiet Frankel 
 
 
 [place] [address] [facility] [address] [address] [phone] Patient: Ryland Bowles Sr. MRN: [id number] DAINA:[birth year] Visit Information Date & Time Provider Department Dept. Phone Encounter #  
 10/16/2018 10:00 AM Mandy Nagy NP [facility] [phone] [id number] Follow-up Instructions Return in about 6 weeks (around 11/27/2018). Your Appointments 11/20/2018 11:00 AM  
ESTABLISHED PATIENT with Jacqueline Garcia NP [address] [facility] ([address]) Appt Note: 3 months f/u  
 [address] [address] [address]  
[phone]  
  
   
 [address] [address]  
  
    
 11/26/2018 10:30 AM  
Any with Mandy Nagy NP [address] [facility] ([address]) Appt Note: 6 week f/u med changes leathw [place] [address] [facility] [address] [phone]  
  
   
 [place] [address] [facility] [phone] [id number] [address] Upcoming Health Maintenance Date Due Hepatitis C Screening 1950 DTaP/Tdap/Td series (1 - Tdap) 4/1/1971 Shingrix Vaccine Age 50> (1 of 2) 4/1/2000 COLONOSCOPY 6/21/2016 FOOT EXAM Q1 11/24/2016 Pneumococcal 65+ High/Highest Risk (2 of 2 - PPSV23) 9/26/2017 Influenza Age 5 to Adult 8/1/2018 EYE EXAM RETINAL OR DILATED Q1 9/22/2018 HEMOGLOBIN A1C Q6M 2/20/2019 MICROALBUMIN Q1 8/20/2019 LIPID PANEL Q1 8/20/2019 MEDICARE YEARLY EXAM 8/21/2019 GLAUCOMA SCREENING Q2Y 9/22/2019 Allergies as of 10/16/2018  Review Complete On: 10/16/2018 By: Scout Sargent No Known Allergies Current Immunizations  Reviewed on 11/24/2015 Name Date Influenza Vaccine 9/22/2016, 10/5/2014 Influenza Vaccine Split 11/26/2012, 12/20/2011 Influenza Vaccine Whole 12/20/2011 Pneumococcal Conjugate (PCV-13) 8/1/2017, 11/24/2015 ZZZ-RETIRED (DO NOT USE) Pneumococcal Vaccine (Unspecified Type) 12/20/2011, 12/20/2011 Not reviewed this visit You Were Diagnosed With   
  
 Codes Comments Alzheimer's dementia with behavioral disturbance, unspecified timing of dementia onset    -  Primary ICD-10-CM: G30.9, F02.81 ICD-9-CM: 331.0, 294.11 Vitals BP Height(growth percentile) Weight(growth percentile) BMI Smoking Status 106/72 6' 1\" (1.854 m) 176 lb (79.8 kg) 23.22 kg/m2 Current Every Day Smoker BMI and BSA Data Body Mass Index Body Surface Area  
 23.22 kg/m 2 2.03 m 2 Preferred Pharmacy Pharmacy Name Phone [facility] [address], [address] [place] [address] [phone] Your Updated Medication List  
  
   
This list is accurate as of 10/16/18 10:31 AM.  Always use your most recent med list.  
  
  
  
  
 aspirin 325 mg tablet Commonly known as:  ASPIRIN Take 325 mg by mouth daily. dextroamphetamine-amphetamine 20 mg tablet Commonly known as:  ADDERALL Take 2 Tabs (40 mg total) by mouth dailyEarliest Fill Date: 11/15/18. Max Daily Amount: 40 mg  
Start taking on:  11/15/2018  
  
 donepezil 10 mg tablet Commonly known as:  ARICEPT  
TAKE 1 TABLET BY MOUTH EVERY NIGHT  
  
 escitalopram oxalate 20 mg tablet Commonly known as:  Hafsa Cunas Take 1 Tab by mouth daily. FISH OIL PO Take 1,200 mg by mouth daily. furosemide 20 mg tablet Commonly known as:  LASIX Take 1 Tab by mouth daily. M-VIT PO Take  by mouth.  
  
 memantine 10 mg tablet Commonly known as:  Xander Cobia TAKE 1 TABLET BY MOUTH TWICE DAILY  
  
 metoprolol succinate 50 mg XL tablet Commonly known as:  TOPROL XL Take 1 Tab by mouth daily. niacin  mg tablet Commonly known as:  Niaspan Take 1 Tab by mouth nightly. OMEGA 3 PO Take  by mouth. POTASSIUM PO Take 550 mg by mouth. rosuvastatin 20 mg tablet Commonly known as:  CRESTOR Take 1 Tab by mouth daily. sildenafil (antihypertensive) 20 mg tablet Commonly known as:  REVATIO TK UP TO 5 TS PO AS DIRECTED Prescriptions Printed Refills  
 dextroamphetamine-amphetamine (ADDERALL) 20 mg tablet 0 Starting on: 11/15/2018 Sig: Take 2 Tabs (40 mg total) by mouth dailyEarliest Fill Date: 11/15/18. Max Daily Amount: 40 mg  
 Class: Print Route: Oral  
  
Prescriptions Sent to Pharmacy Refills  
 escitalopram oxalate (LEXAPRO) 20 mg tablet 5 Sig: Take 1 Tab by mouth daily. Class: Normal  
 Pharmacy: [facility] [address] [place] [address] #: [phone] Route: Oral  
  
Follow-up Instructions Return in about 6 weeks (around 11/27/2018). Patient Instructions PRESCRIPTION REFILL POLICY [facility] Statement to Patients April 1, 2014 In an effort to ensure the large volume of patient prescription refills is processed in the most efficient and expeditious manner, we are asking our patients to assist us by calling your Pharmacy for all prescription refills, this will include also your  Mail Order Pharmacy. The pharmacy will contact our office electronically to continue the refill process. Please do not wait until the last minute to call your pharmacy. We need at least 48 hours (2days) to fill prescriptions. We also encourage you to call your pharmacy before going to  your prescription to make sure it is ready. With regard to controlled substance prescription refill requests (narcotic refills) that need to be picked up at our office, we ask your cooperation by providing us with at least 72 hours (3days) notice that you will need a refill. We will not refill narcotic prescription refill requests after 4:00pm on any weekday, Monday through Thursday, or after 2:00pm on Fridays, or on the weekends. We encourage everyone to explore another way of getting your prescription refill request processed using tapvivat, our patient web portal through our electronic medical record system. tapvivat is an efficient and effective way to communicate your medication request directly to the office and  downloadable as an kevin on your smart phone . [facility] also features a review functionality that allows you to view your medication list as well as leave messages for your physician. Are you ready to get connected? If so please review the attatched instructions or speak to any of our staff to get you set up right away! Thank you so much for your cooperation. Should you have any questions please contact our Practice Administrator. The Physicians and Staff,  [facility] Introducing [facility] [place] [facility] SERVICES! [facility] introduces [facility] patient portal. Now you can access parts of your medical record, email your doctor's office, and request medication refills online. 1. In your internet browser, go to [email or web address] 2. Click on the First Time User? Click Here link in the Sign In box. You will see the New Member Sign Up page. 3. Enter your [facility] Access Code exactly as it appears below. You will not need to use this code after youve completed the sign-up process. If you do not sign up before the expiration date, you must request a new code. · [facility] Access Code: [email or web address] Expires: 11/18/2018  2:11 PM 
 
4. Enter the last four digits of your Social Security Number (xxxx) and Date of Birth (mm/dd/yyyy) as indicated and click Submit. You will be taken to the next sign-up page. 5. Create a tapvivat ID. This will be your [facility] login ID and cannot be changed, so think of one that is secure and easy to remember. 6. Create a [facility] password. You can change your password at any time. 7. Enter your Password Reset Question and Answer.  This can be used at a later time if you forget your password. 8. Enter your e-mail address. You will receive e-mail notification when new information is available in [address] 9. Click Sign Up. You can now view and download portions of your medical record. 10. Click the Download Summary menu link to download a portable copy of your medical information. If you have questions, please visit the Frequently Asked Questions section of the [employer] website. Remember, [employer] is NOT to be used for urgent needs. For medical emergencies, dial 911. Now available from your iPhone and Android! Please provide this summary of care documentation to your next provider. Your primary care clinician is listed as Flower Barriga. If you have any questions after today's visit, please call [phone].

## 2018-10-16 NOTE — PROGRESS NOTES
Patient is here for follow up. States memory is staying the same, but wife disagrees. neuropsych results.

## 2018-10-17 ENCOUNTER — TELEPHONE (OUTPATIENT)
Dept: NEUROLOGY | Age: 68
End: 2018-10-17

## 2018-11-02 ENCOUNTER — TELEPHONE (OUTPATIENT)
Dept: FAMILY MEDICINE CLINIC | Age: 68
End: 2018-11-02

## 2018-11-02 DIAGNOSIS — E78.00 HYPERCHOLESTEREMIA: ICD-10-CM

## 2018-11-02 RX ORDER — NIACIN 500 MG/1
500 TABLET, EXTENDED RELEASE ORAL
Qty: 90 TAB | Refills: 3 | Status: SHIPPED | OUTPATIENT
Start: 2018-11-02 | End: 2018-11-05 | Stop reason: SDUPTHER

## 2018-11-02 NOTE — TELEPHONE ENCOUNTER
----- Message from Julia Florez sent at 11/2/2018 12:27 PM EDT -----  Regarding: RAYMOND Ramirez/Telephone   Arsen Sommer (wife) is requesting a refill of Rx \"Niasvan 500mg. \". Pt only has 3 tablets left. The pharmacy on file [facility]. Best contact is [phone].

## 2018-11-05 ENCOUNTER — TELEPHONE (OUTPATIENT)
Dept: FAMILY MEDICINE CLINIC | Age: 68
End: 2018-11-05

## 2018-11-05 DIAGNOSIS — E78.00 HYPERCHOLESTEREMIA: ICD-10-CM

## 2018-11-05 RX ORDER — NIACIN 500 MG/1
500 TABLET, EXTENDED RELEASE ORAL
Qty: 10 TAB | Refills: 0 | Status: SHIPPED | OUTPATIENT
Start: 2018-11-05 | End: 2019-11-25

## 2018-11-05 NOTE — TELEPHONE ENCOUNTER
Pt wife calling and wanting to see if she could get a small supply of medication Niacin sent to local [facility] due to mail order will not get here prior to him running out. If any questions please call [phone].

## 2018-11-26 ENCOUNTER — OFFICE VISIT (OUTPATIENT)
Dept: NEUROLOGY | Age: 68
End: 2018-11-26

## 2018-11-26 VITALS
BODY MASS INDEX: 24.25 KG/M2 | DIASTOLIC BLOOD PRESSURE: 68 MMHG | HEIGHT: 73 IN | SYSTOLIC BLOOD PRESSURE: 114 MMHG | WEIGHT: 183 LBS

## 2018-11-26 DIAGNOSIS — F03.90 DEMENTIA WITHOUT BEHAVIORAL DISTURBANCE, UNSPECIFIED DEMENTIA TYPE: ICD-10-CM

## 2018-11-26 DIAGNOSIS — F02.81 ALZHEIMER'S DEMENTIA WITH BEHAVIORAL DISTURBANCE, UNSPECIFIED TIMING OF DEMENTIA ONSET: ICD-10-CM

## 2018-11-26 DIAGNOSIS — G30.9 ALZHEIMER'S DEMENTIA WITH BEHAVIORAL DISTURBANCE, UNSPECIFIED TIMING OF DEMENTIA ONSET: ICD-10-CM

## 2018-11-26 RX ORDER — MEMANTINE HYDROCHLORIDE 10 MG/1
TABLET ORAL
Qty: 60 TAB | Refills: 5 | Status: SHIPPED | OUTPATIENT
Start: 2018-11-26 | End: 2019-02-25 | Stop reason: SDUPTHER

## 2018-11-26 RX ORDER — DEXTROAMPHETAMINE SACCHARATE, AMPHETAMINE ASPARTATE, DEXTROAMPHETAMINE SULFATE AND AMPHETAMINE SULFATE 5; 5; 5; 5 MG/1; MG/1; MG/1; MG/1
40 TABLET ORAL DAILY
Qty: 60 TAB | Refills: 0 | Status: SHIPPED | OUTPATIENT
Start: 2018-12-15 | End: 2018-11-26 | Stop reason: SDUPTHER

## 2018-11-26 RX ORDER — DEXTROAMPHETAMINE SACCHARATE, AMPHETAMINE ASPARTATE, DEXTROAMPHETAMINE SULFATE AND AMPHETAMINE SULFATE 5; 5; 5; 5 MG/1; MG/1; MG/1; MG/1
40 TABLET ORAL DAILY
Qty: 60 TAB | Refills: 0 | Status: SHIPPED | OUTPATIENT
Start: 2019-01-15 | End: 2018-11-26 | Stop reason: SDUPTHER

## 2018-11-26 RX ORDER — DEXTROAMPHETAMINE SACCHARATE, AMPHETAMINE ASPARTATE, DEXTROAMPHETAMINE SULFATE AND AMPHETAMINE SULFATE 5; 5; 5; 5 MG/1; MG/1; MG/1; MG/1
40 TABLET ORAL DAILY
Qty: 60 TAB | Refills: 0 | Status: SHIPPED | OUTPATIENT
Start: 2019-02-15 | End: 2019-02-25 | Stop reason: SDUPTHER

## 2018-11-26 RX ORDER — DONEPEZIL HYDROCHLORIDE 10 MG/1
TABLET, FILM COATED ORAL
Qty: 30 TAB | Refills: 5 | Status: SHIPPED | OUTPATIENT
Start: 2018-11-26 | End: 2019-02-25 | Stop reason: SDUPTHER

## 2018-11-26 NOTE — PROGRESS NOTES
Callie Bello is a 76 y.o. male who presents with the following  Chief Complaint   Patient presents with    Follow-up       HPI Patient comes in with wife for a follow up for Dementia, anxiety. Currently on Aricept 10 mg and namenda 10 mg BID and doing well with this. Feels his long term memory has been the same but his short term is getting worse still. She has to tell him things multiple times, will forget to take out trash will actually walk over it to do something else. Short term giving an example of he will get something out of the kitchen and will put it back in the wrong place. Is better with the increase to 40 mg Adderall in the morning the wife has noticed. We also increase Lexapro to 20 mg and she has noticed his agitation, stress, irritation day to day has decreased and his mood is a lot better then before. He is calmer and his memory is actually showing to be better. Wife will have to repeat herself, ask same questions. She does help him remember the medications day to day when he forgets even though they are on his bedside table. Overall feels like the changes have been positive. Still not sleeping in the bed. No Known Allergies    Current Outpatient Medications   Medication Sig    memantine (NAMENDA) 10 mg tablet TAKE 1 TABLET BY MOUTH TWICE DAILY    donepezil (ARICEPT) 10 mg tablet TAKE 1 TABLET BY MOUTH EVERY NIGHT    [START ON 2/15/2019] dextroamphetamine-amphetamine (ADDERALL) 20 mg tablet Take 2 Tabs (40 mg total) by mouth dailyEarliest Fill Date: 2/15/19. Max Daily Amount: 40 mg    niacin ER (NIASPAN) 500 mg tablet Take 1 Tab by mouth nightly.  escitalopram oxalate (LEXAPRO) 20 mg tablet Take 1 Tab by mouth daily.  sildenafil, antihypertensive, (REVATIO) 20 mg tablet TK UP TO 5 TS PO AS DIRECTED    rosuvastatin (CRESTOR) 20 mg tablet Take 1 Tab by mouth daily.  metoprolol succinate (TOPROL XL) 50 mg XL tablet Take 1 Tab by mouth daily.     furosemide (LASIX) 20 mg tablet Take 1 Tab by mouth daily.  DOCOSAHEXANOIC ACID/EPA (FISH OIL PO) Take 1,200 mg by mouth daily.  PV W-O LAZARUS/FERROUS FUMARATE/FA (M-VIT PO) Take  by mouth.  POTASSIUM PO Take 550 mg by mouth.  OMEGA-3 FATTY ACIDS (OMEGA 3 PO) Take  by mouth.  aspirin (ASPIRIN) 325 mg tablet Take 325 mg by mouth daily. No current facility-administered medications for this visit. Social History     Tobacco Use   Smoking Status Current Every Day Smoker    Packs/day: 1.00   Smokeless Tobacco Never Used       Past Medical History:   Diagnosis Date    Alcoholism ([address]     CAD (coronary artery disease) 6/15/2010    [facility]    Falls     Heart disease     HTN (hypertension) 6/15/2010    Hypercholesteremia 6/15/2010    PTSD (post-traumatic stress disorder)     Skin cancer 6/15/2010    Smoker 6/15/2010    Snoring        Past Surgical History:   Procedure Laterality Date    CARDIAC SURG PROCEDURE UNLIST      stent       Family History   Problem Relation Age of Onset    Cancer Mother     Heart Disease Other        Social History     Socioeconomic History    Marital status:      Spouse name: Not on file    Number of children: Not on file    Years of education: Not on file    Highest education level: Not on file   Tobacco Use    Smoking status: Current Every Day Smoker     Packs/day: 1.00    Smokeless tobacco: Never Used   Substance and Sexual Activity    Alcohol use: Yes     Alcohol/week: 2.4 oz     Types: 4 Cans of beer per week    Drug use: No    Sexual activity: No       Review of Systems   Eyes: Negative for blurred vision and photophobia. Gastrointestinal: Negative for nausea and vomiting. Musculoskeletal: Negative for falls. Neurological: Negative for dizziness, tingling and headaches. Psychiatric/Behavioral: Positive for memory loss. The patient is nervous/anxious. Remainder of comprehensive review is negative.      Physical Exam :    Visit Vitals  /68   Ht 6' 1\" (1.854 m)   Wt 83 kg (183 lb)   BMI 24.14 kg/m²       General: Well defined, nourished, and groomed individual in no acute distress.    Neck: Supple, nontender, no bruits, no pain with resistance to active range of motion.    Heart: Regular rate and rhythm, no murmurs, rub, or gallop. Normal S1S2. Lungs: Clear to auscultation bilaterally with equal chest expansion, no cough, no wheeze  Psych: Good mood and bright affect    NEUROLOGICAL EXAMINATION:    Mental Status: Alert and oriented to person, place, and not time. MMSE 22    Cranial Nerves:    II, III, IV, VI: Visual acuity grossly intact. Visual fields are normal.    Pupils are equal, round, and reactive to light and accommodation.    Extra-ocular movements are full and fluid. Fundoscopic exam was benign, no ptosis or nystagmus.    V-XII: Hearing is grossly intact. Facial features are symmetric, with normal sensation and strength. The palate rises symmetrically and the tongue protrudes midline. Sternocleidomastoids 5/5. Motor Examination: Normal tone, bulk, and strength, 5/5 muscle strength throughout. Coordination: Finger to nose was normal. No resting or intention tremor    Gait and Station: Steady while walking. Normal arm swing. No pronator drift. No muscle wasting or fasiculations noted. Results for orders placed or performed in visit on 08/20/18   CBC WITH AUTOMATED DIFF   Result Value Ref Range    WBC 7.7 3.4 - 10.8 x10E3/uL    RBC 4.50 4. 14 - 5.80 x10E6/uL    HGB 14.7 13.0 - 17.7 g/dL    HCT 44.3 37.5 - 51.0 %    MCV 98 (H) 79 - 97 fL    MCH 32.7 26.6 - 33.0 pg    MCHC 33.2 31.5 - 35.7 g/dL    RDW 13.7 12.3 - 15.4 %    PLATELET 731 451 - 808 x10E3/uL    NEUTROPHILS 46 Not Estab. %    Lymphocytes 44 Not Estab. %    MONOCYTES 7 Not Estab. %    EOSINOPHILS 3 Not Estab. %    BASOPHILS 0 Not Estab. %    ABS. NEUTROPHILS 3.5 1.4 - 7.0 x10E3/uL    Abs Lymphocytes 3.4 (H) 0.7 - 3.1 x10E3/uL    ABS. MONOCYTES 0.6 0.1 - 0.9 x10E3/uL    ABS. EOSINOPHILS 0.2 0.0 - 0.4 x10E3/uL    ABS. BASOPHILS 0.0 0.0 - 0.2 x10E3/uL    IMMATURE GRANULOCYTES 0 Not Estab. %    ABS. IMM. GRANS. 0.0 0.0 - 0.1 x10E3/uL   LIPID PANEL   Result Value Ref Range    Cholesterol, total 171 100 - 199 mg/dL    Triglyceride 78 0 - 149 mg/dL    HDL Cholesterol 72 >39 mg/dL    VLDL, calculated 16 5 - 40 mg/dL    LDL, calculated 83 0 - 99 mg/dL   METABOLIC PANEL, COMPREHENSIVE   Result Value Ref Range    Glucose 93 65 - 99 mg/dL    BUN 24 8 - 27 mg/dL    Creatinine 0.95 0.76 - 1.27 mg/dL    GFR est non-AA 82 >59 mL/min/1.73    GFR est AA 95 >59 mL/min/1.73    BUN/Creatinine ratio 25 (H) 10 - 24    Sodium 141 134 - 144 mmol/L    Potassium 4.3 3.5 - 5.2 mmol/L    Chloride 103 96 - 106 mmol/L    CO2 24 20 - 29 mmol/L    Calcium 10.1 8.6 - 10.2 mg/dL    Protein, total 7.3 6.0 - 8.5 g/dL    Albumin 4.8 3.6 - 4.8 g/dL    GLOBULIN, TOTAL 2.5 1.5 - 4.5 g/dL    A-G Ratio 1.9 1.2 - 2.2    Bilirubin, total 0.2 0.0 - 1.2 mg/dL    Alk.  phosphatase 70 39 - 117 IU/L    AST (SGOT) 36 0 - 40 IU/L    ALT (SGPT) 23 0 - 44 IU/L   TSH 3RD GENERATION   Result Value Ref Range    TSH 3.010 0.450 - 4.500 uIU/mL   PSA, DIAGNOSTIC (PROSTATE SPECIFIC AG)   Result Value Ref Range    Prostate Specific Ag 3.5 0.0 - 4.0 ng/mL   HEMOGLOBIN A1C WITH EAG   Result Value Ref Range    Hemoglobin A1c 6.2 (H) 4.8 - 5.6 %    Estimated average glucose 131 mg/dL   MICROALBUMIN, UR, RAND W/ MICROALB/CREAT RATIO   Result Value Ref Range    Creatinine, urine 123.9 Not Estab. mg/dL    Microalbumin, urine 3.9 Not Estab. ug/mL    Microalb/Creat ratio (ug/mg creat.) 3.1 0.0 - 30.0 mg/g creat   CVD REPORT   Result Value Ref Range    INTERPRETATION Note    DIABETES PATIENT EDUCATION   Result Value Ref Range    PDF Image Not applicable        Orders Placed This Encounter    memantine (NAMENDA) 10 mg tablet     Sig: TAKE 1 TABLET BY MOUTH TWICE DAILY     Dispense:  60 Tab     Refill:  5    donepezil (ARICEPT) 10 mg tablet     Sig: TAKE 1 TABLET BY MOUTH EVERY NIGHT     Dispense:  30 Tab     Refill:  5    DISCONTD: dextroamphetamine-amphetamine (ADDERALL) 20 mg tablet     Sig: Take 2 Tabs (40 mg total) by mouth dailyEarliest Fill Date: 12/15/18. Max Daily Amount: 40 mg     Dispense:  60 Tab     Refill:  0    DISCONTD: dextroamphetamine-amphetamine (ADDERALL) 20 mg tablet     Sig: Take 2 Tabs (40 mg total) by mouth dailyEarliest Fill Date: 1/15/19. Max Daily Amount: 40 mg     Dispense:  60 Tab     Refill:  0    dextroamphetamine-amphetamine (ADDERALL) 20 mg tablet     Sig: Take 2 Tabs (40 mg total) by mouth dailyEarliest Fill Date: 2/15/19. Max Daily Amount: 40 mg     Dispense:  60 Tab     Refill:  0       1. Dementia without behavioral disturbance, unspecified dementia type    2. Alzheimer's dementia with behavioral disturbance, unspecified timing of dementia onset        Follow-up Disposition:  Return in about 3 months (around 2/26/2019). Memory seems to be doing better with increase Adderall to 40 mg and Lexapro to 20 mg and anxiety, restless better. Still in Moderate dementia range. Keep these for further symptom management. Keep Aricept at 10 mg daily and Namenda at 10 mg BID for memory preservation as these are working well. Remember to stay active and keep the brain engaged. Keep physically active also. Try to sleep in the bed. Keep hydrated. Wife is taking good care of him and to call if anything changes.          This note will not be viewable in [facility]

## 2018-11-26 NOTE — PATIENT INSTRUCTIONS
[facility]   Statement to Patients  April 1, 2014      In an effort to ensure the large volume of patient prescription refills is processed in the most efficient and expeditious manner, we are asking our patients to assist us by calling your Pharmacy for all prescription refills, this will include also your  Mail Order Pharmacy. The pharmacy will contact our office electronically to continue the refill process. Please do not wait until the last minute to call your pharmacy. We need at least 48 hours (2days) to fill prescriptions. We also encourage you to call your pharmacy before going to  your prescription to make sure it is ready. With regard to controlled substance prescription refill requests (narcotic refills) that need to be picked up at our office, we ask your cooperation by providing us with at least 72 hours (3days) notice that you will need a refill. We will not refill narcotic prescription refill requests after 4:00pm on any weekday, Monday through Thursday, or after 2:00pm on Fridays, or on the weekends. We encourage everyone to explore another way of getting your prescription refill request processed using [employer], our patient web portal through our electronic medical record system. [employer] is an efficient and effective way to communicate your medication request directly to the office and  downloadable as an kevin on your smart phone . [employer] also features a review functionality that allows you to view your medication list as well as leave messages for your physician. Are you ready to get connected? If so please review the attatched instructions or speak to any of our staff to get you set up right away! Thank you so much for your cooperation. Should you have any questions please contact our Practice Administrator.     The Physicians and Staff,  [facility]

## 2018-11-27 ENCOUNTER — OFFICE VISIT (OUTPATIENT)
Dept: FAMILY MEDICINE CLINIC | Age: 68
End: 2018-11-27

## 2018-11-27 VITALS
OXYGEN SATURATION: 99 % | WEIGHT: 184 LBS | HEIGHT: 73 IN | SYSTOLIC BLOOD PRESSURE: 115 MMHG | BODY MASS INDEX: 24.39 KG/M2 | DIASTOLIC BLOOD PRESSURE: 78 MMHG | TEMPERATURE: 97.6 F | HEART RATE: 63 BPM | RESPIRATION RATE: 16 BRPM

## 2018-11-27 DIAGNOSIS — E11.9 TYPE 2 DIABETES MELLITUS WITHOUT COMPLICATION, WITHOUT LONG-TERM CURRENT USE OF INSULIN (HCC): ICD-10-CM

## 2018-11-27 DIAGNOSIS — Z23 ENCOUNTER FOR IMMUNIZATION: ICD-10-CM

## 2018-11-27 DIAGNOSIS — E78.00 HYPERCHOLESTEREMIA: Primary | ICD-10-CM

## 2018-11-27 NOTE — PROGRESS NOTES
HISTORY OF PRESENT ILLNESS  Consuelo Carrillo Sr. is a 76 y.o. male. HPI  Cardiovascular Review:  The patient has hyperlipidemia. Diet and Lifestyle: generally follows a low fat low cholesterol diet, generally follows a low sodium diet, sedentary, nonsmoker  Home BP Monitoring: is not measured at home. Pertinent ROS: taking medications as instructed, no medication side effects noted, no TIA's, no chest pain on exertion, no dyspnea on exertion, no swelling of ankles. Diabetes Mellitus:  He has diabetes mellitus, and  hyperlipidemia. Diabetic ROS - medication compliance: compliant all of the time, diabetic diet compliance: compliant most of the time, home glucose monitoring: is not performed. Lab review: orders written for new lab studies as appropriate; see orders. Flu shot/Pneumovax:  Patient is here today for flu shot. Denies any previous adr/se to the flu shot, no egg allergy and no recent illness or fever. He has received his Prevnar last year, needs to get Pneumovax 23    Patient Active Problem List    Diagnosis Date Noted    Alzheimer's dementia with behavioral disturbance 08/20/2018    Advanced care planning/counseling discussion 08/18/2017    Advance care planning 03/21/2016    Hypercholesteremia 06/15/2010    Skin cancer 06/15/2010    CAD (coronary artery disease) 06/15/2010    HTN (hypertension) 06/15/2010    [facility] 06/15/2010    Smoker 06/15/2010     Current Outpatient Medications   Medication Sig Dispense Refill    memantine (NAMENDA) 10 mg tablet TAKE 1 TABLET BY MOUTH TWICE DAILY 60 Tab 5    donepezil (ARICEPT) 10 mg tablet TAKE 1 TABLET BY MOUTH EVERY NIGHT 30 Tab 5    [START ON 2/15/2019] dextroamphetamine-amphetamine (ADDERALL) 20 mg tablet Take 2 Tabs (40 mg total) by mouth dailyEarliest Fill Date: 2/15/19. Max Daily Amount: 40 mg 60 Tab 0    niacin ER (NIASPAN) 500 mg tablet Take 1 Tab by mouth nightly.  10 Tab 0    escitalopram oxalate (LEXAPRO) 20 mg tablet Take 1 Tab by mouth daily. 30 Tab 5    rosuvastatin (CRESTOR) 20 mg tablet Take 1 Tab by mouth daily. 90 Tab 3    metoprolol succinate (TOPROL XL) 50 mg XL tablet Take 1 Tab by mouth daily. 90 Tab 3    furosemide (LASIX) 20 mg tablet Take 1 Tab by mouth daily. 30 Tab 5    DOCOSAHEXANOIC ACID/EPA (FISH OIL PO) Take 1,200 mg by mouth daily.  PV W-O LAZARUS/FERROUS FUMARATE/FA (M-VIT PO) Take  by mouth.  POTASSIUM PO Take 550 mg by mouth.  OMEGA-3 FATTY ACIDS (OMEGA 3 PO) Take  by mouth.  aspirin (ASPIRIN) 325 mg tablet Take 325 mg by mouth daily. Family History   Problem Relation Age of Onset    Cancer Mother     Heart Disease Other      Social History     Tobacco Use    Smoking status: Current Every Day Smoker     Packs/day: 1.00    Smokeless tobacco: Never Used   Substance Use Topics    Alcohol use: Yes     Alcohol/week: 2.4 oz     Types: 4 Cans of beer per week       ROS  A comprehensive review of system was obtained and negative except findings in the HPI    Visit Vitals  /78   Pulse 63   Temp 97.6 °F (36.4 °C) (Oral)   Resp 16   Ht 6' 1\" (1.854 m)   Wt 184 lb (83.5 kg)   SpO2 99%   BMI 24.28 kg/m²     Physical Exam   Constitutional: He is oriented to person, place, and time. He appears well-developed and well-nourished. No distress. Cardiovascular: Normal rate and regular rhythm. No murmur heard. Pulmonary/Chest: Breath sounds normal. No respiratory distress. He has no wheezes. Musculoskeletal: He exhibits no edema. Neurological: He is alert and oriented to person, place, and time. Nursing note and vitals reviewed. ASSESSMENT and PLAN  Encounter Diagnoses   Name Primary?     Hypercholesteremia Yes    Type 2 diabetes mellitus without complication, without long-term current use of insulin ([facility] [address]     Encounter for immunization      Orders Placed This Encounter    Influenza Vaccine Inactivated (IIV)(FLUAD), Subunit, Adjuvanted, IM, (10986)    Pneumococcal Polysaccharide vaccine, 23-Valent, Adult or Immunocompromised    LIPID PANEL    HEMOGLOBIN A1C WITH EAG     Both vaccines updated today  Labs updated as well  Recheck labs in 3 mo fasting    I have discussed the diagnosis with the patient and the intended plan as seen in the above orders. The patient has received an after-visit summary and questions were answered concerning future plans. Patient conveyed understanding of the plan at the time of the visit.     Yolanda Garcia, MSN, ANP  11/27/2018

## 2018-11-27 NOTE — PROGRESS NOTES
1. Have you been to the ER, urgent care clinic since your last visit? Hospitalized since your last visit? No    2. Have you seen or consulted any other health care providers outside of the [address] [place] [address] since your last visit? Include any pap smears or colon screening.  No   Chief Complaint   Patient presents with    Follow-up     3 months

## 2018-11-28 LAB
CHOLEST SERPL-MCNC: 161 MG/DL (ref 100–199)
EST. AVERAGE GLUCOSE BLD GHB EST-MCNC: 134 MG/DL
HBA1C MFR BLD: 6.3 % (ref 4.8–5.6)
HDLC SERPL-MCNC: 70 MG/DL
INTERPRETATION, 910389: NORMAL
LDLC SERPL CALC-MCNC: 55 MG/DL (ref 0–99)
Lab: NORMAL
TRIGL SERPL-MCNC: 178 MG/DL (ref 0–149)
VLDLC SERPL CALC-MCNC: 36 MG/DL (ref 5–40)

## 2018-12-14 RX ORDER — METOPROLOL SUCCINATE 50 MG/1
50 TABLET, EXTENDED RELEASE ORAL DAILY
Qty: 90 TAB | Refills: 3 | Status: SHIPPED | OUTPATIENT
Start: 2018-12-14 | End: 2018-12-18 | Stop reason: SDUPTHER

## 2018-12-18 RX ORDER — METOPROLOL SUCCINATE 50 MG/1
50 TABLET, EXTENDED RELEASE ORAL DAILY
Qty: 90 TAB | Refills: 3 | Status: SHIPPED | OUTPATIENT
Start: 2018-12-18 | End: 2019-12-07 | Stop reason: SDUPTHER

## 2018-12-18 RX ORDER — FUROSEMIDE 20 MG/1
20 TABLET ORAL DAILY
Qty: 30 TAB | Refills: 5 | Status: SHIPPED | OUTPATIENT
Start: 2018-12-18 | End: 2019-07-30 | Stop reason: SDUPTHER

## 2019-01-15 RX ORDER — FUROSEMIDE 20 MG/1
TABLET ORAL
Qty: 30 TAB | Refills: 0 | Status: SHIPPED | OUTPATIENT
Start: 2019-01-15 | End: 2019-02-25

## 2019-02-04 ENCOUNTER — TELEPHONE (OUTPATIENT)
Dept: NEUROLOGY | Age: 69
End: 2019-02-04

## 2019-02-04 NOTE — TELEPHONE ENCOUNTER
R/t call to patient's wife, Trey Mock. Informed her paperwork was faxed to [facility] 1/29/19.  She states understanding

## 2019-02-04 NOTE — TELEPHONE ENCOUNTER
----- Message from Vikki Wiggins sent at 2/4/2019 11:52 AM EST -----  Regarding: RAYMOND Arellano/telephone  Pt's wife (p) [phone], following up on the court papers that needed to be sent  To court for him to be excused for jury duty due to his dx of alzheimer's  He was to report on 3/4 to 3/29  , they are calling to confirm if it was faxed  It was dropped off one or two weeks go. Said fax number is on the paper work.

## 2019-02-11 DIAGNOSIS — F02.81 ALZHEIMER'S DEMENTIA WITH BEHAVIORAL DISTURBANCE, UNSPECIFIED TIMING OF DEMENTIA ONSET: ICD-10-CM

## 2019-02-11 DIAGNOSIS — G30.9 ALZHEIMER'S DEMENTIA WITH BEHAVIORAL DISTURBANCE, UNSPECIFIED TIMING OF DEMENTIA ONSET: ICD-10-CM

## 2019-02-11 RX ORDER — ESCITALOPRAM OXALATE 10 MG/1
TABLET ORAL
Qty: 30 TAB | Refills: 0 | Status: SHIPPED | OUTPATIENT
Start: 2019-02-11 | End: 2019-02-25

## 2019-02-25 ENCOUNTER — OFFICE VISIT (OUTPATIENT)
Dept: NEUROLOGY | Age: 69
End: 2019-02-25

## 2019-02-25 VITALS
SYSTOLIC BLOOD PRESSURE: 120 MMHG | DIASTOLIC BLOOD PRESSURE: 72 MMHG | BODY MASS INDEX: 24.78 KG/M2 | WEIGHT: 187 LBS | HEIGHT: 73 IN

## 2019-02-25 DIAGNOSIS — F02.81 ALZHEIMER'S DEMENTIA WITH BEHAVIORAL DISTURBANCE, UNSPECIFIED TIMING OF DEMENTIA ONSET: ICD-10-CM

## 2019-02-25 DIAGNOSIS — G30.9 ALZHEIMER'S DEMENTIA WITH BEHAVIORAL DISTURBANCE, UNSPECIFIED TIMING OF DEMENTIA ONSET: ICD-10-CM

## 2019-02-25 DIAGNOSIS — F03.90 DEMENTIA WITHOUT BEHAVIORAL DISTURBANCE, UNSPECIFIED DEMENTIA TYPE: ICD-10-CM

## 2019-02-25 RX ORDER — DEXTROAMPHETAMINE SACCHARATE, AMPHETAMINE ASPARTATE, DEXTROAMPHETAMINE SULFATE AND AMPHETAMINE SULFATE 5; 5; 5; 5 MG/1; MG/1; MG/1; MG/1
40 TABLET ORAL DAILY
Qty: 60 TAB | Refills: 0 | Status: SHIPPED | OUTPATIENT
Start: 2019-05-12 | End: 2019-04-22

## 2019-02-25 RX ORDER — DEXTROAMPHETAMINE SACCHARATE, AMPHETAMINE ASPARTATE, DEXTROAMPHETAMINE SULFATE AND AMPHETAMINE SULFATE 5; 5; 5; 5 MG/1; MG/1; MG/1; MG/1
40 TABLET ORAL DAILY
Qty: 60 TAB | Refills: 0 | Status: SHIPPED | OUTPATIENT
Start: 2019-03-14 | End: 2019-02-25 | Stop reason: SDUPTHER

## 2019-02-25 RX ORDER — DEXTROAMPHETAMINE SACCHARATE, AMPHETAMINE ASPARTATE, DEXTROAMPHETAMINE SULFATE AND AMPHETAMINE SULFATE 5; 5; 5; 5 MG/1; MG/1; MG/1; MG/1
40 TABLET ORAL DAILY
Qty: 60 TAB | Refills: 0 | Status: SHIPPED | OUTPATIENT
Start: 2019-04-13 | End: 2019-02-25 | Stop reason: SDUPTHER

## 2019-02-25 RX ORDER — MEMANTINE HYDROCHLORIDE 10 MG/1
TABLET ORAL
Qty: 60 TAB | Refills: 5 | Status: SHIPPED | OUTPATIENT
Start: 2019-02-25 | End: 2019-08-06 | Stop reason: SDUPTHER

## 2019-02-25 RX ORDER — ESCITALOPRAM OXALATE 20 MG/1
20 TABLET ORAL DAILY
Qty: 30 TAB | Refills: 5 | Status: SHIPPED | OUTPATIENT
Start: 2019-02-25 | End: 2019-11-25

## 2019-02-25 RX ORDER — DONEPEZIL HYDROCHLORIDE 10 MG/1
TABLET, FILM COATED ORAL
Qty: 30 TAB | Refills: 5 | Status: SHIPPED | OUTPATIENT
Start: 2019-02-25 | End: 2019-11-25

## 2019-02-25 NOTE — PROGRESS NOTES
Rosy Ronquillo is a 76 y.o. male who presents with the following  Chief Complaint   Patient presents with    Memory Loss       HPI Patient comes in with wife for a follow up for Dementia, anxiety. things are going well. Currently on Aricept 10 mg and namenda 10 mg BID and doing well with this. Feels his long term memory has been the same but his short term is getting worse still. She has to tell him things multiple times, will forget to take out trash will actually walk over it to do something else. Short term giving an example of he will get something out of the kitchen and will put it back in the wrong place. Is better with the increase to 40 mg Adderall in the morning the wife has noticed. We also increase Lexapro to 20 mg and she has noticed his agitation, stress, irritation day to day has decreased and his mood is a lot better then before. He is calmer and his memory is actually showing to be better. Wife will have to repeat herself, ask same questions. She does help him remember the medications day to day when he forgets even though they are on his bedside table. Overall feels like the changes have been positive. Still not sleeping in the bed. want to get a new bed soon. No Known Allergies    Current Outpatient Medications   Medication Sig    [START ON 5/12/2019] dextroamphetamine-amphetamine (ADDERALL) 20 mg tablet Take 2 Tabs (40 mg total) by mouth dailyEarliest Fill Date: 5/12/19. Max Daily Amount: 40 mg    donepezil (ARICEPT) 10 mg tablet TAKE 1 TABLET BY MOUTH EVERY NIGHT    memantine (NAMENDA) 10 mg tablet TAKE 1 TABLET BY MOUTH TWICE DAILY    escitalopram oxalate (LEXAPRO) 20 mg tablet Take 1 Tab by mouth daily.  furosemide (LASIX) 20 mg tablet Take 1 Tab by mouth daily.  metoprolol succinate (TOPROL XL) 50 mg XL tablet Take 1 Tab by mouth daily.  niacin ER (NIASPAN) 500 mg tablet Take 1 Tab by mouth nightly.     rosuvastatin (CRESTOR) 20 mg tablet Take 1 Tab by mouth daily.    DOCOSAHEXANOIC ACID/EPA (FISH OIL PO) Take 1,200 mg by mouth daily.  PV W-O LAZARUS/FERROUS FUMARATE/FA (M-VIT PO) Take  by mouth.  POTASSIUM PO Take 550 mg by mouth.  OMEGA-3 FATTY ACIDS (OMEGA 3 PO) Take  by mouth.  aspirin (ASPIRIN) 325 mg tablet Take 325 mg by mouth daily. No current facility-administered medications for this visit. Social History     Tobacco Use   Smoking Status Current Every Day Smoker    Packs/day: 1.00   Smokeless Tobacco Never Used       Past Medical History:   Diagnosis Date    [facility]ism ([facility]     CAD (coronary artery disease) 6/15/2010    DM (diabetes mellitus) ([facility] [address] 6/15/2010    Falls     Heart disease     HTN (hypertension) 6/15/2010    Hypercholesteremia 6/15/2010    PTSD (post-traumatic stress disorder)     Skin cancer 6/15/2010    Smoker 6/15/2010    Snoring        Past Surgical History:   Procedure Laterality Date    CARDIAC SURG PROCEDURE UNLIST      stent       Family History   Problem Relation Age of Onset    Cancer Mother     Heart Disease Other        Social History     Socioeconomic History    Marital status:      Spouse name: Not on file    Number of children: Not on file    Years of education: Not on file    Highest education level: Not on file   Tobacco Use    Smoking status: Current Every Day Smoker     Packs/day: 1.00    Smokeless tobacco: Never Used   Substance and Sexual Activity    Alcohol use: Yes     Alcohol/week: 2.4 oz     Types: 4 Cans of beer per week    Drug use: No    Sexual activity: No       Review of Systems   Eyes: Negative for blurred vision, double vision and photophobia. Respiratory: Negative for shortness of breath and wheezing. Cardiovascular: Negative for chest pain and palpitations. Gastrointestinal: Negative for nausea and vomiting. Neurological: Negative for dizziness, tingling, seizures, loss of consciousness, weakness and headaches.    Psychiatric/Behavioral: Positive for memory loss. The patient is nervous/anxious. The patient does not have insomnia. Remainder of comprehensive review is negative. Physical Exam :    Visit Vitals  /72   Ht 6' 1\" (1.854 m)   Wt 84.8 kg (187 lb)   BMI 24.67 kg/m²       General: Well defined, nourished, and groomed individual in no acute distress.    Neck: Supple, nontender, no bruits, no pain with resistance to active range of motion.    Heart: Regular rate and rhythm, no murmurs, rub, or gallop. Normal S1S2. Lungs: Clear to auscultation bilaterally with equal chest expansion, no cough, no wheeze  Musculoskeletal: Extremities revealed no edema and had full range of motion of joints.    Psych: Good mood and bright affect    NEUROLOGICAL EXAMINATION:    Mental Status: Alert and oriented to person, place, and time. MMSE 22    Cranial Nerves:    II, III, IV, VI: Visual acuity grossly intact. Visual fields are normal.    Pupils are equal, round, and reactive to light and accommodation.    Extra-ocular movements are full and fluid. Fundoscopic exam was benign, no ptosis or nystagmus.    V-XII: Hearing is grossly intact. Facial features are symmetric, with normal sensation and strength. The palate rises symmetrically and the tongue protrudes midline. Sternocleidomastoids 5/5. Motor Examination: Normal tone, bulk, and strength, 5/5 muscle strength throughout.      Coordination: Finger to nose was normal. No resting or intention tremor                Results for orders placed or performed in visit on 11/27/18   LIPID PANEL   Result Value Ref Range    Cholesterol, total 161 100 - 199 mg/dL    Triglyceride 178 (H) 0 - 149 mg/dL    HDL Cholesterol 70 >39 mg/dL    VLDL, calculated 36 5 - 40 mg/dL    LDL, calculated 55 0 - 99 mg/dL   HEMOGLOBIN A1C WITH EAG   Result Value Ref Range    Hemoglobin A1c 6.3 (H) 4.8 - 5.6 %    Estimated average glucose 134 mg/dL   CVD REPORT   Result Value Ref Range    INTERPRETATION Note    DIABETES PATIENT EDUCATION Result Value Ref Range    PDF Image Not applicable        Orders Placed This Encounter    DISCONTD: dextroamphetamine-amphetamine (ADDERALL) 20 mg tablet     Sig: Take 2 Tabs (40 mg total) by mouth dailyEarliest Fill Date: 3/14/19. Max Daily Amount: 40 mg     Dispense:  60 Tab     Refill:  0    DISCONTD: dextroamphetamine-amphetamine (ADDERALL) 20 mg tablet     Sig: Take 2 Tabs (40 mg total) by mouth dailyEarliest Fill Date: 4/13/19. Max Daily Amount: 40 mg     Dispense:  60 Tab     Refill:  0    dextroamphetamine-amphetamine (ADDERALL) 20 mg tablet     Sig: Take 2 Tabs (40 mg total) by mouth dailyEarliest Fill Date: 5/12/19. Max Daily Amount: 40 mg     Dispense:  60 Tab     Refill:  0    donepezil (ARICEPT) 10 mg tablet     Sig: TAKE 1 TABLET BY MOUTH EVERY NIGHT     Dispense:  30 Tab     Refill:  5    memantine (NAMENDA) 10 mg tablet     Sig: TAKE 1 TABLET BY MOUTH TWICE DAILY     Dispense:  60 Tab     Refill:  5    escitalopram oxalate (LEXAPRO) 20 mg tablet     Sig: Take 1 Tab by mouth daily. Dispense:  30 Tab     Refill:  5       1. Alzheimer's dementia with behavioral disturbance, unspecified timing of dementia onset    2. Dementia without behavioral disturbance, unspecified dementia type        Follow-up Disposition: Not on File       Memory seems to be doing better with increase Adderall to 40 mg and Lexapro to 20 mg and anxiety, restless better. Things are up and down and we discussed this. Still in Moderate dementia range. Keep these for further symptom management. Keep Aricept at 10 mg daily and Namenda at 10 mg BID for memory preservation as these are working well. Remember to stay active and keep the brain engaged. Keep physically active also. Try to sleep in the bed. Keep hydrated. Wife is taking good care of him and to call if anything changes.   Keep the brain active.             This note will not be viewable in [address]

## 2019-02-25 NOTE — PROGRESS NOTES
Patient is here for follow up. States he has been good but could be better. Memory is gradually getting worse. He thinks he is doing okay.

## 2019-02-27 ENCOUNTER — HOSPITAL ENCOUNTER (OUTPATIENT)
Dept: LAB | Age: 69
Discharge: HOME | End: 2019-02-27
Payer: MEDICARE

## 2019-02-27 ENCOUNTER — OFFICE VISIT (OUTPATIENT)
Dept: FAMILY MEDICINE CLINIC | Age: 69
End: 2019-02-27

## 2019-02-27 VITALS
TEMPERATURE: 98.2 F | SYSTOLIC BLOOD PRESSURE: 122 MMHG | OXYGEN SATURATION: 96 % | BODY MASS INDEX: 24.92 KG/M2 | RESPIRATION RATE: 16 BRPM | DIASTOLIC BLOOD PRESSURE: 82 MMHG | WEIGHT: 188 LBS | HEIGHT: 73 IN | HEART RATE: 75 BPM

## 2019-02-27 DIAGNOSIS — E11.9 TYPE 2 DIABETES MELLITUS WITHOUT COMPLICATION, WITHOUT LONG-TERM CURRENT USE OF INSULIN (HCC): ICD-10-CM

## 2019-02-27 DIAGNOSIS — E78.00 HYPERCHOLESTEREMIA: Primary | ICD-10-CM

## 2019-02-27 PROCEDURE — 80061 LIPID PANEL: CPT

## 2019-02-27 PROCEDURE — 83036 HEMOGLOBIN GLYCOSYLATED A1C: CPT

## 2019-02-27 NOTE — PROGRESS NOTES
Chief Complaint   Patient presents with    Follow-up    Labs     Pt in office today for bp check and labs    Pt has no other concerns

## 2019-02-27 NOTE — PROGRESS NOTES
HISTORY OF PRESENT ILLNESS  Malvin Pierce Sr. is a 76 y.o. male. HPI  Cardiovascular Review:  The patient has hyperlipidemia. Diet and Lifestyle: generally follows a low fat low cholesterol diet, generally follows a low sodium diet, exercises sporadically, nonsmoker  Home BP Monitoring: is not measured at home. Pertinent ROS: taking medications as instructed, no medication side effects noted, no TIA's, no chest pain on exertion, no dyspnea on exertion, no swelling of ankles. Diabetes Mellitus:  He has diabetes mellitus, and  hyperlipidemia. Diabetic ROS - medication compliance: compliant all of the time, diabetic diet compliance: compliant all of the time, home glucose monitoring: is not performed. Lab review: orders written for new lab studies as appropriate; see orders. Patient Active Problem List    Diagnosis Date Noted    Alzheimer's dementia with behavioral disturbance 08/20/2018    Advanced care planning/counseling discussion 08/18/2017    Advance care planning 03/21/2016    Hypercholesteremia 06/15/2010    Skin cancer 06/15/2010    CAD (coronary artery disease) 06/15/2010    HTN (hypertension) 06/15/2010    [facility] 06/15/2010    Smoker 06/15/2010     Current Outpatient Medications   Medication Sig Dispense Refill    [START ON 5/12/2019] dextroamphetamine-amphetamine (ADDERALL) 20 mg tablet Take 2 Tabs (40 mg total) by mouth dailyEarliest Fill Date: 5/12/19. Max Daily Amount: 40 mg 60 Tab 0    donepezil (ARICEPT) 10 mg tablet TAKE 1 TABLET BY MOUTH EVERY NIGHT 30 Tab 5    memantine (NAMENDA) 10 mg tablet TAKE 1 TABLET BY MOUTH TWICE DAILY 60 Tab 5    escitalopram oxalate (LEXAPRO) 20 mg tablet Take 1 Tab by mouth daily. 30 Tab 5    furosemide (LASIX) 20 mg tablet Take 1 Tab by mouth daily. 30 Tab 5    metoprolol succinate (TOPROL XL) 50 mg XL tablet Take 1 Tab by mouth daily. 90 Tab 3    niacin ER (NIASPAN) 500 mg tablet Take 1 Tab by mouth nightly.  10 Tab 0  rosuvastatin (CRESTOR) 20 mg tablet Take 1 Tab by mouth daily. 90 Tab 3    DOCOSAHEXANOIC ACID/EPA (FISH OIL PO) Take 1,200 mg by mouth daily.  PV W-O LAZARUS/FERROUS FUMARATE/FA (M-VIT PO) Take  by mouth.  POTASSIUM PO Take 550 mg by mouth.  OMEGA-3 FATTY ACIDS (OMEGA 3 PO) Take  by mouth.  aspirin (ASPIRIN) 325 mg tablet Take 325 mg by mouth daily. Family History   Problem Relation Age of Onset    Cancer Mother     Heart Disease Other      Social History     Tobacco Use    Smoking status: Current Every Day Smoker     Packs/day: 1.00    Smokeless tobacco: Never Used   Substance Use Topics    Alcohol use: Yes     Alcohol/week: 2.4 oz     Types: 4 Cans of beer per week           ROS  A comprehensive review of system was obtained and negative except findings in the HPI    Visit Vitals  /82 (BP 1 Location: Left arm, BP Patient Position: Sitting)   Pulse 75   Temp 98.2 °F (36.8 °C) (Oral)   Resp 16   Ht 6' 1\" (1.854 m)   Wt 188 lb (85.3 kg)   SpO2 96%   BMI 24.80 kg/m²     Physical Exam   Constitutional: He is oriented to person, place, and time. He appears well-developed and well-nourished. No distress. Cardiovascular: Normal rate and regular rhythm. No murmur heard. Pulmonary/Chest: Breath sounds normal. No respiratory distress. He has no wheezes. Musculoskeletal: He exhibits no edema. Neurological: He is alert and oriented to person, place, and time. Nursing note and vitals reviewed. ASSESSMENT and PLAN  Encounter Diagnoses   Name Primary?  Hypercholesteremia Yes    Type 2 diabetes mellitus without complication, without long-term current use of insulin ([facility] [address]      Orders Placed This Encounter    LIPID PANEL    HEMOGLOBIN A1C WITH EAG     Labs updated today  Follow up 3 mo if stable    I have discussed the diagnosis with the patient and the intended plan as seen in the above orders.  The patient has received an after-visit summary and questions were answered concerning future plans. Patient conveyed understanding of the plan at the time of the visit.     Hina Mock, MSN, ANP  2/27/2019

## 2019-02-28 LAB
CHOLEST SERPL-MCNC: 184 MG/DL (ref 100–199)
EST. AVERAGE GLUCOSE BLD GHB EST-MCNC: 140 MG/DL
HBA1C MFR BLD: 6.5 % (ref 4.8–5.6)
HDLC SERPL-MCNC: 62 MG/DL
INTERPRETATION, 910389: NORMAL
LDLC SERPL CALC-MCNC: 90 MG/DL (ref 0–99)
Lab: NORMAL
TRIGL SERPL-MCNC: 158 MG/DL (ref 0–149)
VLDLC SERPL CALC-MCNC: 32 MG/DL (ref 5–40)

## 2019-03-13 DIAGNOSIS — G30.9 ALZHEIMER'S DEMENTIA WITH BEHAVIORAL DISTURBANCE, UNSPECIFIED TIMING OF DEMENTIA ONSET: ICD-10-CM

## 2019-03-13 DIAGNOSIS — F02.81 ALZHEIMER'S DEMENTIA WITH BEHAVIORAL DISTURBANCE, UNSPECIFIED TIMING OF DEMENTIA ONSET: ICD-10-CM

## 2019-03-13 RX ORDER — ESCITALOPRAM OXALATE 10 MG/1
TABLET ORAL
Qty: 30 TAB | Refills: 0 | Status: SHIPPED | OUTPATIENT
Start: 2019-03-13 | End: 2019-04-13 | Stop reason: SDUPTHER

## 2019-04-13 DIAGNOSIS — F02.81 ALZHEIMER'S DEMENTIA WITH BEHAVIORAL DISTURBANCE, UNSPECIFIED TIMING OF DEMENTIA ONSET: ICD-10-CM

## 2019-04-13 DIAGNOSIS — G30.9 ALZHEIMER'S DEMENTIA WITH BEHAVIORAL DISTURBANCE, UNSPECIFIED TIMING OF DEMENTIA ONSET: ICD-10-CM

## 2019-04-15 RX ORDER — ESCITALOPRAM OXALATE 10 MG/1
TABLET ORAL
Qty: 30 TAB | Refills: 0 | Status: SHIPPED | OUTPATIENT
Start: 2019-04-15 | End: 2019-04-22

## 2019-04-22 ENCOUNTER — OFFICE VISIT (OUTPATIENT)
Dept: NEUROLOGY | Age: 69
End: 2019-04-22

## 2019-04-22 VITALS
TEMPERATURE: 98 F | OXYGEN SATURATION: 98 % | WEIGHT: 188 LBS | SYSTOLIC BLOOD PRESSURE: 128 MMHG | RESPIRATION RATE: 20 BRPM | DIASTOLIC BLOOD PRESSURE: 80 MMHG | HEIGHT: 73 IN | BODY MASS INDEX: 24.92 KG/M2 | HEART RATE: 66 BPM

## 2019-04-22 DIAGNOSIS — F90.0 ATTENTION DEFICIT HYPERACTIVITY DISORDER (ADHD), PREDOMINANTLY INATTENTIVE TYPE: Primary | ICD-10-CM

## 2019-04-22 RX ORDER — DEXTROAMPHETAMINE SACCHARATE, AMPHETAMINE ASPARTATE, DEXTROAMPHETAMINE SULFATE AND AMPHETAMINE SULFATE 7.5; 7.5; 7.5; 7.5 MG/1; MG/1; MG/1; MG/1
60 TABLET ORAL DAILY
Qty: 60 TAB | Refills: 0 | Status: SHIPPED | OUTPATIENT
Start: 2019-06-20 | End: 2019-08-09 | Stop reason: SDUPTHER

## 2019-04-22 RX ORDER — DEXTROAMPHETAMINE SACCHARATE, AMPHETAMINE ASPARTATE, DEXTROAMPHETAMINE SULFATE AND AMPHETAMINE SULFATE 7.5; 7.5; 7.5; 7.5 MG/1; MG/1; MG/1; MG/1
60 TABLET ORAL DAILY
Qty: 60 TAB | Refills: 0 | Status: SHIPPED | OUTPATIENT
Start: 2019-05-21 | End: 2019-04-22 | Stop reason: SDUPTHER

## 2019-04-22 RX ORDER — DEXTROAMPHETAMINE SACCHARATE, AMPHETAMINE ASPARTATE, DEXTROAMPHETAMINE SULFATE AND AMPHETAMINE SULFATE 7.5; 7.5; 7.5; 7.5 MG/1; MG/1; MG/1; MG/1
60 TABLET ORAL DAILY
Qty: 60 TAB | Refills: 0 | Status: SHIPPED | OUTPATIENT
Start: 2019-04-22 | End: 2019-04-22 | Stop reason: SDUPTHER

## 2019-04-22 NOTE — PROGRESS NOTES
Varun Woodard is a 71 y.o. male who presents with the following  Chief Complaint   Patient presents with    Dementia     Follow Up        HPI     Patient comes in with wife for a follow up for Dementia, anxiety. No real big changes since last visit.      Currently on Aricept 10 mg and namenda 10 mg BID and doing well with this. Feels his long term memory has been the same but his short term is getting worse still. Yesterday had Easter with family. Enjoyed with grandparents. She has to tell him things multiple times, will forget to take out trash will actually walk over it to do something else. Short term giving an example of he will get something out of the kitchen and will put it back in the wrong place. Is better with the increase to 40 mg Adderall in the morning the wife has noticed. Want to increase again. Wife is helping remember medications. Lexapro 20 mg and she has noticed  Less agitation, stress, irritation day to day has decreased and his mood is a lot better then before. He is calmer and his memory is actually showing to be better.     Wife will have to repeat herself, ask same questions. She does help him remember the medications day to day when he forgets even though they are on his bedside table. Overall feels like the changes have been positive. Still not sleeping in the bed. want to get a new bed soon.          No Known Allergies    Current Outpatient Medications   Medication Sig    [START ON 6/20/2019] dextroamphetamine-amphetamine (ADDERALL) 30 mg tablet Take 2 Tabs by mouth daily. Max Daily Amount: 2 Tabs.  donepezil (ARICEPT) 10 mg tablet TAKE 1 TABLET BY MOUTH EVERY NIGHT    memantine (NAMENDA) 10 mg tablet TAKE 1 TABLET BY MOUTH TWICE DAILY    escitalopram oxalate (LEXAPRO) 20 mg tablet Take 1 Tab by mouth daily.  furosemide (LASIX) 20 mg tablet Take 1 Tab by mouth daily.  metoprolol succinate (TOPROL XL) 50 mg XL tablet Take 1 Tab by mouth daily.     niacin ER (NIASPAN) 500 mg tablet Take 1 Tab by mouth nightly.  rosuvastatin (CRESTOR) 20 mg tablet Take 1 Tab by mouth daily.  DOCOSAHEXANOIC ACID/EPA (FISH OIL PO) Take 1,200 mg by mouth daily.  PV W-O LAZARUS/FERROUS FUMARATE/FA (M-VIT PO) Take  by mouth.  POTASSIUM PO Take 550 mg by mouth.  OMEGA-3 FATTY ACIDS (OMEGA 3 PO) Take  by mouth.  aspirin (ASPIRIN) 325 mg tablet Take 325 mg by mouth daily. No current facility-administered medications for this visit. Social History     Tobacco Use   Smoking Status Current Every Day Smoker    Packs/day: 1.00   Smokeless Tobacco Never Used       Past Medical History:   Diagnosis Date    [facility] [address]     CAD (coronary artery disease) 6/15/2010    DM (diabetes mellitus) ([facility] 6/15/2010    Falls     Heart disease     HTN (hypertension) 6/15/2010    Hypercholesteremia 6/15/2010    PTSD (post-traumatic stress disorder)     Skin cancer 6/15/2010    Smoker 6/15/2010    Snoring        Past Surgical History:   Procedure Laterality Date    CARDIAC SURG PROCEDURE UNLIST      stent       Family History   Problem Relation Age of Onset    Cancer Mother     Heart Disease Other        Social History     Socioeconomic History    Marital status:      Spouse name: Not on file    Number of children: Not on file    Years of education: Not on file    Highest education level: Not on file   Tobacco Use    Smoking status: Current Every Day Smoker     Packs/day: 1.00    Smokeless tobacco: Never Used   Substance and Sexual Activity    Alcohol use: Yes     Alcohol/week: 2.4 oz     Types: 4 Cans of beer per week    Drug use: No    Sexual activity: Never       Review of Systems   Eyes: Negative for blurred vision, double vision and photophobia. Gastrointestinal: Negative for nausea and vomiting. Neurological: Negative for dizziness, tingling, seizures, loss of consciousness and headaches. Psychiatric/Behavioral: Positive for memory loss.  Negative for depression, substance abuse and suicidal ideas. The patient is nervous/anxious. Remainder of comprehensive review is negative. Physical Exam :    Visit Vitals  /80 (BP 1 Location: Right arm, BP Patient Position: Sitting)   Pulse 66   Temp 98 °F (36.7 °C) (Oral)   Resp 20   Ht 6' 1\" (1.854 m)   Wt 85.3 kg (188 lb)   SpO2 98%   BMI 24.80 kg/m²       General: Well defined, nourished, and groomed individual in no acute distress.    Neck: Supple, nontender, no bruits, no pain with resistance to active range of motion.    Heart: Regular rate and rhythm, no murmurs, rub, or gallop. Normal S1S2. Lungs: Clear to auscultation bilaterally with equal chest expansion, no cough, no wheeze  Musculoskeletal: Extremities revealed no edema and had full range of motion of joints.    Psych: Good mood and bright affect    NEUROLOGICAL EXAMINATION:    Mental Status: Alert and oriented to person, place, and not time. MMSE 22    Cranial Nerves:    II, III, IV, VI: Visual acuity grossly intact. Visual fields are normal.    Pupils are equal, round, and reactive to light and accommodation.    Extra-ocular movements are full and fluid. Fundoscopic exam was benign, no ptosis or nystagmus.    V-XII: Hearing is grossly intact. Facial features are symmetric, with normal sensation and strength. The palate rises symmetrically and the tongue protrudes midline. Sternocleidomastoids 5/5. Motor Examination: Normal tone, bulk, and strength, 5/5 muscle strength throughout.      Coordination: Finger to nose was normal. No resting or intention tremor          Results for orders placed or performed in visit on 02/27/19   LIPID PANEL   Result Value Ref Range    Cholesterol, total 184 100 - 199 mg/dL    Triglyceride 158 (H) 0 - 149 mg/dL    HDL Cholesterol 62 >39 mg/dL    VLDL, calculated 32 5 - 40 mg/dL    LDL, calculated 90 0 - 99 mg/dL   HEMOGLOBIN A1C WITH EAG   Result Value Ref Range    Hemoglobin A1c 6.5 (H) 4.8 - 5.6 % Estimated average glucose 140 mg/dL   CVD REPORT   Result Value Ref Range    INTERPRETATION Note    DIABETES PATIENT EDUCATION   Result Value Ref Range    PDF Image Not applicable        Orders Placed This Encounter    DISCONTD: dextroamphetamine-amphetamine (ADDERALL) 30 mg tablet     Sig: Take 2 Tabs by mouth daily. Max Daily Amount: 2 Tabs. Dispense:  60 Tab     Refill:  0    DISCONTD: dextroamphetamine-amphetamine (ADDERALL) 30 mg tablet     Sig: Take 2 Tabs by mouth daily. Max Daily Amount: 2 Tabs. Dispense:  60 Tab     Refill:  0    dextroamphetamine-amphetamine (ADDERALL) 30 mg tablet     Sig: Take 2 Tabs by mouth daily. Max Daily Amount: 2 Tabs. Dispense:  60 Tab     Refill:  0       1. Attention deficit hyperactivity disorder (ADHD), predominantly inattentive type    2. Late onset AD     Follow-up and Dispositions    · Return in about 3 months (around 7/22/2019), or at  [facility].             Memory seems to be doing better with  Adderall and we will increase to 60 mg and Lexapro  20 mg and anxiety, restless better.      Keep these for further symptom management. Keep Aricept at 10 mg daily and Namenda at 10 mg BID for memory preservation as these are working well. Remember to stay active and keep the brain engaged. Keep physically active also. Try to sleep in the bed. Keep hydrated. Wife is taking good care of him and to call if anything changes.   Keep the brain active.                            This note will not be viewable in [employer]

## 2019-04-22 NOTE — PROGRESS NOTES
Patient is here for a follow up for dementia. He states that he has been feeling great, he has been doing great and doing things that he likes to do. He feels like the medication is working for him. No specific concerns today.

## 2019-04-23 DIAGNOSIS — G30.9 ALZHEIMER'S DEMENTIA WITH BEHAVIORAL DISTURBANCE, UNSPECIFIED TIMING OF DEMENTIA ONSET: ICD-10-CM

## 2019-04-23 DIAGNOSIS — F02.81 ALZHEIMER'S DEMENTIA WITH BEHAVIORAL DISTURBANCE, UNSPECIFIED TIMING OF DEMENTIA ONSET: ICD-10-CM

## 2019-04-23 RX ORDER — ESCITALOPRAM OXALATE 20 MG/1
TABLET ORAL
Qty: 30 TAB | Refills: 0 | Status: SHIPPED | OUTPATIENT
Start: 2019-04-23 | End: 2019-05-20 | Stop reason: SDUPTHER

## 2019-05-20 DIAGNOSIS — F02.81 ALZHEIMER'S DEMENTIA WITH BEHAVIORAL DISTURBANCE, UNSPECIFIED TIMING OF DEMENTIA ONSET: ICD-10-CM

## 2019-05-20 DIAGNOSIS — G30.9 ALZHEIMER'S DEMENTIA WITH BEHAVIORAL DISTURBANCE, UNSPECIFIED TIMING OF DEMENTIA ONSET: ICD-10-CM

## 2019-05-20 RX ORDER — ESCITALOPRAM OXALATE 20 MG/1
TABLET ORAL
Qty: 30 TAB | Refills: 0 | Status: SHIPPED | OUTPATIENT
Start: 2019-05-20 | End: 2019-06-17 | Stop reason: SDUPTHER

## 2019-06-17 DIAGNOSIS — F02.81 ALZHEIMER'S DEMENTIA WITH BEHAVIORAL DISTURBANCE, UNSPECIFIED TIMING OF DEMENTIA ONSET: ICD-10-CM

## 2019-06-17 DIAGNOSIS — G30.9 ALZHEIMER'S DEMENTIA WITH BEHAVIORAL DISTURBANCE, UNSPECIFIED TIMING OF DEMENTIA ONSET: ICD-10-CM

## 2019-06-17 RX ORDER — ESCITALOPRAM OXALATE 20 MG/1
TABLET ORAL
Qty: 30 TAB | Refills: 0 | Status: SHIPPED | OUTPATIENT
Start: 2019-06-17 | End: 2019-07-17 | Stop reason: SDUPTHER

## 2019-07-17 DIAGNOSIS — G30.9 ALZHEIMER'S DEMENTIA WITH BEHAVIORAL DISTURBANCE, UNSPECIFIED TIMING OF DEMENTIA ONSET: ICD-10-CM

## 2019-07-17 DIAGNOSIS — F02.81 ALZHEIMER'S DEMENTIA WITH BEHAVIORAL DISTURBANCE, UNSPECIFIED TIMING OF DEMENTIA ONSET: ICD-10-CM

## 2019-07-17 RX ORDER — ESCITALOPRAM OXALATE 20 MG/1
TABLET ORAL
Qty: 30 TAB | Refills: 0 | Status: SHIPPED | OUTPATIENT
Start: 2019-07-17 | End: 2019-08-14 | Stop reason: SDUPTHER

## 2019-07-18 ENCOUNTER — OFFICE VISIT (OUTPATIENT)
Dept: NEUROLOGY | Age: 69
End: 2019-07-18

## 2019-07-18 VITALS
DIASTOLIC BLOOD PRESSURE: 68 MMHG | SYSTOLIC BLOOD PRESSURE: 106 MMHG | WEIGHT: 188 LBS | HEART RATE: 85 BPM | HEIGHT: 73 IN | BODY MASS INDEX: 24.92 KG/M2 | OXYGEN SATURATION: 95 %

## 2019-07-18 DIAGNOSIS — G30.0 EARLY ONSET ALZHEIMER'S DEMENTIA WITHOUT BEHAVIORAL DISTURBANCE (HCC): Primary | ICD-10-CM

## 2019-07-18 DIAGNOSIS — F02.80 EARLY ONSET ALZHEIMER'S DEMENTIA WITHOUT BEHAVIORAL DISTURBANCE (HCC): Primary | ICD-10-CM

## 2019-07-18 RX ORDER — CLINDAMYCIN HYDROCHLORIDE 300 MG/1
CAPSULE ORAL
Refills: 0 | COMMUNITY
Start: 2019-07-02 | End: 2020-01-27

## 2019-07-18 RX ORDER — DONEPEZIL HYDROCHLORIDE 10 MG/1
20 TABLET, FILM COATED ORAL
Qty: 60 TAB | Refills: 5 | Status: SHIPPED | OUTPATIENT
Start: 2019-07-18 | End: 2019-07-18 | Stop reason: SDUPTHER

## 2019-07-18 RX ORDER — DONEPEZIL HYDROCHLORIDE 10 MG/1
20 TABLET, FILM COATED ORAL
Qty: 60 TAB | Refills: 5 | Status: SHIPPED | OUTPATIENT
Start: 2019-07-18 | End: 2019-08-14 | Stop reason: SDUPTHER

## 2019-07-18 NOTE — PROGRESS NOTES
Lyndsay Phan is a 71 y.o. male who presents with the following  Chief Complaint   Patient presents with    Dementia       HPI        Patient comes in with wife for a follow up for Dementia, anxiety. Feel like things are getting worse. He is having more trouble around the house, forgetting day to day things. Forgetting things from one room to the other. Wife will tell him something in the kitchen and he forgets in the next room.      Currently on Aricept 10 mg and namenda 10 mg BID and doing well with this. Feels his long term memory has been the same but his short term is getting worse still. Yesterday had Easter with family. Enjoyed with grandparents.      She has to tell him things multiple times, will forget to take out trash will actually walk over it to do something else. Short term giving an example of he will get something out of the kitchen and will put it back in the wrong place. Is better with the increase to 40 mg Adderall in the morning the wife has noticed. Want to increase again. Wife is helping remember medications.      Lexapro 20 mg and she has noticed  Less agitation, stress, irritation day to day has decreased and his mood is a lot better then before. He is calmer and his memory is actually showing to be better.      Wife will have to repeat herself, ask same questions. She does help him remember the medications day to day when he forgets even though they are on his bedside table. No Known Allergies    Current Outpatient Medications   Medication Sig    clindamycin (CLEOCIN) 300 mg capsule TK ONE C PO  Q 6 H    donepezil (ARICEPT) 10 mg tablet Take 2 Tabs by mouth nightly.  escitalopram oxalate (LEXAPRO) 20 mg tablet TAKE 1 TABLET BY MOUTH DAILY    dextroamphetamine-amphetamine (ADDERALL) 30 mg tablet Take 2 Tabs by mouth daily. Max Daily Amount: 2 Tabs.     donepezil (ARICEPT) 10 mg tablet TAKE 1 TABLET BY MOUTH EVERY NIGHT    memantine (NAMENDA) 10 mg tablet TAKE 1 TABLET BY MOUTH TWICE DAILY    escitalopram oxalate (LEXAPRO) 20 mg tablet Take 1 Tab by mouth daily.  furosemide (LASIX) 20 mg tablet Take 1 Tab by mouth daily.  metoprolol succinate (TOPROL XL) 50 mg XL tablet Take 1 Tab by mouth daily.  niacin ER (NIASPAN) 500 mg tablet Take 1 Tab by mouth nightly.  rosuvastatin (CRESTOR) 20 mg tablet Take 1 Tab by mouth daily.  DOCOSAHEXANOIC ACID/EPA (FISH OIL PO) Take 1,200 mg by mouth daily.  PV W-O LAZARUS/FERROUS FUMARATE/FA (M-VIT PO) Take  by mouth.  POTASSIUM PO Take 550 mg by mouth.  OMEGA-3 FATTY ACIDS (OMEGA 3 PO) Take  by mouth.  aspirin (ASPIRIN) 325 mg tablet Take 325 mg by mouth daily. No current facility-administered medications for this visit. Social History     Tobacco Use   Smoking Status Current Every Day Smoker    Packs/day: 1.00   Smokeless Tobacco Never Used       Past Medical History:   Diagnosis Date    [facility]     CAD (coronary artery disease) 6/15/2010    DM (diabetes mellitus) ([facility] 6/15/2010    Falls     Heart disease     HTN (hypertension) 6/15/2010    Hypercholesteremia 6/15/2010    PTSD (post-traumatic stress disorder)     Skin cancer 6/15/2010    Smoker 6/15/2010    Snoring        Past Surgical History:   Procedure Laterality Date    CARDIAC SURG PROCEDURE UNLIST      stent       Family History   Problem Relation Age of Onset    Cancer Mother     Heart Disease Other        Social History     Socioeconomic History    Marital status:      Spouse name: Not on file    Number of children: Not on file    Years of education: Not on file    Highest education level: Not on file   Tobacco Use    Smoking status: Current Every Day Smoker     Packs/day: 1.00    Smokeless tobacco: Never Used   Substance and Sexual Activity    Alcohol use:  Yes     Alcohol/week: 4.0 standard drinks     Types: 4 Cans of beer per week    Drug use: No    Sexual activity: Never       Review of Systems   Eyes: Negative for blurred vision, double vision and photophobia. Respiratory: Negative for shortness of breath and wheezing. Cardiovascular: Negative for chest pain and palpitations. Gastrointestinal: Negative for nausea and vomiting. Neurological: Negative for dizziness, tingling, seizures, loss of consciousness and headaches. Psychiatric/Behavioral: Positive for memory loss. Negative for depression, hallucinations, substance abuse and suicidal ideas. The patient is nervous/anxious. The patient does not have insomnia. Remainder of comprehensive review is negative. Physical Exam :    Visit Vitals  /68   Pulse 85   Ht 6' 1\" (1.854 m)   Wt 85.3 kg (188 lb)   SpO2 95%   BMI 24.80 kg/m²       General: Well defined, nourished, and groomed individual in no acute distress.    Neck: Supple, nontender, no bruits, no pain with resistance to active range of motion.    Heart: Regular rate and rhythm, no murmurs, rub, or gallop. Normal S1S2. Lungs: Clear to auscultation bilaterally with equal chest expansion, no cough, no wheeze  Musculoskeletal: Extremities revealed no edema and had full range of motion of joints.    Psych: Good mood and bright affect    NEUROLOGICAL EXAMINATION:    Mental Status: Alert and oriented to person, place, and not time. mmse 22     Cranial Nerves:    II, III, IV, VI: Visual acuity grossly intact. Visual fields are normal.    Pupils are equal, round, and reactive to light and accommodation.    Extra-ocular movements are full and fluid. Fundoscopic exam was benign, no ptosis or nystagmus.    V-XII: Hearing is grossly intact. Facial features are symmetric, with normal sensation and strength. The palate rises symmetrically and the tongue protrudes midline. Sternocleidomastoids 5/5. Motor Examination: Normal tone, bulk, and strength, 5/5 muscle strength throughout.      Coordination: Finger to nose was normal. No resting or intention tremor    Gait and Station: Steady while walking. Normal arm swing. No pronator drift. No muscle wasting or fasiculations noted. Reflexes: DTRs 2+ throughout. Results for orders placed or performed in visit on 02/27/19   LIPID PANEL   Result Value Ref Range    Cholesterol, total 184 100 - 199 mg/dL    Triglyceride 158 (H) 0 - 149 mg/dL    HDL Cholesterol 62 >39 mg/dL    VLDL, calculated 32 5 - 40 mg/dL    LDL, calculated 90 0 - 99 mg/dL   HEMOGLOBIN A1C WITH EAG   Result Value Ref Range    Hemoglobin A1c 6.5 (H) 4.8 - 5.6 %    Estimated average glucose 140 mg/dL   CVD REPORT   Result Value Ref Range    INTERPRETATION Note    DIABETES PATIENT EDUCATION   Result Value Ref Range    PDF Image Not applicable        Orders Placed This Encounter    CT HEAD WO CONT     Standing Status:   Future     Standing Expiration Date:   8/18/2020     Order Specific Question:   Is Patient Allergic to Contrast Dye? Answer:   No    EEG     Standing Status:   Future     Standing Expiration Date:   1/18/2020     Order Specific Question:   Reason for Exam:     Answer:   memory loss.  clindamycin (CLEOCIN) 300 mg capsule     Sig: TK ONE C PO  Q 6 H     Refill:  0    DISCONTD: donepezil (ARICEPT) 10 mg tablet     Sig: Take 2 Tabs by mouth nightly. Dispense:  60 Tab     Refill:  5    donepezil (ARICEPT) 10 mg tablet     Sig: Take 2 Tabs by mouth nightly. Dispense:  60 Tab     Refill:  5       1. Early onset Alzheimer's dementia without behavioral disturbance            Early onset AD with possible progression   CT head to look at causes of acute change looking at strokes. EEG to look for epilepsy or slowing. Increase Aricept to 20 mg nightly to help with further preservation of memory. Keep brain active. Make sure take Adderall both tablets in the morning. Call with any changes.        This note will not be viewable in [employer]

## 2019-07-23 ENCOUNTER — HOSPITAL ENCOUNTER (OUTPATIENT)
Dept: NEUROLOGY | Age: 69
Discharge: HOME | End: 2019-07-23
Attending: NURSE PRACTITIONER
Payer: MEDICARE

## 2019-07-23 ENCOUNTER — HOSPITAL ENCOUNTER (OUTPATIENT)
Dept: CT IMAGING | Age: 69
Discharge: HOME | End: 2019-07-23
Attending: NURSE PRACTITIONER
Payer: MEDICARE

## 2019-07-23 DIAGNOSIS — G30.9 ALZHEIMER'S DISEASE (HCC): ICD-10-CM

## 2019-07-23 DIAGNOSIS — G30.0 ALZHEIMER'S DISEASE, EARLY ONSET (HCC): ICD-10-CM

## 2019-07-23 DIAGNOSIS — F02.80 ALZHEIMER'S DISEASE, EARLY ONSET (HCC): ICD-10-CM

## 2019-07-23 DIAGNOSIS — F02.80 ALZHEIMER'S DISEASE (HCC): ICD-10-CM

## 2019-07-23 DIAGNOSIS — G30.0 EARLY ONSET ALZHEIMER'S DEMENTIA WITHOUT BEHAVIORAL DISTURBANCE (HCC): ICD-10-CM

## 2019-07-23 DIAGNOSIS — F02.80 EARLY ONSET ALZHEIMER'S DEMENTIA WITHOUT BEHAVIORAL DISTURBANCE (HCC): ICD-10-CM

## 2019-07-23 PROCEDURE — 95816 EEG AWAKE AND DROWSY: CPT

## 2019-07-23 PROCEDURE — 70450 CT HEAD/BRAIN W/O DYE: CPT

## 2019-07-30 RX ORDER — FUROSEMIDE 20 MG/1
TABLET ORAL
Qty: 30 TAB | Refills: 0 | Status: SHIPPED | OUTPATIENT
Start: 2019-07-30 | End: 2019-11-25

## 2019-08-06 DIAGNOSIS — F03.90 DEMENTIA WITHOUT BEHAVIORAL DISTURBANCE, UNSPECIFIED DEMENTIA TYPE: ICD-10-CM

## 2019-08-06 RX ORDER — MEMANTINE HYDROCHLORIDE 10 MG/1
TABLET ORAL
Qty: 180 TAB | Refills: 5 | Status: SHIPPED | OUTPATIENT
Start: 2019-08-06 | End: 2019-08-30 | Stop reason: SDUPTHER

## 2019-08-09 DIAGNOSIS — F90.0 ATTENTION DEFICIT HYPERACTIVITY DISORDER (ADHD), PREDOMINANTLY INATTENTIVE TYPE: ICD-10-CM

## 2019-08-09 RX ORDER — DEXTROAMPHETAMINE SACCHARATE, AMPHETAMINE ASPARTATE, DEXTROAMPHETAMINE SULFATE AND AMPHETAMINE SULFATE 7.5; 7.5; 7.5; 7.5 MG/1; MG/1; MG/1; MG/1
60 TABLET ORAL DAILY
Qty: 60 TAB | Refills: 0 | Status: SHIPPED | OUTPATIENT
Start: 2019-08-09 | End: 2019-11-25 | Stop reason: SDUPTHER

## 2019-08-09 NOTE — TELEPHONE ENCOUNTER
Pt is requesting a refill adderall   Pt is completely out of medication  Please call when ready for   [phone]

## 2019-08-14 DIAGNOSIS — F02.80 EARLY ONSET ALZHEIMER'S DEMENTIA WITHOUT BEHAVIORAL DISTURBANCE (HCC): ICD-10-CM

## 2019-08-14 DIAGNOSIS — G30.0 EARLY ONSET ALZHEIMER'S DEMENTIA WITHOUT BEHAVIORAL DISTURBANCE (HCC): ICD-10-CM

## 2019-08-14 DIAGNOSIS — F02.81 ALZHEIMER'S DEMENTIA WITH BEHAVIORAL DISTURBANCE, UNSPECIFIED TIMING OF DEMENTIA ONSET: ICD-10-CM

## 2019-08-14 DIAGNOSIS — G30.9 ALZHEIMER'S DEMENTIA WITH BEHAVIORAL DISTURBANCE, UNSPECIFIED TIMING OF DEMENTIA ONSET: ICD-10-CM

## 2019-08-19 RX ORDER — ESCITALOPRAM OXALATE 20 MG/1
TABLET ORAL
Qty: 90 TAB | Refills: 0 | Status: SHIPPED | OUTPATIENT
Start: 2019-08-19 | End: 2019-08-30 | Stop reason: SDUPTHER

## 2019-08-19 RX ORDER — DONEPEZIL HYDROCHLORIDE 10 MG/1
TABLET, FILM COATED ORAL
Qty: 180 TAB | Refills: 5 | Status: SHIPPED | OUTPATIENT
Start: 2019-08-19 | End: 2019-08-30 | Stop reason: SDUPTHER

## 2019-08-27 ENCOUNTER — TELEPHONE (OUTPATIENT)
Dept: NEUROLOGY | Age: 69
End: 2019-08-27

## 2019-08-27 NOTE — TELEPHONE ENCOUNTER
----- Message from Dmitri Head sent at 8/27/2019 12:05 PM EDT -----  Regarding: RAYMOND Arellano/ Telephone  Thaddeus Has would like a call back regarding her .  Contact is [phone]

## 2019-08-28 NOTE — TELEPHONE ENCOUNTER
R/t call to patient's wife, Lyn Persaud. Best contact [phone]. She would like a call from RAYMOND caldwell to discuss  before office visit 9/12/19.

## 2019-08-30 DIAGNOSIS — F03.90 DEMENTIA WITHOUT BEHAVIORAL DISTURBANCE, UNSPECIFIED DEMENTIA TYPE: ICD-10-CM

## 2019-08-30 DIAGNOSIS — F02.81 ALZHEIMER'S DEMENTIA WITH BEHAVIORAL DISTURBANCE, UNSPECIFIED TIMING OF DEMENTIA ONSET: ICD-10-CM

## 2019-08-30 DIAGNOSIS — G30.0 EARLY ONSET ALZHEIMER'S DEMENTIA WITHOUT BEHAVIORAL DISTURBANCE (HCC): ICD-10-CM

## 2019-08-30 DIAGNOSIS — F02.80 EARLY ONSET ALZHEIMER'S DEMENTIA WITHOUT BEHAVIORAL DISTURBANCE (HCC): ICD-10-CM

## 2019-08-30 DIAGNOSIS — G30.9 ALZHEIMER'S DEMENTIA WITH BEHAVIORAL DISTURBANCE, UNSPECIFIED TIMING OF DEMENTIA ONSET: ICD-10-CM

## 2019-08-30 RX ORDER — FUROSEMIDE 20 MG/1
TABLET ORAL
Qty: 90 TAB | Refills: 0 | Status: SHIPPED | OUTPATIENT
Start: 2019-08-30 | End: 2019-09-06 | Stop reason: SDUPTHER

## 2019-09-03 RX ORDER — ESCITALOPRAM OXALATE 20 MG/1
TABLET ORAL
Qty: 90 TAB | Refills: 0 | Status: SHIPPED | OUTPATIENT
Start: 2019-09-03 | End: 2020-07-28 | Stop reason: SDUPTHER

## 2019-09-03 RX ORDER — MEMANTINE HYDROCHLORIDE 10 MG/1
10 TABLET ORAL 2 TIMES DAILY
Qty: 180 TAB | Refills: 0 | Status: SHIPPED | OUTPATIENT
Start: 2019-09-03 | End: 2019-12-23

## 2019-09-03 RX ORDER — DONEPEZIL HYDROCHLORIDE 10 MG/1
TABLET, FILM COATED ORAL
Qty: 180 TAB | Refills: 0 | Status: SHIPPED | OUTPATIENT
Start: 2019-09-03 | End: 2020-04-10

## 2019-09-06 RX ORDER — FUROSEMIDE 20 MG/1
TABLET ORAL
Qty: 90 TAB | Refills: 3 | Status: SHIPPED | OUTPATIENT
Start: 2019-09-06 | End: 2019-12-30 | Stop reason: ALTCHOICE

## 2019-09-12 ENCOUNTER — OFFICE VISIT (OUTPATIENT)
Dept: NEUROLOGY | Age: 69
End: 2019-09-12

## 2019-09-12 VITALS
SYSTOLIC BLOOD PRESSURE: 90 MMHG | BODY MASS INDEX: 24.92 KG/M2 | WEIGHT: 188 LBS | HEART RATE: 95 BPM | HEIGHT: 73 IN | DIASTOLIC BLOOD PRESSURE: 58 MMHG | OXYGEN SATURATION: 94 %

## 2019-09-12 DIAGNOSIS — F03.90 DEMENTIA WITHOUT BEHAVIORAL DISTURBANCE, UNSPECIFIED DEMENTIA TYPE: Primary | ICD-10-CM

## 2019-09-12 RX ORDER — RISPERIDONE 0.5 MG/1
0.5 TABLET, FILM COATED ORAL 2 TIMES DAILY
Qty: 60 TAB | Refills: 5 | Status: SHIPPED | OUTPATIENT
Start: 2019-09-12 | End: 2019-09-12 | Stop reason: SDUPTHER

## 2019-09-12 RX ORDER — RISPERIDONE 0.5 MG/1
TABLET, FILM COATED ORAL
Qty: 180 TAB | Refills: 5 | Status: SHIPPED | OUTPATIENT
Start: 2019-09-12 | End: 2020-07-28

## 2019-09-13 NOTE — PROGRESS NOTES
Eyal Blanco is a 71 y.o. male who presents with the following  Chief Complaint   Patient presents with    Results     CT, EEG       HPI         Patient comes in for a follow up for CT and EEG. His wife has noticed things are a lot worse since last visit. He continues to hallucinate now. Talk to himself. MusicAll. Cut pictures of women out of magazines. Not notice anyone else is around. Not sure If he sleeps very well. States there are two women trying to get him out of his house. As a full recap, patient comes in with wife for a follow up for Dementia, anxiety.      Currently on Aricept 20 mg and namenda 10 mg BID and doing well with this. Feels his long term memory has been the same but his short term is getting worse still and worse.      She has to tell him things multiple times, will forget to take out trash will actually walk over it to do something else. Short term giving an example of he will get something out of the kitchen and will put it back in the wrong place. Is better with the increase to 40 mg Adderall in the morning the wife has noticed. Want to increase again. Wife is helping remember medications.      Lexapro 20 mg and she has noticed  Less agitation, stress, irritation day to day has decreased and his mood is a lot better then before. He is calmer and his memory is actually showing to be better.      Wife will have to repeat herself, ask same questions. She does help him remember the medications day to day when he forgets even though they are on his bedside table. Overall feels like the changes have been positive.  Still not sleeping in the bed. want to get a new bed soon.                  No Known Allergies    Current Outpatient Medications   Medication Sig    furosemide (LASIX) 20 mg tablet TAKE 1 TABLET BY MOUTH DAILY    escitalopram oxalate (LEXAPRO) 20 mg tablet TAKE 1 TABLET BY MOUTH DAILY    donepezil (ARICEPT) 10 mg tablet TAKE 2 TABLETS BY MOUTH EVERY NIGHT    memantine (NAMENDA) 10 mg tablet Take 1 Tab by mouth two (2) times a day.  dextroamphetamine-amphetamine (ADDERALL) 30 mg tablet Take 2 Tabs by mouth daily. Max Daily Amount: 2 Tabs.  clindamycin (CLEOCIN) 300 mg capsule TK ONE C PO  Q 6 H    metoprolol succinate (TOPROL XL) 50 mg XL tablet Take 1 Tab by mouth daily.  niacin ER (NIASPAN) 500 mg tablet Take 1 Tab by mouth nightly.  rosuvastatin (CRESTOR) 20 mg tablet Take 1 Tab by mouth daily.  DOCOSAHEXANOIC ACID/EPA (FISH OIL PO) Take 1,200 mg by mouth daily.  PV W-O LAZARUS/FERROUS FUMARATE/FA (M-VIT PO) Take  by mouth.  POTASSIUM PO Take 550 mg by mouth.  OMEGA-3 FATTY ACIDS (OMEGA 3 PO) Take  by mouth.  aspirin (ASPIRIN) 325 mg tablet Take 325 mg by mouth daily.  risperiDONE (RISPERDAL) 0.5 mg tablet TAKE 1 TABLET BY MOUTH TWICE DAILY    furosemide (LASIX) 20 mg tablet TAKE 1 TABLET BY MOUTH DAILY    donepezil (ARICEPT) 10 mg tablet TAKE 1 TABLET BY MOUTH EVERY NIGHT    escitalopram oxalate (LEXAPRO) 20 mg tablet Take 1 Tab by mouth daily. No current facility-administered medications for this visit.         Social History     Tobacco Use   Smoking Status Current Every Day Smoker    Packs/day: 1.00   Smokeless Tobacco Never Used       Past Medical History:   Diagnosis Date    [facility]     CAD (coronary artery disease) 6/15/2010    DM (diabetes mellitus) ([facility] 6/15/2010    Falls     Heart disease     HTN (hypertension) 6/15/2010    Hypercholesteremia 6/15/2010    PTSD (post-traumatic stress disorder)     Skin cancer 6/15/2010    Smoker 6/15/2010    Snoring        Past Surgical History:   Procedure Laterality Date    CARDIAC SURG PROCEDURE UNLIST      stent       Family History   Problem Relation Age of Onset    Cancer Mother     Heart Disease Other        Social History     Socioeconomic History    Marital status:      Spouse name: Not on file    Number of children: Not on file    Years of education: Not on file    Highest education level: Not on file   Tobacco Use    Smoking status: Current Every Day Smoker     Packs/day: 1.00    Smokeless tobacco: Never Used   Substance and Sexual Activity    Alcohol use: Yes     Alcohol/week: 4.0 standard drinks     Types: 4 Cans of beer per week    Drug use: No    Sexual activity: Never       Review of Systems   Eyes: Negative for blurred vision, double vision and photophobia. Respiratory: Negative for shortness of breath and wheezing. Gastrointestinal: Negative for nausea and vomiting. Neurological: Negative for dizziness, tingling and headaches. Psychiatric/Behavioral: Positive for hallucinations and memory loss. Negative for depression, substance abuse and suicidal ideas. The patient does not have insomnia. Remainder of comprehensive review is negative.      Physical Exam :    Visit Vitals  BP 90/58   Pulse 95   Ht 6' 1\" (1.854 m)   Wt 85.3 kg (188 lb)   SpO2 94%   BMI 24.80 kg/m²             Results for orders placed or performed in visit on 02/27/19   LIPID PANEL   Result Value Ref Range    Cholesterol, total 184 100 - 199 mg/dL    Triglyceride 158 (H) 0 - 149 mg/dL    HDL Cholesterol 62 >39 mg/dL    VLDL, calculated 32 5 - 40 mg/dL    LDL, calculated 90 0 - 99 mg/dL   HEMOGLOBIN A1C WITH EAG   Result Value Ref Range    Hemoglobin A1c 6.5 (H) 4.8 - 5.6 %    Estimated average glucose 140 mg/dL   CVD REPORT   Result Value Ref Range    INTERPRETATION Note    DIABETES PATIENT EDUCATION   Result Value Ref Range    PDF Image Not applicable        Orders Placed This Encounter    URINE CULTURE,COMPREHENSIVE    CBC WITH AUTOMATED DIFF    METABOLIC PANEL, COMPREHENSIVE    CBC WITH AUTOMATED DIFF    REFERRAL TO NEUROPSYCHOLOGY     Referral Priority:   Routine     Referral Type:   Consultation     Referral Reason:   Specialty Services Required     Referred to Provider:   Lyly Zhang PsyD    DISCONTD: risperiDONE (RISPERDAL) 0.5 mg tablet     Sig: Take 1 Tab by mouth two (2) times a day. Dispense:  60 Tab     Refill:  5       1. Dementia without behavioral disturbance, unspecified dementia type          Continued worsening dementia. Continue Aricept 20 mg and Namenda 10 mg BID. Continue Lexapro 20 mg. Continue Adderall 40 mg. Add Risperdal 0.5 mg BID To help with day to day hallucination, delusion. We discussed his CT and EEG. Will want to get blood also to look for infection as a cause of decline including UTI. Refer back to Dr. Chato Sagastume to evaluate for progression as things seem to have progressed very quickly over the past year. Keep as active as he can.            This note will not be viewable in [employer]

## 2019-09-16 DIAGNOSIS — E78.00 HYPERCHOLESTEREMIA: ICD-10-CM

## 2019-09-16 RX ORDER — ROSUVASTATIN CALCIUM 20 MG/1
TABLET, COATED ORAL
Qty: 90 TAB | Refills: 4 | Status: SHIPPED | OUTPATIENT
Start: 2019-09-16 | End: 2020-12-09

## 2019-09-17 LAB
ALBUMIN SERPL-MCNC: 4.7 G/DL (ref 3.6–4.8)
ALBUMIN/GLOB SERPL: 2.4 {RATIO} (ref 1.2–2.2)
ALP SERPL-CCNC: 78 IU/L (ref 39–117)
ALT SERPL-CCNC: 23 IU/L (ref 0–44)
AST SERPL-CCNC: 31 IU/L (ref 0–40)
BASOPHILS # BLD AUTO: 0.1 X10E3/UL (ref 0–0.2)
BASOPHILS NFR BLD AUTO: 1 %
BILIRUB SERPL-MCNC: <0.2 MG/DL (ref 0–1.2)
BUN SERPL-MCNC: 19 MG/DL (ref 8–27)
BUN/CREAT SERPL: 16 (ref 10–24)
CALCIUM SERPL-MCNC: 9.6 MG/DL (ref 8.6–10.2)
CHLORIDE SERPL-SCNC: 103 MMOL/L (ref 96–106)
CO2 SERPL-SCNC: 28 MMOL/L (ref 20–29)
CREAT SERPL-MCNC: 1.19 MG/DL (ref 0.76–1.27)
EOSINOPHIL # BLD AUTO: 0.3 X10E3/UL (ref 0–0.4)
EOSINOPHIL NFR BLD AUTO: 4 %
ERYTHROCYTE [DISTWIDTH] IN BLOOD BY AUTOMATED COUNT: 12.3 % (ref 12.3–15.4)
GLOBULIN SER CALC-MCNC: 2 G/DL (ref 1.5–4.5)
GLUCOSE SERPL-MCNC: 100 MG/DL (ref 65–99)
HCT VFR BLD AUTO: 44.3 % (ref 37.5–51)
HGB BLD-MCNC: 14.8 G/DL (ref 13–17.7)
IMM GRANULOCYTES # BLD AUTO: 0 X10E3/UL (ref 0–0.1)
IMM GRANULOCYTES NFR BLD AUTO: 0 %
LYMPHOCYTES # BLD AUTO: 3 X10E3/UL (ref 0.7–3.1)
LYMPHOCYTES NFR BLD AUTO: 44 %
MCH RBC QN AUTO: 32.2 PG (ref 26.6–33)
MCHC RBC AUTO-ENTMCNC: 33.4 G/DL (ref 31.5–35.7)
MCV RBC AUTO: 96 FL (ref 79–97)
MONOCYTES # BLD AUTO: 0.5 X10E3/UL (ref 0.1–0.9)
MONOCYTES NFR BLD AUTO: 8 %
NEUTROPHILS # BLD AUTO: 2.9 X10E3/UL (ref 1.4–7)
NEUTROPHILS NFR BLD AUTO: 43 %
PLATELET # BLD AUTO: 162 X10E3/UL (ref 150–450)
POTASSIUM SERPL-SCNC: 4.6 MMOL/L (ref 3.5–5.2)
PROT SERPL-MCNC: 6.7 G/DL (ref 6–8.5)
RBC # BLD AUTO: 4.6 X10E6/UL (ref 4.14–5.8)
SODIUM SERPL-SCNC: 145 MMOL/L (ref 134–144)
WBC # BLD AUTO: 6.8 X10E3/UL (ref 3.4–10.8)

## 2019-09-19 LAB — BACTERIA UR CULT: NORMAL

## 2019-10-04 ENCOUNTER — TELEPHONE (OUTPATIENT)
Dept: NEUROLOGY | Age: 69
End: 2019-10-04

## 2019-10-04 NOTE — TELEPHONE ENCOUNTER
----- Message from Ambrocio Fraga sent at 10/4/2019 11:33 AM EDT -----  Regarding: RAYMOND Arellano/telephone  General Message/    Caller's first and last name: John Cronin, pt's wife       Reason for call:has a questions for RAYMOND Arellano       Callback required yes/no and why:yes, for reason given above      Best contact number(s):  [phone]    Details to clarify the request:she would like to know if the results are back for her  , this is her 2nd request from this past Tuesday 10/1/19, from testing date of 9/12/19      Ambrocio Fraga

## 2019-10-07 NOTE — TELEPHONE ENCOUNTER
CBC is normal--------blood count, no infections.    Urine is clear no UTI    cmp is good- liver, kidneys, no abnornmalities

## 2019-10-08 ENCOUNTER — OFFICE VISIT (OUTPATIENT)
Dept: NEUROLOGY | Age: 69
End: 2019-10-08

## 2019-10-08 DIAGNOSIS — F10.11 HISTORY OF ALCOHOL ABUSE: ICD-10-CM

## 2019-10-08 DIAGNOSIS — R44.1 VISUAL HALLUCINATION: ICD-10-CM

## 2019-10-08 DIAGNOSIS — F41.1 GENERALIZED ANXIETY DISORDER: ICD-10-CM

## 2019-10-08 DIAGNOSIS — F02.81 ALZHEIMER'S DEMENTIA WITH BEHAVIORAL DISTURBANCE, UNSPECIFIED TIMING OF DEMENTIA ONSET: Primary | ICD-10-CM

## 2019-10-08 DIAGNOSIS — R45.4 IRRITABLE: ICD-10-CM

## 2019-10-08 DIAGNOSIS — F22 DELUSIONAL DISORDER (HCC): ICD-10-CM

## 2019-10-08 DIAGNOSIS — R44.0 AUDITORY HALLUCINATION: ICD-10-CM

## 2019-10-08 DIAGNOSIS — G30.9 ALZHEIMER'S DEMENTIA WITH BEHAVIORAL DISTURBANCE, UNSPECIFIED TIMING OF DEMENTIA ONSET: Primary | ICD-10-CM

## 2019-10-08 NOTE — PROGRESS NOTES
[address] [facility],5Th [address]  [address]   [address] [place] [address] [facility]   [phone] Office   [phone] Fax      Neuropsychology    Exam # 3    Initial Diagnostic Interview Note      Referral:  Jordan Broussard NP, Avelina Feliciano NP    Tiffanie Mcgee is a 71 y.o. right handed   male who was accompanied by his spouse  to the initial clinical interview on 10/8/19. Please refer to his medical records for details pertaining to his history. When I saw him last:       Tiffanie Mcgee is a 76 y.o. right handed   male who was accompanied by his spouse to the initial clinical interview on 8/27/184 . Please refer to his medical records for details pertaining to his history. When I saw him last: Jose Dowell Sr. is a 77 y.o.  right handed   male who was unaccompanied to the initial clinical interview on 1/5/17. Danuta Fernández refer to his medical records for details pertaining to his history.  Briefly, the patient reported that he completed high school and is a prior service [employer]. [employer] school early, went into the [employer] about age 16, and took some courses. Alida Damon obtained high school diploma.  For about a year now that he is aware, he has noticed increased forgetfulness getting worse over time.  Loss of memory. Alida Damon has had periods where he feels buzzed out. Sachi Lopez, did MMSE, and his recall was only 1/3.  Either he misunderstood what he was supposed to do or just forgot. Alida Damon doesn't know. Alida Damon says he only remembered the first word. Alida Damon does not recall specifics, thinks it was an activity.  Periods of time when he feels aggravated, embarrassed by it.  Doing things at home, he will forget his honey-do list.  Feels melancholy about it.  Lost $2000.00.  Lost a small gun/pistol that he had, and took a month to find that. Alida Damon forgets certain events.  Couldn't remember if he has had any major known neurologic problems.  Has gotten banged up in his head, didn't bleed or lose consciousness.  This happened maybe a month ago? Has some pain, arthritis issues.  Forgets what he's supposed to do.  Repeats himself. Drew Dawley his driving is not a problem (but see Dr. Chas Terry notes copied below).  Denied ever getting lost when driving or turned around.  Denied accidents. He gets assistance with medications, and spouse has always done the finances.  Feels off balance sometimes.  Has had some falls.  Appetite isn't too bad. Emotionally, he reports doing not bad, not like before. Not on any meds for mood.  No counseling/psychiatrist currently. Sleep is an issue. Not sleeping in same bed with spouse. Yee Pagan watch tv, nap, up and down at night.      History somewhat difficult to obtain, so I reviewed also Dr. Jarrett Ingram' notes, which are copied below.       From Dr. Jarrett Ingram: 51-year-old right-handed gentleman presents for evaluation today accompanied by his wife for evaluation of what he calls memory loss. He says he started to have trouble about 2-2.5 years ago and he believes things have gotten progressively worse. He says that his stepfather had dementia and he is seeing the same types of things and himself as he saw on his stepfather. He says he can remember long-term event such as things that occurred in [facility] but he has difficulty remembering short-term things. He has been evaluated with Lisa Glaser and he describes having a Mini-Mental status exam specifically a mini cog and notes that he had difficulty with the item recall only recalling 1 out of 3 items. His wife tells me that he repeats the same question over and over. He will tell the same story over and over. He will forget conversations. She relates the example of them going somewhere and then discussing it but then him asking over and over where they were going. He continues to drive. No issues with accidents or near accidents.  He was supposed to be over at a veterans event on the [address] [place] [address] on [facility]. His wife says that this is an area of town to go to a lot because there is a restaurant on [address] [place] where they like to eat. He was driving there and his wife actually put the directions into his phone for him as well and he was unable to find [facility]. He called a friend of his who was already there and still could not find his way. He also notes that in the middle of conversations he will forget what he is saying. He says that he misplaces items frequently as well. Terms of family history of dementia he does not know anything about his father's side of the family. He did not know his father at all. His mother as above was said to have some psychiatric issues. Essentially family history is not known in this regard.     My diagnostic impressions then included: This patient generated an abnormal range Neuropsychological Evaluation with respect to neurocognitive functioning.  In this regard, he is showing problems with mental status, verbal fluency, confrontation naming, sustained attention, processing speed, working memory, perceptual reasoning, verbal comprehension, dominant handed motor dexterity (a potentially lateralized issue for which neurologic correlation is indicated) auditory learning, auditory memory, and executive functioning.  Casual language skills will serve to mask underlying cognitive deficits at times.  Emotionally, the patient reported mild anxiety.  He reports a history of alcohol abuse when younger as well.                             In my opinion, this profile is consistent with an evolving organic process that is currently at a mild to moderate level of severity.  This is likely a combination of AD and vascular dementia.  I do not see this as FTD.  In addition to continued medical care, my recommendations include consideration for memory management medication.  I also recommend psychiatric medication management for anxiety.    The patient should be encouraged to remain as mentally, socially, and physically active as possible.                             The patient's generated cognitive difficulties are significant to the degree whereby it is my opinion that he should not live independently without appropriate supervision for those domains with a heavy memory emphasis.  This includes medication management supervision and supervision of financial dealings. We will need to monitor driving safety over time, and he should use a GPS device in the interim.   I do not believe that he is competent to manage his own affairs at this time. Baseline now established.  Follow up prn.   Clinical correlation is, of course, indicated.                           I will discuss these findings with the patient and family when they follow up with me in the near future.  A follow up Neuropsychological Evaluation is indicated on a prn basis.       DIAGNOSES:           Dementia -                WHWE To Moderate                                              Anxiety - Mild        Since I saw him last, the patient reported that he is now on Aricept and Namenda. Spouse says that his short term memory has been declining, but patient says it's bad but not worse. He has been driving without issues. He thinks he is an excellent . Spouse says he is not an excellent , and that she goes with him when he is driving. Spouse notes that she has to repeat himself over and over. He will forget to take the trash out and will walk over and do something else. He puts things back in the wrong place. Difficulties coming up with words. She helps him remember meds and does the finances. He gets stressed and agitated on a daily basis and she has to continue to remind him for things and this frustrates her. He gets overwhelmed and feels over stimulated. Watches tv during the day.   He is on Adderall which is helpful and he is more focused and gets things a bit done. The spouse and family say things are getting worse and he says he is no worse and no better from previous. There have been no new neurologic issues and no major changes medically or psychosocially since I saw him last.  No current legal troubles. Sleep is good. Appetite is good. Balance is a bit wobbly. No falls. No counseling or psychiatrist.        Diagnostic impressions then included: This patient again generated an abnormal range Neuropsychological Evaluation with respect to neurocognitive functioning. In this regard, he is showing problems with mental status, verbal fluency, confrontation naming, sustained attention, processing speed, working memory, perceptual reasoning, verbal comprehension, auditory learning, auditory memory, bilateral fine motor dexterity, and executive functioning. Casual language skills will serve to mask underlying cognitive deficits at times. Emotionally, the patient denied clinically significant psychopathology. The spouse reports that he is more irritable of late. He continues to consume alcohol but to a much less extent than previous. He gets overwhelmed and feels over stimulated at times. There is continued support for anxiety.                 At his last examination, dementia was identified and classified as within the mild to moderate range. It has progressed, and is now fully within the moderate range and clearly progressing. He is already on medications for memory and a medication for attention. I suggest continued treatment for the neurocognitive deficits and consider treatment for anxiety also. Monitor for aggression/frustration issues. He does not have an organic based impulsivity issue, and this is good news in that regard. He lacks insight and I again find him NOT competent to make medical decisions, financial decisions, to vote, to , or to own a firearm.   His sustained attention is severely impaired, and based on the trend of decline I strongly opine that he should not drive. He needs supervision for day-to-day household safety, nutritional/meal preparation, finance management, and medication management. He will likely require increased supervision over time and consider in-home health or transfer to assisted living should the spouse no longer be able to match his increased need for supervision over time. Consider respite care for her also. We now have baseline and updated data on him, and his prognosis is unfortunately guarded at best.  At the same time, he should be encouraged to remain as mentally, socially, and physically active as possible. Follow up in one year, or prn. Clinical correlation is, of course, indicated.                 I will discuss these findings with the patient and family when they follow up with me in the near future. A follow up Neuropsychological Evaluation is indicated on a prn basis.       DIAGNOSES: Dementia - Moderate (Progressing)                                                     Anxiety - Likely Mild                          Alcohol Abuse In partial remission       Today (10/8/19) Patient reports that his memory has slipped a bit but nothing major. He states he is driving. He is laughing today. Per the spouse, the cognitive functioning has declined significantly. Things are much worse. Forgetting content of conversations. Misplaces things. Loses words. He has been having a hard time concentrating, completing tasks. Things are generally a lot worse. He continues to hallucinations. He talks to himself, and will kiss magazines, and cuts pictures of women out of magazines. He has friends come and visit him (not real) and talks to pictures of women out of books. He says there are two women trying to get him out of his house. He is not sleeping very well. He states he has no emotions. She states there are no major behavioral problems, but he does get into mood.   He does not drive, per spouse, and she assists with medications and finances. His long term memory is fine. He is on Aricept and Namenda. He will forget to take the trash out. He did have an increase in his Adderall. He was put on Lexapro which has helped some with his stress and agitation. He continues to sleep in the recliner and not in the bed. No acute or focal issues. No new known neurologic concerns. Balance is okay and no falls. Spouse feels safe in the home. Clock is impaired today. TOPF is 35 and slow.       Status: Final result (Exam End: 7/23/2019 13:43) Provider Status: Reviewed   Study Result     EXAM: CT HEAD WO CONT     INDICATION: worsening memory loss, confusion     COMPARISON: None.     CONTRAST: None.     TECHNIQUE: Unenhanced CT of the head was performed using 5 mm images. Brain and  bone windows were generated. CT dose reduction was achieved through use of a  standardized protocol tailored for this examination and automatic exposure  control for dose modulation.       FINDINGS:  There is no extra-axial fluid collection hemorrhage shift or masses. There is  mild atrophy and some prominent periventricular nonspecific white matter  changes.      IMPRESSION  IMPRESSION: Atrophy and white matter disease. Neuropsychological Mental Status Exam (NMSE):  Historian: Fair  Praxis: No UE apraxia  R/L Orientation: Intact to self and to other  Dress: within normal limits   Weight: within normal limits   Appearance/Hygiene: within normal limits   Gait: within normal limits   Assistive Devices: Glasses  Mood: within normal limits   Affect: within normal limits   Comprehension: within normal limits   Thought Process:He is tangential today and redirection is helpful. Expressive Language: within normal limits   Receptive Language: within normal limits   Motor:  No cognitive or motor perseveration  ETOH: Since the last time I saw him, he has stopped. Tobacco: Cut back quite a bit and down to ten a day.   Counseling to quit completely given  Illicit: Denied  SI/HI: Denied  Psychosis: See above  Insight: Poor  Judgment: Poor  Other Psych: He is more calm today than he was last time when his spouse is discussing his memory issues.           Past Medical History:   Diagnosis Date    [facility]ism ([facility] [address]     CAD (coronary artery disease) 6/15/2010    DM (diabetes mellitus) ([facility] [address] 6/15/2010    Falls     Heart disease     HTN (hypertension) 6/15/2010    Hypercholesteremia 6/15/2010    PTSD (post-traumatic stress disorder)     Skin cancer 6/15/2010    Smoker 6/15/2010    Snoring        Past Surgical History:   Procedure Laterality Date    CARDIAC SURG PROCEDURE UNLIST      stent       No Known Allergies    Family History   Problem Relation Age of Onset    Cancer Mother     Heart Disease Other        Social History     Tobacco Use    Smoking status: Current Every Day Smoker     Packs/day: 1.00    Smokeless tobacco: Never Used   Substance Use Topics    Alcohol use: Yes     Alcohol/week: 4.0 standard drinks     Types: 4 Cans of beer per week    Drug use: No       Current Outpatient Medications   Medication Sig Dispense Refill    rosuvastatin (CRESTOR) 20 mg tablet TAKE 1 TABLET DAILY 90 Tab 4    risperiDONE (RISPERDAL) 0.5 mg tablet TAKE 1 TABLET BY MOUTH TWICE DAILY 180 Tab 5    furosemide (LASIX) 20 mg tablet TAKE 1 TABLET BY MOUTH DAILY 90 Tab 3    escitalopram oxalate (LEXAPRO) 20 mg tablet TAKE 1 TABLET BY MOUTH DAILY 90 Tab 0    donepezil (ARICEPT) 10 mg tablet TAKE 2 TABLETS BY MOUTH EVERY NIGHT 180 Tab 0    memantine (NAMENDA) 10 mg tablet Take 1 Tab by mouth two (2) times a day. 180 Tab 0    dextroamphetamine-amphetamine (ADDERALL) 30 mg tablet Take 2 Tabs by mouth daily. Max Daily Amount: 2 Tabs.  60 Tab 0    furosemide (LASIX) 20 mg tablet TAKE 1 TABLET BY MOUTH DAILY 30 Tab 0    clindamycin (CLEOCIN) 300 mg capsule TK ONE C PO  Q 6 H  0    donepezil (ARICEPT) 10 mg tablet TAKE 1 TABLET BY MOUTH EVERY NIGHT 30 Tab 5    escitalopram oxalate (LEXAPRO) 20 mg tablet Take 1 Tab by mouth daily. 30 Tab 5    metoprolol succinate (TOPROL XL) 50 mg XL tablet Take 1 Tab by mouth daily. 90 Tab 3    niacin ER (NIASPAN) 500 mg tablet Take 1 Tab by mouth nightly. 10 Tab 0    DOCOSAHEXANOIC ACID/EPA (FISH OIL PO) Take 1,200 mg by mouth daily.  PV W-O LAZARUS/FERROUS FUMARATE/FA (M-VIT PO) Take  by mouth.  POTASSIUM PO Take 550 mg by mouth.  OMEGA-3 FATTY ACIDS (OMEGA 3 PO) Take  by mouth.  aspirin (ASPIRIN) 325 mg tablet Take 325 mg by mouth daily. Plan:  Obtain authorization for testing from insurance company. Report to follow once testing, scoring, and interpretation completed. ? Organic based neurocognitive issues versus mood disorder or combination of same. ? Problems organic, functional, or both? This note will not be viewable in [address] [id number]*4 [id number]*2 1 hour 45 spent today reviewing all of his previous records and mental status and consult with neuro and time with patient and time with spouse.

## 2019-10-24 ENCOUNTER — OFFICE VISIT (OUTPATIENT)
Dept: NEUROLOGY | Age: 69
End: 2019-10-24

## 2019-10-24 DIAGNOSIS — F02.818 LATE ONSET ALZHEIMER'S DISEASE WITH BEHAVIORAL DISTURBANCE (HCC): Primary | ICD-10-CM

## 2019-10-24 DIAGNOSIS — F10.11 HISTORY OF ALCOHOL ABUSE: ICD-10-CM

## 2019-10-24 DIAGNOSIS — R44.0 AUDITORY HALLUCINATION: ICD-10-CM

## 2019-10-24 DIAGNOSIS — G30.1 LATE ONSET ALZHEIMER'S DISEASE WITH BEHAVIORAL DISTURBANCE (HCC): Primary | ICD-10-CM

## 2019-10-24 DIAGNOSIS — R45.4 IRRITABLE: ICD-10-CM

## 2019-10-24 DIAGNOSIS — F41.1 GENERALIZED ANXIETY DISORDER: ICD-10-CM

## 2019-10-24 DIAGNOSIS — R44.1 VISUAL HALLUCINATION: ICD-10-CM

## 2019-10-24 NOTE — LETTER
10/25/19 Patient: Brooklynn Bull Sr. YOB: 1950 Date of Visit: 10/24/2019 Thomas Davila NP 
[address] [place] [id number] [place] [phone] VIA In Basket Dear Thomas Davila NP, Thank you for referring Mr. Eligio Apgar to [facility] for evaluation. My notes for this consultation are attached. If you have questions, please do not hesitate to call me. I look forward to following your patient along with you. Sincerely, Hussain Culp PsyD

## 2019-10-25 NOTE — PROGRESS NOTES
[address] [facility],[address]  [address]   [place] [address] [place] [address] [facility]i Al Wesly Elana Lock   [phone] Office   [phone] Fax      Neuropsychological Evaluation Report    Exam # 3  Referral:  Dee Moore NP, Bunny Rodriguez NP    Herve Espino. is a 71 y.o. right handed   male who was accompanied by his spouse  to the initial clinical interview on 10/8/19. Please refer to his medical records for details pertaining to his history. When I saw him last:       Herve Melendeznick. is a 76 y.o. right handed   male who was accompanied by his spouse to the initial clinical interview on 8/27/184 . Please refer to his medical records for details pertaining to his history. When I saw him last: Loulou Glasgow Sr. is a 77 y.o.  right handed   male who was unaccompanied to the initial clinical interview on 1/5/17. Jess Mera refer to his medical records for details pertaining to his history.  Briefly, the patient reported that he completed high school and is a prior service [employer]. [employer] early, went into the [employer] about age 16, and took some courses. Carina Ortiz obtained high school diploma.  For about a year now that he is aware, he has noticed increased forgetfulness getting worse over time.  Loss of memory. Carina Ortiz has had periods where he feels buzzed out. Billye Jeans, did MMSE, and his recall was only 1/3.  Either he misunderstood what he was supposed to do or just forgot. Carina Ortiz doesn't know. Carina Ortiz says he only remembered the first word. Carina Ortiz does not recall specifics, thinks it was an activity.  Periods of time when he feels aggravated, embarrassed by it.  Doing things at home, he will forget his honey-do list.  Feels melancholy about it.  Lost $2000.00.  Lost a small gun/pistol that he had, and took a month to find that. Carina Ortiz forgets certain events.  Couldn't remember if he has had any major known neurologic problems.  Has gotten banged up in his head, didn't bleed or lose consciousness.  This happened maybe a month ago? Has some pain, arthritis issues.  Forgets what he's supposed to do.  Repeats himself. Jane Garcia his driving is not a problem (but see Dr. Harsh Kauffman notes copied below).  Denied ever getting lost when driving or turned around.  Denied accidents. He gets assistance with medications, and spouse has always done the finances.  Feels off balance sometimes.  Has had some falls.  Appetite isn't too bad. Emotionally, he reports doing not bad, not like before. Not on any meds for mood.  No counseling/psychiatrist currently. Sleep is an issue. Not sleeping in same bed with spouse. Asha George watch tv, nap, up and down at night.      History somewhat difficult to obtain, so I reviewed also Dr. Jhoan Irizarry' notes, which are copied below.       From Dr. Jhoan Irizarry: 49-year-old right-handed gentleman presents for evaluation today accompanied by his wife for evaluation of what he calls memory loss. He says he started to have trouble about 2-2.5 years ago and he believes things have gotten progressively worse. He says that his stepfather had dementia and he is seeing the same types of things and himself as he saw on his stepfather. He says he can remember long-term event such as things that occurred in [facility] but he has difficulty remembering short-term things. He has been evaluated with Dot Liu and he describes having a Mini-Mental status exam specifically a mini cog and notes that he had difficulty with the item recall only recalling 1 out of 3 items. His wife tells me that he repeats the same question over and over. He will tell the same story over and over. He will forget conversations. She relates the example of them going somewhere and then discussing it but then him asking over and over where they were going. He continues to drive. No issues with accidents or near accidents.  He was supposed to be over at a veterans event on the [address] [place] [address] on [facility]. His wife says that this is an area of town to go to a lot because there is a restaurant on [address] where they like to eat. He was driving there and his wife actually put the directions into his phone for him as well and he was unable to find [facility]. He called a friend of his who was already there and still could not find his way. He also notes that in the middle of conversations he will forget what he is saying. He says that he misplaces items frequently as well. Terms of family history of dementia he does not know anything about his father's side of the family. He did not know his father at all. His mother as above was said to have some psychiatric issues. Essentially family history is not known in this regard.     My diagnostic impressions then included: This patient generated an abnormal range Neuropsychological Evaluation with respect to neurocognitive functioning.  In this regard, he is showing problems with mental status, verbal fluency, confrontation naming, sustained attention, processing speed, working memory, perceptual reasoning, verbal comprehension, dominant handed motor dexterity (a potentially lateralized issue for which neurologic correlation is indicated) auditory learning, auditory memory, and executive functioning.  Casual language skills will serve to mask underlying cognitive deficits at times.  Emotionally, the patient reported mild anxiety.  He reports a history of alcohol abuse when younger as well.                             In my opinion, this profile is consistent with an evolving organic process that is currently at a mild to moderate level of severity.  This is likely a combination of AD and vascular dementia.  I do not see this as FTD.  In addition to continued medical care, my recommendations include consideration for memory management medication.  I also recommend psychiatric medication management for anxiety.    The patient should be encouraged to remain as mentally, socially, and physically active as possible.                             The patient's generated cognitive difficulties are significant to the degree whereby it is my opinion that he should not live independently without appropriate supervision for those domains with a heavy memory emphasis.  This includes medication management supervision and supervision of financial dealings. We will need to monitor driving safety over time, and he should use a GPS device in the interim.   I do not believe that he is competent to manage his own affairs at this time. Baseline now established.  Follow up prn.   Clinical correlation is, of course, indicated.                           I will discuss these findings with the patient and family when they follow up with me in the near future.  A follow up Neuropsychological Evaluation is indicated on a prn basis.       DIAGNOSES:           Dementia -                RYZP To Moderate                                              Anxiety - Mild        Since I saw him last, the patient reported that he is now on Aricept and Namenda. Spouse says that his short term memory has been declining, but patient says it's bad but not worse. He has been driving without issues. He thinks he is an excellent . Spouse says he is not an excellent , and that she goes with him when he is driving. Spouse notes that she has to repeat himself over and over. He will forget to take the trash out and will walk over and do something else. He puts things back in the wrong place. Difficulties coming up with words. She helps him remember meds and does the finances. He gets stressed and agitated on a daily basis and she has to continue to remind him for things and this frustrates her. He gets overwhelmed and feels over stimulated. Watches tv during the day. He is on Adderall which is helpful and he is more focused and gets things a bit done.   The spouse and family say things are getting worse and he says he is no worse and no better from previous. There have been no new neurologic issues and no major changes medically or psychosocially since I saw him last.  No current legal troubles. Sleep is good. Appetite is good. Balance is a bit wobbly. No falls. No counseling or psychiatrist.        Diagnostic impressions then included: This patient again generated an abnormal range Neuropsychological Evaluation with respect to neurocognitive functioning. In this regard, he is showing problems with mental status, verbal fluency, confrontation naming, sustained attention, processing speed, working memory, perceptual reasoning, verbal comprehension, auditory learning, auditory memory, bilateral fine motor dexterity, and executive functioning. Casual language skills will serve to mask underlying cognitive deficits at times. Emotionally, the patient denied clinically significant psychopathology. The spouse reports that he is more irritable of late. He continues to consume alcohol but to a much less extent than previous. He gets overwhelmed and feels over stimulated at times. There is continued support for anxiety.                 At his last examination, dementia was identified and classified as within the mild to moderate range. It has progressed, and is now fully within the moderate range and clearly progressing. He is already on medications for memory and a medication for attention. I suggest continued treatment for the neurocognitive deficits and consider treatment for anxiety also. Monitor for aggression/frustration issues. He does not have an organic based impulsivity issue, and this is good news in that regard. He lacks insight and I again find him NOT competent to make medical decisions, financial decisions, to vote, to , or to own a firearm.   His sustained attention is severely impaired, and based on the trend of decline I strongly opine that he should not drive. He needs supervision for day-to-day household safety, nutritional/meal preparation, finance management, and medication management. He will likely require increased supervision over time and consider in-home health or transfer to assisted living should the spouse no longer be able to match his increased need for supervision over time. Consider respite care for her also. We now have baseline and updated data on him, and his prognosis is unfortunately guarded at best.  At the same time, he should be encouraged to remain as mentally, socially, and physically active as possible. Follow up in one year, or prn. Clinical correlation is, of course, indicated.                 I will discuss these findings with the patient and family when they follow up with me in the near future. A follow up Neuropsychological Evaluation is indicated on a prn basis.       DIAGNOSES: Dementia - Moderate (Progressing)                                                     Anxiety - Likely Mild                          Alcohol Abuse In partial remission       Today (10/8/19) Patient reports that his memory has slipped a bit but nothing major. He states he is driving. He is laughing today. Per the spouse, the cognitive functioning has declined significantly. Things are much worse. Forgetting content of conversations. Misplaces things. Loses words. He has been having a hard time concentrating, completing tasks. Things are generally a lot worse. He continues to hallucinations. He talks to himself, and will kiss magazines, and cuts pictures of women out of magazines. He has friends come and visit him (not real) and talks to pictures of women out of books. He says there are two women trying to get him out of his house. He is not sleeping very well. He states he has no emotions. She states there are no major behavioral problems, but he does get into mood.   He does not drive, per spouse, and she assists with medications and finances. His long term memory is fine. He is on Aricept and Namenda. He will forget to take the trash out. He did have an increase in his Adderall. He was put on Lexapro which has helped some with his stress and agitation. He continues to sleep in the recliner and not in the bed. No acute or focal issues. No new known neurologic concerns. Balance is okay and no falls. Spouse feels safe in the home. Clock is impaired today. TOPF is 35 and slow.       Status: Final result (Exam End: 7/23/2019 13:43) Provider Status: Reviewed   Study Result     EXAM: CT HEAD WO CONT     INDICATION: worsening memory loss, confusion     COMPARISON: None.     CONTRAST: None.     TECHNIQUE: Unenhanced CT of the head was performed using 5 mm images. Brain and  bone windows were generated. CT dose reduction was achieved through use of a  standardized protocol tailored for this examination and automatic exposure  control for dose modulation.       FINDINGS:  There is no extra-axial fluid collection hemorrhage shift or masses. There is  mild atrophy and some prominent periventricular nonspecific white matter  changes.      IMPRESSION  IMPRESSION: Atrophy and white matter disease. Neuropsychological Mental Status Exam (NMSE):  Historian: Fair  Praxis: No UE apraxia  R/L Orientation: Intact to self and to other  Dress: within normal limits   Weight: within normal limits   Appearance/Hygiene: within normal limits   Gait: within normal limits   Assistive Devices: Glasses  Mood: within normal limits   Affect: within normal limits   Comprehension: within normal limits   Thought Process:He is tangential today and redirection is helpful. Expressive Language: within normal limits   Receptive Language: within normal limits   Motor:  No cognitive or motor perseveration  ETOH: Since the last time I saw him, he has stopped. Tobacco: Cut back quite a bit and down to ten a day.   Counseling to quit completely given  Illicit: Denied  SI/HI: Denied  Psychosis: See above  Insight: Poor  Judgment: Poor  Other Psych: He is more calm today than he was last time when his spouse is discussing his memory issues.           Past Medical History:   Diagnosis Date    [facility]ism ([facility]     CAD (coronary artery disease) 6/15/2010    [facility] 6[facility]2010    Falls     Heart disease     HTN (hypertension) 6/15/2010    Hypercholesteremia 6/15/2010    PTSD (post-traumatic stress disorder)     Skin cancer 6/15/2010    Smoker 6/15/2010    Snoring        Past Surgical History:   Procedure Laterality Date    CARDIAC SURG PROCEDURE UNLIST      stent       No Known Allergies    Family History   Problem Relation Age of Onset    Cancer Mother     Heart Disease Other        Social History     Tobacco Use    Smoking status: Current Every Day Smoker     Packs/day: 1.00    Smokeless tobacco: Never Used   Substance Use Topics    Alcohol use: Yes     Alcohol/week: 4.0 standard drinks     Types: 4 Cans of beer per week    Drug use: No       Current Outpatient Medications   Medication Sig Dispense Refill    rosuvastatin (CRESTOR) 20 mg tablet TAKE 1 TABLET DAILY 90 Tab 4    risperiDONE (RISPERDAL) 0.5 mg tablet TAKE 1 TABLET BY MOUTH TWICE DAILY 180 Tab 5    furosemide (LASIX) 20 mg tablet TAKE 1 TABLET BY MOUTH DAILY 90 Tab 3    escitalopram oxalate (LEXAPRO) 20 mg tablet TAKE 1 TABLET BY MOUTH DAILY 90 Tab 0    donepezil (ARICEPT) 10 mg tablet TAKE 2 TABLETS BY MOUTH EVERY NIGHT 180 Tab 0    memantine (NAMENDA) 10 mg tablet Take 1 Tab by mouth two (2) times a day. 180 Tab 0    dextroamphetamine-amphetamine (ADDERALL) 30 mg tablet Take 2 Tabs by mouth daily. Max Daily Amount: 2 Tabs.  60 Tab 0    furosemide (LASIX) 20 mg tablet TAKE 1 TABLET BY MOUTH DAILY 30 Tab 0    clindamycin (CLEOCIN) 300 mg capsule TK ONE C PO  Q 6 H  0    donepezil (ARICEPT) 10 mg tablet TAKE 1 TABLET BY MOUTH EVERY NIGHT 30 Tab 5    escitalopram oxalate (LEXAPRO) 20 mg tablet Take 1 Tab by mouth daily. 30 Tab 5    metoprolol succinate (TOPROL XL) 50 mg XL tablet Take 1 Tab by mouth daily. 90 Tab 3    niacin ER (NIASPAN) 500 mg tablet Take 1 Tab by mouth nightly. 10 Tab 0    DOCOSAHEXANOIC ACID/EPA (FISH OIL PO) Take 1,200 mg by mouth daily.  PV W-O LAZARUS/FERROUS FUMARATE/FA (M-VIT PO) Take  by mouth.  POTASSIUM PO Take 550 mg by mouth.  OMEGA-3 FATTY ACIDS (OMEGA 3 PO) Take  by mouth.  aspirin (ASPIRIN) 325 mg tablet Take 325 mg by mouth daily. Plan:  Obtain authorization for testing from insurance company. Report to follow once testing, scoring, and interpretation completed. ? Organic based neurocognitive issues versus mood disorder or combination of same. ? Problems organic, functional, or both? This note will not be viewable in [address] [address]*8 [id number] 1 hour 45 spent today reviewing all of his previous records and mental status and consult with neuro and time with patient and time with spouse. Neuropsychological Evaluation  Patient Testing 10/24/19 Report Completed 10/25/19  A Psychometrist Assisted w/ portions of this evaluation while under my direct supervision    Please refer to the patient's initial interview progress note (copied above) and her medical records for details pertaining to her history. Today's neuropsychological test scores follow: The following assessment procedures were performed:      Neuropsychologist Performed, Interpreted, & Reported: Neuropsychological Mental Status Exam, Revised Memory & Behavior Checklist, Mini Mental State Exam, Clock Drawing Test, Test Of Premorbid Functioning, Elise-Melzack Pain Questionnaire,  History Taking  & Clinical Interview With The Patient, Additional History Taking w/ The Patient's Spouse, ABAS-3, CASE, CPT-III, [place],  Review Of Available Records.     Psychometrist Administered & Neuropsychologist Interpreted & Neuropsychologist Reported: Verbal Fluency Tests, Emerson & Emerson  Revised,  Repeatable Battery For The Assessment Of Neuropsychological Status, [place] Dementia Rating Scale  2, WASI-II, Trailmaking Test Parts A & B, Geriatric Depression Inventory, Garza Anxiety Inventory, Rose Symptom Checklist.     Test Findings:  Note:  The patients raw data have been compared with currently available norms which include demographic corrections for age, gender, and/or education. Sometimes, the patients scores are compared to demographically similar individuals as close to the patients age, education level, etc., as possible. \"Average\" is viewed as being +/- 1 standard deviation (SD) from the stated mean for a particular test score. \"Low average\" is viewed as being between 1 and 2 SD below the mean, and above average is viewed as being 1 and 2 SD above the mean. Scores falling in the borderline range (between 1-1/2 and 2 SD below the mean) are viewed with particular attention as to whether they are normal or abnormal neurocognitive test scores. Other methods of inference in analyzing the test data are also utilized, including the pattern and range of scores in the profile, bilateral motor functions, and the presence, if any, of pathognomonic signs. Behaviorally, the patient was friendly and cooperative and appeared motivated to perform well during this examination. Testing him was extremely difficult and there is a marked decline in his cognitive functioning clearly evidence from my recollection of him last time to previous. Numerous tests were attempted and discontinued. I am reporting those tests that I believe to be valid only. Within this context, the results of this evaluation are viewed as a valid reflection of the patients actual neurocognitive and emotional status. The patient's score of 3/30 on the Mini-Mental Status Exam was impaired.   In this regard, he was not oriented to year, season month, day, date, state, [facility], [facility], or floor. Registration of three words was 0/3 correct on Trial 1. Backwards spelling was 0/5 correct. Recall 0/3. Naming 0/2. Repetition was impaired. Three step command was 1/3 correct. Reading, writing, and visual construction abilities were impaired. Clock drawing was severely impaired. His structured word list fluency, as assessed by the FAS Test, was within the moderately to severely impaired range with a T score of 12. Category fluency was within the severely impaired range with a T score of 14. Confrontation naming ability, as assessed by the [facility] Revised, was within the moderately to severely impaired range at 2/60 correct (T = 21). This pattern of performance is indicative of a patient who is at increased risk for day-to-day problems with verbal fluency. Confrontation naming ability was also impaired. The patient was administered the Test of Nonverbal Intelligence -4 and generated a poor range IQ Index score of 72 (3rd %ile). This is lower than what would be predicted given his performance on a task estimating premorbid functioning levels, and this pattern of performance is suggestive of a decline from premorbid Intellectual Functioning. The patient was administered the CPT-III and review of the subscales revealed severe problems with inattentiveness without concerns for problems with impulsivity. This pattern of performance is indicative of a patient who is at increased risk for day-to-day problems with sustained visual attention/concentration. The patient was administered the [place] Dementia Rating Scale -2 and his total score of 22/144 correct corresponds to an age- and education- corrected MOANS Scaled Score of 2 (<1st %ile) and indicates a severely impaired level of performance.   In this regard, his performance across all neurocognitive domains assessed, including initiation/perseveration, simple attention, construction, conceptualization, and memory capacity were all impaired. This pattern of performance is indicative of a patient who is at increased risk for day-to-day problems across a variety of neurocognitive domains. The patient was administered the Repeatable Battery for the Assessment Of Neuropsychological Status (RBANS) but this test was discontinued due to his severe cognitive deficits. The WASI-II was also discontinued, and the ZEINAB-IV was administered instead (See above)      Simple timed visual motor sequencing (Trailmaking Test Part A) was within the moderately to severely impaired range with a T score of 21. His performance on a similar, but more complex task of timed visual motor sequencing (Trailmaking Test Part B) was within the moderately to severely impaired range with a T score of 21. This latter test was discontinued. This pattern of performance is indicative of a patient who is at increased risk for day-to-day problems with executive functioning.  strength was below average bilaterally, which does not provide support for a lateralized issue. The patient rated his current level of pain as \"0/5 - No Pain\" on the Elise-Melzack Pain Questionnaire. The patient was also administered the [address], an assessment of day-to-day adaptive neurocognitive functioning abilities. His Total T score of 20 is within the severely impaired range. His Memory (<2nd %lie) was markedly impaired. His Time Management (<2nd %ile) was severely impaired. Money and Calculation (<2nd %ile) is severely impaired. Communication (<2nd %ile) is within the markedly impaired range. This is suggestive of day-to-day problems with all adaptive domains assessed. There is a need for day-to-day supervision in these domains. His Geriatric Depression Inventory score of 8 was within the normal range. His Garza Anxiety Inventory Score of 9 reflected mild anxiety.   The patient's responses on the [facility] Symptom Checklist were deemed invalid and this test was discontinued. Marked problems with general adaptive skills (0.5th %ile), conceptual skills (0.3rd %ile), social skills (1st %ile), and practical skills (1st %ile) were reported by the spouse on the ABAS-3. On the CASE, the spouse reported marked concerns regarding the patient's cognitive functioning as well as concern for rosa, psychosis, and some anxiety/depression. Impressions and Recommendations: This is the patient's third Neuropsychological Evaluation. At first, he was showing mild to moderate dementia. On second exam, dementia had progressed to the moderate range. On the current exam, the results indicate that dementia has progressed to the severe range. This is masked to some degree by generally intact casual language abilities. Despite treatment efforts, dementia is progressing, and this appears to be a rather steady decline here. Prognosis is grim. He continues to hallucinate and he himself reports mild anxiety. He is on various psychiatric medications and is also on Aricept and Namenda. Unfortunately, and despite best efforts, this is an ongoing progressive issue. At this point in time, his comfort and safety are important, and it is especially important to ensure that the spouse has any assistance and support she needs, as this is disease which greatly impacts family, and caregiver stress needs to be addressed. In that regard, it may be wise to consider transfer to assisted living or to engage with in-home health on a routine and daily basis. I do not find him capable of making informed decisions, and guardianship should be assigned if this has not been done so already. He is not driving. Most, if not all, ADLs need to be supervised. He will likely to not be testable at all in the future, but I am happy to provide any support and assistance within my purview to the patient and to the family on a prn basis. The patient should be encouraged to remain as mentally, socially, and physically active as possible. Clinical correlation is, of course, indicated. I will discuss these findings with the patient and family when they follow up with me in the near future. A follow up Neuropsychological Evaluation is indicated on a prn basis. The above information is based upon information currently available to me. If there is any additional information of which I am currently unaware, I would be more than happy to review it upon having it made available to me. Thank you for the opportunity to see this interesting individual.     Sincerely,       Kieran Ewing. Erik Hoyt Ps, EdS      Cc: Oswaldo Pulido NP    Time Documentation:    43125*1 70540*8     64442 x 1  41352 x 7 Test Administration/Data Gathering By Technician: (3 hours). 55764 x 1 (first 30 minutes), 93501 x 7 (each additional 30 minutes)    96132 x 1  96133 x 1 Testing Evaluation Services by Neuropsychologist (1 hour, 50 minutes) 96132 x 1 (first hour), 96133 x 1 (50 minutes)    Definitions:      43853/03206:  Neurobehavioral Status Exam, Clinical interview. Clinical assessment of thinking, reasoning and judgment, by neuropsychologist, both face to face time with patient and time interpreting those test results and reporting, first and subsequent hours)    81061/40868: Neuropsychological Test Administration by Technician/Psychometrist, first 30 minutes and each additional 30 minutes. The above includes: Record review. Review of history provided by patient. Review of collaborative information. Testing by Clinician. Review of raw data. Scoring. Report writing of individual tests administered by Clinician.   Integration of individual tests administered by psychometrist with NSE/testing by clinician, review of records/history/collaborative information, case Conceptualization, treatment planning, clinical decision making, report writing, coordination Of Care. Psychometry test codes as time spent by psychometrist administering and scoring neurocognitive/psychological tests under supervision of neuropsychologist.  Integral services including scoring of raw data, data interpretation, case conceptualization, report writing etcetera were initiated after the patient finished testing/raw data collected and was completed on the date the report was signed.

## 2019-10-28 DIAGNOSIS — E78.00 HYPERCHOLESTEREMIA: ICD-10-CM

## 2019-10-28 RX ORDER — NIACIN 500 MG/1
TABLET, FILM COATED, EXTENDED RELEASE ORAL
Qty: 90 TAB | Refills: 4 | Status: SHIPPED | OUTPATIENT
Start: 2019-10-28 | End: 2021-01-20

## 2019-11-25 ENCOUNTER — OFFICE VISIT (OUTPATIENT)
Dept: NEUROLOGY | Age: 69
End: 2019-11-25

## 2019-11-25 ENCOUNTER — TELEPHONE (OUTPATIENT)
Dept: NEUROLOGY | Age: 69
End: 2019-11-25

## 2019-11-25 VITALS
DIASTOLIC BLOOD PRESSURE: 74 MMHG | HEART RATE: 64 BPM | RESPIRATION RATE: 20 BRPM | BODY MASS INDEX: 24.8 KG/M2 | HEIGHT: 73 IN | SYSTOLIC BLOOD PRESSURE: 126 MMHG | OXYGEN SATURATION: 97 %

## 2019-11-25 DIAGNOSIS — F90.0 ATTENTION DEFICIT HYPERACTIVITY DISORDER (ADHD), PREDOMINANTLY INATTENTIVE TYPE: ICD-10-CM

## 2019-11-25 RX ORDER — DEXTROAMPHETAMINE SACCHARATE, AMPHETAMINE ASPARTATE, DEXTROAMPHETAMINE SULFATE AND AMPHETAMINE SULFATE 7.5; 7.5; 7.5; 7.5 MG/1; MG/1; MG/1; MG/1
60 TABLET ORAL DAILY
Qty: 180 TAB | Refills: 0 | Status: SHIPPED | OUTPATIENT
Start: 2019-11-25 | End: 2020-04-28

## 2019-11-25 NOTE — PROGRESS NOTES
Siomara Deng is a 71 y.o. male who presents with the following  Chief Complaint   Patient presents with    Results     Dr. Mariela Valdovinos testing        HPI             Here for a follow up for neurospych results. Things continue to decline. Family has seen decline. He is sleeping more. Speech is harder to understand. His overall memory is worse. Living with wife and she is main caregiver. Has needed help getting dressed recently, etc.   She does not want him to have to go anywhere. Interested in social work and day programs. He continues to hallucinate now. Talk to himself. [place]. Cut pictures of women out of magazines. Not notice anyone else is around. Not sure If he sleeps very well. States there are two women trying to get him out of his house.         As a full recap, patient comes in with wife for a follow up for Dementia, anxiety.      Currently on Aricept 20 mg and namenda 10 mg BID. Feels his long term memory has been the same but his short term is getting worse still and worse.      She has to tell him things multiple times, will forget to take out trash will actually walk over it to do something else. Short term giving an example of he will get something out of the kitchen and will put it back in the wrong place. Is better with the increase to 40 mg Adderall in the morning the wife has noticed. Want to increase again. Wife is helping remember medications.      Lexapro 20 mg and she has noticed  Less agitation, stress, irritation day to day has decreased and his mood is a lot better then before. He is calmer and his memory is actually showing to be better.      Wife will have to repeat herself, ask same questions. She does help him remember the medications day to day when he forgets even though they are on his bedside table. Overall feels like the changes have been positive.  Still not sleeping in the bed. want to get a new bed soon.                    No Known Allergies    Current Outpatient Medications   Medication Sig    dextroamphetamine-amphetamine (ADDERALL) 30 mg tablet Take 2 Tabs by mouth daily. Max Daily Amount: 2 Tabs.  NIASPAN 500 mg tablet TAKE 1 TABLET NIGHTLY    rosuvastatin (CRESTOR) 20 mg tablet TAKE 1 TABLET DAILY    risperiDONE (RISPERDAL) 0.5 mg tablet TAKE 1 TABLET BY MOUTH TWICE DAILY    furosemide (LASIX) 20 mg tablet TAKE 1 TABLET BY MOUTH DAILY    escitalopram oxalate (LEXAPRO) 20 mg tablet TAKE 1 TABLET BY MOUTH DAILY    donepezil (ARICEPT) 10 mg tablet TAKE 2 TABLETS BY MOUTH EVERY NIGHT    memantine (NAMENDA) 10 mg tablet Take 1 Tab by mouth two (2) times a day.  clindamycin (CLEOCIN) 300 mg capsule TK ONE C PO  Q 6 H    metoprolol succinate (TOPROL XL) 50 mg XL tablet Take 1 Tab by mouth daily.  DOCOSAHEXANOIC ACID/EPA (FISH OIL PO) Take 1,200 mg by mouth daily.  PV W-O LAZARUS/FERROUS FUMARATE/FA (M-VIT PO) Take  by mouth.  POTASSIUM PO Take 550 mg by mouth.  OMEGA-3 FATTY ACIDS (OMEGA 3 PO) Take  by mouth.  aspirin (ASPIRIN) 325 mg tablet Take 325 mg by mouth daily. No current facility-administered medications for this visit.         Social History     Tobacco Use   Smoking Status Current Every Day Smoker    Packs/day: 1.00   Smokeless Tobacco Never Used       Past Medical History:   Diagnosis Date    [facility]ism [facility] [address]     CAD (coronary artery disease) 6/15/2010    DM (diabetes mellitus) ([facility] [address] 6/15/2010    Falls     Heart disease     HTN (hypertension) 6/15/2010    Hypercholesteremia 6/15/2010    PTSD (post-traumatic stress disorder)     Skin cancer 6/15/2010    Smoker 6/15/2010    Snoring        Past Surgical History:   Procedure Laterality Date    CARDIAC SURG PROCEDURE UNLIST      stent       Family History   Problem Relation Age of Onset    Cancer Mother     Heart Disease Other        Social History     Socioeconomic History    Marital status:      Spouse name: Not on file    Number of children: Not on file  Years of education: Not on file    Highest education level: Not on file   Tobacco Use    Smoking status: Current Every Day Smoker     Packs/day: 1.00    Smokeless tobacco: Never Used   Substance and Sexual Activity    Alcohol use: Yes     Alcohol/week: 4.0 standard drinks     Types: 4 Cans of beer per week    Drug use: No    Sexual activity: Never       Review of Systems   Eyes: Negative for blurred vision, double vision and photophobia. Gastrointestinal: Negative for nausea and vomiting. Neurological: Negative for dizziness, tingling and headaches. Psychiatric/Behavioral: Positive for memory loss. Negative for depression, hallucinations, substance abuse and suicidal ideas. The patient is nervous/anxious. The patient does not have insomnia. Remainder of comprehensive review is negative. Physical Exam :    Visit Vitals  /74   Pulse 64   Resp 20   Ht 6' 1\" (1.854 m)   SpO2 97%   BMI 24.80 kg/m²         Results for orders placed or performed in visit on 09/12/19   URINE CULTURE,COMPREHENSIVE   Result Value Ref Range    Urine Culture,Comprehensive Mixed urogenital alvin  1,000 Colonies/mL      METABOLIC PANEL, COMPREHENSIVE   Result Value Ref Range    Glucose 100 (H) 65 - 99 mg/dL    BUN 19 8 - 27 mg/dL    Creatinine 1.19 0.76 - 1.27 mg/dL    GFR est non-AA 62 >59 mL/min/1.73    GFR est AA 72 >59 mL/min/1.73    BUN/Creatinine ratio 16 10 - 24    Sodium 145 (H) 134 - 144 mmol/L    Potassium 4.6 3.5 - 5.2 mmol/L    Chloride 103 96 - 106 mmol/L    CO2 28 20 - 29 mmol/L    Calcium 9.6 8.6 - 10.2 mg/dL    Protein, total 6.7 6.0 - 8.5 g/dL    Albumin 4.7 3.6 - 4.8 g/dL    GLOBULIN, TOTAL 2.0 1.5 - 4.5 g/dL    A-G Ratio 2.4 (H) 1.2 - 2.2    Bilirubin, total <0.2 0.0 - 1.2 mg/dL    Alk.  phosphatase 78 39 - 117 IU/L    AST (SGOT) 31 0 - 40 IU/L    ALT (SGPT) 23 0 - 44 IU/L   CBC WITH AUTOMATED DIFF   Result Value Ref Range    WBC 6.8 3.4 - 10.8 x10E3/uL    RBC 4.60 4.14 - 5.80 x10E6/uL    HGB 14.8 13.0 - 17.7 g/dL    HCT 44.3 37.5 - 51.0 %    MCV 96 79 - 97 fL    MCH 32.2 26.6 - 33.0 pg    MCHC 33.4 31.5 - 35.7 g/dL    RDW 12.3 12.3 - 15.4 %    PLATELET 498 256 - 353 x10E3/uL    NEUTROPHILS 43 Not Estab. %    Lymphocytes 44 Not Estab. %    MONOCYTES 8 Not Estab. %    EOSINOPHILS 4 Not Estab. %    BASOPHILS 1 Not Estab. %    ABS. NEUTROPHILS 2.9 1.4 - 7.0 x10E3/uL    Abs Lymphocytes 3.0 0.7 - 3.1 x10E3/uL    ABS. MONOCYTES 0.5 0.1 - 0.9 x10E3/uL    ABS. EOSINOPHILS 0.3 0.0 - 0.4 x10E3/uL    ABS. BASOPHILS 0.1 0.0 - 0.2 x10E3/uL    IMMATURE GRANULOCYTES 0 Not Estab. %    ABS. IMM. GRANS. 0.0 0.0 - 0.1 x10E3/uL              Impressions and Recommendations: This is the patient's third Neuropsychological Evaluation. At first, he was showing mild to moderate dementia. On second exam, dementia had progressed to the moderate range. On the current exam, the results indicate that dementia has progressed to the severe range. This is masked to some degree by generally intact casual language abilities. Despite treatment efforts, dementia is progressing, and this appears to be a rather steady decline here. Prognosis is grim. He continues to hallucinate and he himself reports mild anxiety. He is on various psychiatric medications and is also on Aricept and Namenda.                   Unfortunately, and despite best efforts, this is an ongoing progressive issue. At this point in time, his comfort and safety are important, and it is especially important to ensure that the spouse has any assistance and support she needs, as this is disease which greatly impacts family, and caregiver stress needs to be addressed. In that regard, it may be wise to consider transfer to assisted living or to engage with in-home health on a routine and daily basis. I do not find him capable of making informed decisions, and guardianship should be assigned if this has not been done so already. He is not driving.   Most, if not all, ADLs need to be supervised. He will likely to not be testable at all in the future, but I am happy to provide any support and assistance within my purview to the patient and to the family on a prn basis. The patient should be encouraged to remain as mentally, socially, and physically active as possible. Clinical correlation is, of course, indicated.                 I will discuss these findings with the patient and family when they follow up with me in the near future. A follow up Neuropsychological Evaluation is indicated on a prn basis.         Orders Placed This Encounter    dextroamphetamine-amphetamine (ADDERALL) 30 mg tablet     Sig: Take 2 Tabs by mouth daily. Max Daily Amount: 2 Tabs. Dispense:  180 Tab     Refill:  0       1. Attention deficit hyperactivity disorder (ADHD), predominantly inattentive type    2. Severe Dementia. neuropsych seen above and discussed in full. no questions. continue to keep the brain active and engaged. Keep Aricept, Namenda, Adderall and Lexapro at current dosing. Increase physical activity as tolerated. Refer to social work to help with other care aspects. Look into day programs to help give caregiver breaks   No safety concerns at the moment.        This note will not be viewable in [employer]

## 2019-11-25 NOTE — TELEPHONE ENCOUNTER
Patient's wife said she was told the paperwork for a handicap sticker would get filled out. She said she can pick it up tomorrow if it can be ready.

## 2019-11-26 ENCOUNTER — TELEPHONE (OUTPATIENT)
Dept: NEUROLOGY | Age: 69
End: 2019-11-26

## 2019-11-26 NOTE — TELEPHONE ENCOUNTER
----- Message from Jose Moore sent at 11/26/2019  1:50 PM EST -----  Regarding: RAYMOND Arellano/telephone  Contact: [phone]  General Message/Vendor Calls    Caller's first and last name:  Alicia Pollack pt's wife      Reason for call:to see if the  [facility] paper work for her  handicap tag is ready for her to        Callback required yes/no and why:yes, for reason given above      Best contact number(s):[phone]      Details to clarify the request:her  was in yesterday and RAYMOND Arellano forgot to complete the forms and she said she would call today to see if it was completed before coming in    Jose Moore

## 2019-12-01 RX ORDER — FUROSEMIDE 20 MG/1
TABLET ORAL
Qty: 90 TAB | Refills: 0 | Status: SHIPPED | OUTPATIENT
Start: 2019-12-01 | End: 2019-12-30

## 2019-12-08 RX ORDER — METOPROLOL SUCCINATE 50 MG/1
TABLET, EXTENDED RELEASE ORAL
Qty: 90 TAB | Refills: 4 | Status: SHIPPED | OUTPATIENT
Start: 2019-12-08 | End: 2021-03-05

## 2019-12-23 DIAGNOSIS — F03.90 DEMENTIA WITHOUT BEHAVIORAL DISTURBANCE, UNSPECIFIED DEMENTIA TYPE: ICD-10-CM

## 2019-12-23 RX ORDER — MEMANTINE HYDROCHLORIDE 10 MG/1
TABLET ORAL
Qty: 180 TAB | Refills: 4 | Status: SHIPPED | OUTPATIENT
Start: 2019-12-23 | End: 2021-03-17

## 2019-12-30 ENCOUNTER — HOSPITAL ENCOUNTER (OUTPATIENT)
Dept: LAB | Age: 69
Discharge: HOME | End: 2019-12-30
Payer: MEDICARE

## 2019-12-30 ENCOUNTER — HOSPITAL ENCOUNTER (OUTPATIENT)
Dept: GENERAL RADIOLOGY | Age: 69
Discharge: HOME | End: 2019-12-30
Payer: MEDICARE

## 2019-12-30 ENCOUNTER — OFFICE VISIT (OUTPATIENT)
Dept: FAMILY MEDICINE CLINIC | Age: 69
End: 2019-12-30

## 2019-12-30 VITALS
HEIGHT: 73 IN | BODY MASS INDEX: 24.65 KG/M2 | WEIGHT: 186 LBS | DIASTOLIC BLOOD PRESSURE: 63 MMHG | RESPIRATION RATE: 18 BRPM | OXYGEN SATURATION: 99 % | HEART RATE: 60 BPM | SYSTOLIC BLOOD PRESSURE: 95 MMHG | TEMPERATURE: 98.3 F

## 2019-12-30 DIAGNOSIS — E11.9 TYPE 2 DIABETES MELLITUS WITHOUT COMPLICATION, WITHOUT LONG-TERM CURRENT USE OF INSULIN (HCC): ICD-10-CM

## 2019-12-30 DIAGNOSIS — E78.00 HYPERCHOLESTEREMIA: ICD-10-CM

## 2019-12-30 DIAGNOSIS — R60.0 BILATERAL LOWER EXTREMITY EDEMA: ICD-10-CM

## 2019-12-30 DIAGNOSIS — R93.89 ABNORMAL CHEST X-RAY: Primary | ICD-10-CM

## 2019-12-30 DIAGNOSIS — I10 ESSENTIAL HYPERTENSION: ICD-10-CM

## 2019-12-30 DIAGNOSIS — R60.0 BILATERAL LOWER EXTREMITY EDEMA: Primary | ICD-10-CM

## 2019-12-30 LAB
ALBUMIN SERPL-MCNC: 4 G/DL (ref 3.5–5)
ALBUMIN/GLOB SERPL: 1.2 {RATIO} (ref 1.1–2.2)
ALP SERPL-CCNC: 92 U/L (ref 45–117)
ALT SERPL-CCNC: 36 U/L (ref 12–78)
ANION GAP SERPL CALC-SCNC: 5 MMOL/L (ref 5–15)
AST SERPL-CCNC: 31 U/L (ref 15–37)
BASOPHILS # BLD: 0.1 K/UL (ref 0–0.1)
BASOPHILS NFR BLD: 1 % (ref 0–1)
BILIRUB SERPL-MCNC: 0.4 MG/DL (ref 0.2–1)
BNP SERPL-MCNC: 78 PG/ML
BUN SERPL-MCNC: 19 MG/DL (ref 6–20)
BUN/CREAT SERPL: 16 (ref 12–20)
CALCIUM SERPL-MCNC: 9.7 MG/DL (ref 8.5–10.1)
CHLORIDE SERPL-SCNC: 104 MMOL/L (ref 97–108)
CHOLEST SERPL-MCNC: 158 MG/DL
CO2 SERPL-SCNC: 30 MMOL/L (ref 21–32)
CREAT SERPL-MCNC: 1.17 MG/DL (ref 0.7–1.3)
DIFFERENTIAL METHOD BLD: ABNORMAL
EOSINOPHIL # BLD: 0.2 K/UL (ref 0–0.4)
EOSINOPHIL NFR BLD: 3 % (ref 0–7)
ERYTHROCYTE [DISTWIDTH] IN BLOOD BY AUTOMATED COUNT: 13.4 % (ref 11.5–14.5)
EST. AVERAGE GLUCOSE BLD GHB EST-MCNC: 131 MG/DL
GLOBULIN SER CALC-MCNC: 3.4 G/DL (ref 2–4)
GLUCOSE SERPL-MCNC: 133 MG/DL (ref 65–100)
HBA1C MFR BLD: 6.2 % (ref 4–5.6)
HCT VFR BLD AUTO: 47.9 % (ref 36.6–50.3)
HDLC SERPL-MCNC: 78 MG/DL
HDLC SERPL: 2 {RATIO} (ref 0–5)
HGB BLD-MCNC: 15 G/DL (ref 12.1–17)
IMM GRANULOCYTES # BLD AUTO: 0 K/UL (ref 0–0.04)
IMM GRANULOCYTES NFR BLD AUTO: 0 % (ref 0–0.5)
LDLC SERPL CALC-MCNC: 42.2 MG/DL (ref 0–100)
LIPID PROFILE,FLP: ABNORMAL
LYMPHOCYTES # BLD: 2.7 K/UL (ref 0.8–3.5)
LYMPHOCYTES NFR BLD: 36 % (ref 12–49)
MCH RBC QN AUTO: 32.1 PG (ref 26–34)
MCHC RBC AUTO-ENTMCNC: 31.3 G/DL (ref 30–36.5)
MCV RBC AUTO: 102.4 FL (ref 80–99)
MONOCYTES # BLD: 0.6 K/UL (ref 0–1)
MONOCYTES NFR BLD: 7 % (ref 5–13)
NEUTS SEG # BLD: 3.9 K/UL (ref 1.8–8)
NEUTS SEG NFR BLD: 53 % (ref 32–75)
NRBC # BLD: 0 K/UL (ref 0–0.01)
NRBC BLD-RTO: 0 PER 100 WBC
PLATELET # BLD AUTO: 139 K/UL (ref 150–400)
PMV BLD AUTO: 12.3 FL (ref 8.9–12.9)
POTASSIUM SERPL-SCNC: 3.9 MMOL/L (ref 3.5–5.1)
PROT SERPL-MCNC: 7.4 G/DL (ref 6.4–8.2)
RBC # BLD AUTO: 4.68 M/UL (ref 4.1–5.7)
SODIUM SERPL-SCNC: 139 MMOL/L (ref 136–145)
TRIGL SERPL-MCNC: 189 MG/DL (ref ?–150)
VLDLC SERPL CALC-MCNC: 37.8 MG/DL
WBC # BLD AUTO: 7.5 K/UL (ref 4.1–11.1)

## 2019-12-30 PROCEDURE — 71046 X-RAY EXAM CHEST 2 VIEWS: CPT

## 2019-12-30 RX ORDER — BUMETANIDE 1 MG/1
1 TABLET ORAL 2 TIMES DAILY
Qty: 60 TAB | Refills: 2 | Status: SHIPPED | OUTPATIENT
Start: 2019-12-30 | End: 2020-04-28 | Stop reason: ALTCHOICE

## 2019-12-30 NOTE — PROGRESS NOTES
Chief Complaint   Patient presents with    Foot Swelling     Patient presents during walk in clinic with feet swelling that was noted 4 months ago. Wife states left foot has cold sensation with swelling. Pt was taken to ED x2, exam was normal.  Advised to apply compression stockings and elevate. Pt has not est care with vascular specialist or lymphedema clinic. Pt last saw cardiologist 16 yrs ago, pt has hx of MI,4 stents have been placed as a result. Last seen in ED for this was 4 months ago. Denies any CP, sob, and dyspnea. Denies swelling being worse at the end of the day. Pretty constant. Denies any pain. Denies any tingling or numbness. Mild TREADWELL, like going up a flight a stairs. Neg PND. Sleeps in a recliner. Usually just falls asleep there. Denies any history of DVT. Taking crestor and niaspan daily for his lipids. Currently taking 20 mg lasix once daily in the morning. Cardiologist was  Dr. Clara Barba, would like to see someone else. Denies any other concerns at this time. Chief Complaint   Patient presents with    Foot Swelling     he is a 71y.o. year old male who presents for evalution. Reviewed PmHx, RxHx, FmHx, SocHx, AllgHx and updated and dated in the chart.     Review of Systems - negative except as listed above in the HPI    Objective:     Vitals:    12/30/19 0819   BP: 95/63   Pulse: 60   Resp: 18   Temp: 98.3 °F (36.8 °C)   TempSrc: Oral   SpO2: 99%   Weight: 186 lb (84.4 kg)   Height: 6' 1\" (1.854 m)     Physical Examination: General appearance - alert, well appearing, and in no distress  Mental status - normal mood, behavior  Eyes - pupils equal and reactive, extraocular eye movements intact  Ears - bilateral TM's and external ear canals normal  Nose - normal and patent, no erythema, discharge or polyps and normal nontender sinuses  Mouth - mucous membranes moist, pharynx normal without lesions  Neck - supple, no significant adenopathy  Chest - no tachypnea, retractions or cyanosis, fine crackles heard in bilateral lower lobes, lungs are otherwise CTA bilaterally with no other adventitious lung sounds  Heart - normal rate, regular rhythm, normal S1, S2  Extremities - peripheral pulses normal, 2+ pitting ankle and pedal edema bilaterally, no cyanosis, feet with dependent rubor, cap refill sluggish in the toes, Janey's sign negative bilaterally    Assessment/ Plan:   Diagnoses and all orders for this visit:    1. Bilateral lower extremity edema  -     METABOLIC PANEL, COMPREHENSIVE; Future  -     CBC WITH AUTOMATED DIFF; Future  -     NT-PRO BNP; Future  -     XR CHEST PA LAT; Future  -     REFERRAL TO CARDIOLOGY  -     bumetanide (BUMEX) 1 mg tablet; Take 1 Tab by mouth two (2) times a day. Will notify results and deviate plan based on findings. Stop lasix and start bumex BID as directed. Reviewed other supportive measures like elevation and limiting salt. Enc to re-est care with cardiology for further evaluation. Reviewed acute/worsening s/sx that warrant more immediate medical attention and pt/wife verbalized understanding of this. 2. Hypercholesteremia  -     LIPID PANEL; Future  -     REFERRAL TO CARDIOLOGY  Will notify results and deviate plan based on findings. Continue current med regimen. 3. Essential hypertension  -     REFERRAL TO CARDIOLOGY  BP a little low today. Only on low dose metoprolol. Will continue to monitor closely. 4. Type 2 diabetes mellitus without complication, without long-term current use of insulin (HCC)  -     HEMOGLOBIN A1C WITH EAG; Future  Will notify results and deviate plan based on findings. Last a1c check was February. Not currently on any meds. Follow-up and Dispositions    · Return if symptoms worsen or fail to improve. I have discussed the diagnosis with the patient and the intended plan as seen in the above orders.   The patient has received an after-visit summary and questions were answered concerning future plans. Medication Side Effects and Warnings were discussed with patient: yes  Patient Labs were reviewed and or requested: yes  Patient Past Records were reviewed and or requested  yes  Patient / Caregiver Understanding of treatment plan was verbalized during office visit YES    PERLA Silva    Patient Instructions          Leg and Ankle Edema: Care Instructions  Your Care Instructions  Swelling in the legs, ankles, and feet is called edema. It is common after you sit or stand for a while. Long plane flights or car rides often cause swelling in the legs and feet. You may also have swelling if you have to stand for long periods of time at your job. Problems with the veins in the legs (varicose veins) and changes in hormones can also cause swelling. Sometimes the swelling in the ankles and feet is caused by a more serious problem, such as heart failure, infection, blood clots, or liver or kidney disease. Follow-up care is a key part of your treatment and safety. Be sure to make and go to all appointments, and call your doctor if you are having problems. It's also a good idea to know your test results and keep a list of the medicines you take. How can you care for yourself at home? · If your doctor gave you medicine, take it as prescribed. Call your doctor if you think you are having a problem with your medicine. · Whenever you are resting, raise your legs up. Try to keep the swollen area higher than the level of your heart. · Take breaks from standing or sitting in one position. ? Walk around to increase the blood flow in your lower legs. ? Move your feet and ankles often while you stand, or tighten and relax your leg muscles. · Wear support stockings. Put them on in the morning, before swelling gets worse. · Eat a balanced diet. Lose weight if you need to. · Limit the amount of salt (sodium) in your diet. Salt holds fluid in the body and may increase swelling.   When should you call for help?  Call 911 anytime you think you may need emergency care. For example, call if:    · You have symptoms of a blood clot in your lung (called a pulmonary embolism). These may include:  ? Sudden chest pain. ? Trouble breathing. ? Coughing up blood.    Call your doctor now or seek immediate medical care if:    · You have signs of a blood clot, such as:  ? Pain in your calf, back of the knee, thigh, or groin. ? Redness and swelling in your leg or groin.     · You have symptoms of infection, such as:  ? Increased pain, swelling, warmth, or redness. ? Red streaks or pus. ? A fever.    Watch closely for changes in your health, and be sure to contact your doctor if:    · Your swelling is getting worse.     · You have new or worsening pain in your legs.     · You do not get better as expected. Where can you learn more? Go to http://olu-vick.info/. Enter V788 in the search box to learn more about \"Leg and Ankle Edema: Care Instructions. \"  Current as of: June 26, 2019  Content Version: 12.2  © 9546-4191 [employer]. Care instructions adapted under license by [employer] (which disclaims liability or warranty for this information). If you have questions about a medical condition or this instruction, always ask your healthcare professional. [place] [address] any warranty or liability for your use of this information.

## 2019-12-30 NOTE — PROGRESS NOTES
Please notify pt/caregiver that chest xray abnormal. Shows \"ill denied reticular opacities mid and lower lungs bilaterally which are nonspecific. \" will need a CT to get a better picture. This has been ordered. Should receive a call from central scheduling to schedule that test. Will deviate plan based on findings.

## 2019-12-30 NOTE — PROGRESS NOTES
Chief Complaint   Patient presents with    Foot Swelling     Patient presents during walk in clinic with feet swelling that was noted 4 months ago. Wife states left foot has cold sensation with swelling. Pt was taken to ED x2,exam was normal.  Advised to apply compression stockings and elevate. Pt has not est care with vascular specialist or lymphedema clinic. Pt last saw cardiologist 16yrs ago ,pt has hx of MI,4 stents have been placed as a result. 1. Have you been to the ER, urgent care clinic since your last visit? Hospitalized since your last visit? No    2. Have you seen or consulted any other health care providers outside of the [address] [place] [address] since your last visit? Include any pap smears or colon screening.  No

## 2019-12-30 NOTE — PATIENT INSTRUCTIONS

## 2020-01-27 ENCOUNTER — OFFICE VISIT (OUTPATIENT)
Dept: NEUROLOGY | Age: 70
End: 2020-01-27

## 2020-01-27 VITALS
OXYGEN SATURATION: 98 % | DIASTOLIC BLOOD PRESSURE: 60 MMHG | WEIGHT: 198 LBS | HEIGHT: 73 IN | HEART RATE: 69 BPM | BODY MASS INDEX: 26.24 KG/M2 | SYSTOLIC BLOOD PRESSURE: 108 MMHG

## 2020-01-27 DIAGNOSIS — F03.90 DEMENTIA WITHOUT BEHAVIORAL DISTURBANCE, UNSPECIFIED DEMENTIA TYPE: Primary | ICD-10-CM

## 2020-01-27 RX ORDER — AA/PROT/LYSINE/METHIO/VIT C/B6 50-12.5 MG
TABLET ORAL
COMMUNITY
End: 2020-07-28

## 2020-01-27 NOTE — PROGRESS NOTES
Shari Knox is a 71 y.o. male who presents with the following  Chief Complaint   Patient presents with    Alzheimers    Follow-up       HPI       Family has seen decline. He is sleeping more. Speech is harder to understand. His overall memory is worse. Living with wife and she is main caregiver. Has needed help getting dressed recently, etc.   She does not want him to have to go anywhere. Moving in with their daughter and grand daughter. Has started to use the bathroom on the back deck. Working on fixing this       As a full recap, patient comes in with wife for a follow up for Dementia, anxiety.      Currently on Aricept 20 mg and namenda 10 mg BID. Feels his long term memory has been the same but his short term is getting worse still and worse.      She has to tell him things multiple times, will forget to take out trash will actually walk over it to do something else. Short term giving an example of he will get something out of the kitchen and will put it back in the wrong place. Is better with the increase to 40 mg Adderall in the morning the wife has noticed. Want to increase again. Wife is helping remember medications.      Lexapro 20 mg and she has noticed  Less agitation, stress, irritation day to day has decreased and his mood is a lot better then before. He is calmer and his memory is actually showing to be better.      Wife will have to repeat herself, ask same questions. She does help him remember the medications day to day when he forgets even though they are on his bedside table. Overall feels like the changes have been positive. Still not sleeping in the bed. want to get a new bed soon.                         No Known Allergies    Current Outpatient Medications   Medication Sig    coenzyme q10 (CO Q-10) 10 mg cap Take  by mouth.  bumetanide (BUMEX) 1 mg tablet Take 1 Tab by mouth two (2) times a day.     memantine (NAMENDA) 10 mg tablet TAKE 1 TABLET TWICE A DAY    TOPROL XL 50 mg XL tablet TAKE 1 TABLET DAILY    dextroamphetamine-amphetamine (ADDERALL) 30 mg tablet Take 2 Tabs by mouth daily. Max Daily Amount: 2 Tabs.  NIASPAN 500 mg tablet TAKE 1 TABLET NIGHTLY    rosuvastatin (CRESTOR) 20 mg tablet TAKE 1 TABLET DAILY    risperiDONE (RISPERDAL) 0.5 mg tablet TAKE 1 TABLET BY MOUTH TWICE DAILY    escitalopram oxalate (LEXAPRO) 20 mg tablet TAKE 1 TABLET BY MOUTH DAILY    donepezil (ARICEPT) 10 mg tablet TAKE 2 TABLETS BY MOUTH EVERY NIGHT    DOCOSAHEXANOIC ACID/EPA (FISH OIL PO) Take 1,200 mg by mouth daily.  PV W-O LAZARUS/FERROUS FUMARATE/FA (M-VIT PO) Take  by mouth.  aspirin (ASPIRIN) 325 mg tablet Take 325 mg by mouth daily. No current facility-administered medications for this visit. Social History     Tobacco Use   Smoking Status Current Every Day Smoker    Packs/day: 1.00   Smokeless Tobacco Never Used       Past Medical History:   Diagnosis Date    [facility]     CAD (coronary artery disease) 6/15/2010    DM (diabetes mellitus) ([facility] 6/15/2010    Falls     Heart disease     HTN (hypertension) 6/15/2010    Hypercholesteremia 6/15/2010    PTSD (post-traumatic stress disorder)     Skin cancer 6/15/2010    Smoker 6/15/2010    Snoring        Past Surgical History:   Procedure Laterality Date    CARDIAC SURG PROCEDURE UNLIST      stent       Family History   Problem Relation Age of Onset    Cancer Mother     Heart Disease Other        Social History     Socioeconomic History    Marital status:      Spouse name: Not on file    Number of children: Not on file    Years of education: Not on file    Highest education level: Not on file   Tobacco Use    Smoking status: Current Every Day Smoker     Packs/day: 1.00    Smokeless tobacco: Never Used   Substance and Sexual Activity    Alcohol use:  Yes     Alcohol/week: 4.0 standard drinks     Types: 4 Cans of beer per week    Drug use: No    Sexual activity: Never       Review of Systems   Eyes: Negative for blurred vision, double vision and photophobia. Respiratory: Negative for shortness of breath and wheezing. Gastrointestinal: Negative for nausea and vomiting. Neurological: Negative for dizziness, tingling, seizures, loss of consciousness and headaches. Psychiatric/Behavioral: Positive for memory loss. Remainder of comprehensive review is negative. Physical Exam :    Visit Vitals  /60   Pulse 69   Ht 6' 1\" (1.854 m)   Wt 89.8 kg (198 lb)   SpO2 98%   BMI 26.12 kg/m²       General: Well defined, nourished, and groomed individual in no acute distress.    Neck: Supple, nontender, no bruits, no pain with resistance to active range of motion.    Psych: Good mood and bright affect    NEUROLOGICAL EXAMINATION:    Mental Status: Alert and oriented to person, place, not time. mmse 20     Cranial Nerves:    II, III, IV, VI: Visual acuity grossly intact. Visual fields are normal.    Pupils are equal, round, and reactive to light and accommodation.    Extra-ocular movements are full and fluid. Fundoscopic exam was benign, no ptosis or nystagmus.    V-XII: Hearing is grossly intact. Facial features are symmetric, with normal sensation and strength. The palate rises symmetrically and the tongue protrudes midline. Sternocleidomastoids 5/5. Motor Examination: Normal tone, bulk, and strength, 5/5 muscle strength throughout.              Results for orders placed or performed during the hospital encounter of 67/36/78   METABOLIC PANEL, COMPREHENSIVE   Result Value Ref Range    Sodium 139 136 - 145 mmol/L    Potassium 3.9 3.5 - 5.1 mmol/L    Chloride 104 97 - 108 mmol/L    CO2 30 21 - 32 mmol/L    Anion gap 5 5 - 15 mmol/L    Glucose 133 (H) 65 - 100 mg/dL    BUN 19 6 - 20 MG/DL    Creatinine 1.17 0.70 - 1.30 MG/DL    BUN/Creatinine ratio 16 12 - 20      GFR est AA >60 >60 ml/min/1.73m2    GFR est non-AA >60 >60 ml/min/1.73m2    Calcium 9.7 8.5 - 10.1 MG/DL    Bilirubin, total 0.4 0.2 - 1.0 MG/DL    ALT (SGPT) 36 12 - 78 U/L    AST (SGOT) 31 15 - 37 U/L    Alk. phosphatase 92 45 - 117 U/L    Protein, total 7.4 6.4 - 8.2 g/dL    Albumin 4.0 3.5 - 5.0 g/dL    Globulin 3.4 2.0 - 4.0 g/dL    A-G Ratio 1.2 1.1 - 2.2     CBC WITH AUTOMATED DIFF   Result Value Ref Range    WBC 7.5 4.1 - 11.1 K/uL    RBC 4.68 4.10 - 5.70 M/uL    HGB 15.0 12.1 - 17.0 g/dL    HCT 47.9 36.6 - 50.3 %    .4 (H) 80.0 - 99.0 FL    MCH 32.1 26.0 - 34.0 PG    MCHC 31.3 30.0 - 36.5 g/dL    RDW 13.4 11.5 - 14.5 %    PLATELET 742 (L) 666 - 400 K/uL    MPV 12.3 8.9 - 12.9 FL    NRBC 0.0 0  WBC    ABSOLUTE NRBC 0.00 0.00 - 0.01 K/uL    NEUTROPHILS 53 32 - 75 %    LYMPHOCYTES 36 12 - 49 %    MONOCYTES 7 5 - 13 %    EOSINOPHILS 3 0 - 7 %    BASOPHILS 1 0 - 1 %    IMMATURE GRANULOCYTES 0 0.0 - 0.5 %    ABS. NEUTROPHILS 3.9 1.8 - 8.0 K/UL    ABS. LYMPHOCYTES 2.7 0.8 - 3.5 K/UL    ABS. MONOCYTES 0.6 0.0 - 1.0 K/UL    ABS. EOSINOPHILS 0.2 0.0 - 0.4 K/UL    ABS. BASOPHILS 0.1 0.0 - 0.1 K/UL    ABS. IMM. GRANS. 0.0 0.00 - 0.04 K/UL    DF AUTOMATED     NT-PRO BNP   Result Value Ref Range    NT pro-BNP 78 <125 PG/ML   LIPID PANEL   Result Value Ref Range    LIPID PROFILE          Cholesterol, total 158 <200 MG/DL    Triglyceride 189 (H) <150 MG/DL    HDL Cholesterol 78 MG/DL    LDL, calculated 42.2 0 - 100 MG/DL    VLDL, calculated 37.8 MG/DL    CHOL/HDL Ratio 2.0 0.0 - 5.0     HEMOGLOBIN A1C WITH EAG   Result Value Ref Range    Hemoglobin A1c 6.2 (H) 4.0 - 5.6 %    Est. average glucose 131 mg/dL       Orders Placed This Encounter    coenzyme q10 (CO Q-10) 10 mg cap     Sig: Take  by mouth. No diagnosis found.                 This note will not be viewable in [employer]

## 2020-04-10 DIAGNOSIS — G30.0 EARLY ONSET ALZHEIMER'S DEMENTIA WITHOUT BEHAVIORAL DISTURBANCE (HCC): ICD-10-CM

## 2020-04-10 DIAGNOSIS — F02.80 EARLY ONSET ALZHEIMER'S DEMENTIA WITHOUT BEHAVIORAL DISTURBANCE (HCC): ICD-10-CM

## 2020-04-10 RX ORDER — DONEPEZIL HYDROCHLORIDE 10 MG/1
TABLET, FILM COATED ORAL
Qty: 180 TAB | Refills: 3 | Status: SHIPPED | OUTPATIENT
Start: 2020-04-10 | End: 2021-03-05 | Stop reason: ALTCHOICE

## 2020-04-28 ENCOUNTER — VIRTUAL VISIT (OUTPATIENT)
Dept: NEUROLOGY | Age: 70
End: 2020-04-28

## 2020-04-28 VITALS — BODY MASS INDEX: 26.12 KG/M2 | HEIGHT: 73 IN

## 2020-04-28 DIAGNOSIS — F90.0 ATTENTION DEFICIT HYPERACTIVITY DISORDER (ADHD), PREDOMINANTLY INATTENTIVE TYPE: Primary | ICD-10-CM

## 2020-04-28 DIAGNOSIS — G30.9 ALZHEIMER'S DEMENTIA WITHOUT BEHAVIORAL DISTURBANCE, UNSPECIFIED TIMING OF DEMENTIA ONSET: ICD-10-CM

## 2020-04-28 DIAGNOSIS — F02.80 ALZHEIMER'S DEMENTIA WITHOUT BEHAVIORAL DISTURBANCE, UNSPECIFIED TIMING OF DEMENTIA ONSET: ICD-10-CM

## 2020-04-28 RX ORDER — BUMETANIDE 2 MG/1
TABLET ORAL
COMMUNITY
Start: 2020-03-26 | End: 2020-07-28

## 2020-04-28 RX ORDER — POTASSIUM CHLORIDE 1500 MG/1
TABLET, FILM COATED, EXTENDED RELEASE ORAL
COMMUNITY
Start: 2020-03-26 | End: 2021-03-05 | Stop reason: ALTCHOICE

## 2020-04-28 RX ORDER — DEXTROAMPHETAMINE SACCHARATE, AMPHETAMINE ASPARTATE MONOHYDRATE, DEXTROAMPHETAMINE SULFATE AND AMPHETAMINE SULFATE 5; 5; 5; 5 MG/1; MG/1; MG/1; MG/1
40 CAPSULE, EXTENDED RELEASE ORAL
Qty: 60 CAP | Refills: 0 | Status: SHIPPED | OUTPATIENT
Start: 2020-04-28 | End: 2020-05-28 | Stop reason: SDUPTHER

## 2020-04-28 NOTE — PROGRESS NOTES
Thiago Niño is a 79 y.o. male who was seen by synchronous (real-time) audio-video technology on 4/28/2020. Consent: Thiago Lacy, who was seen by synchronous (real-time) audio-video technology, and/or his healthcare decision maker, is aware that this patient-initiated, Telehealth encounter on 4/28/2020 is a billable service, with coverage as determined by his insurance carrier. He is aware that he may receive a bill and has provided verbal consent to proceed: Yes. Patient comes in video chat with wife and daughter. Family has seen decline. He is sleeping more. Speech is harder to understand. His overall memory is worse still. He is back in the bed now though and sleeping better. Living with wife and she is main caregiver. Has needed help getting dressed recently, etc.   Moving in with their daughter and grand daughter in the next month or so.      Working on frequent toileting, etc.   Has incontinence at times. Getting a good meal a day atleast  Listening to music, etc.   Getting outside on walks with wife.         Currently on Aricept 20 mg and namenda 10 mg BID.     She has to tell him things multiple times, will forget to take out trash will actually walk over it to do something else.      Lexapro 20 mg and she has noticed Less agitation, stress, irritation day to day has decreased and his mood is a lot better then before. He is calmer and his memory is actually showing to be better.      Wife will have to repeat herself, ask same questions. She does help him remember the medications day to day      Did have a bout with dehydration a few weeks ago but this was fixed by family. Keeping him engaged. Assessment & Plan:   Diagnoses and all orders for this visit:    1. Attention deficit hyperactivity disorder (ADHD), predominantly inattentive type  -     amphetamine-dextroamphetamine XR (ADDERALL XR) 20 mg XR capsule; Take 2 Caps by mouth every morning.  Max Daily Amount: 40 mg.    2. Alzheimer's dementia without behavioral disturbance, unspecified timing of dementia onset ([facility]              Subjective:   Ladarius West is a 79 y.o. male who was seen for Alzheimers and Follow-up      Prior to Admission medications    Medication Sig Start Date End Date Taking? Authorizing Provider   potassium chloride SR (K-TAB) 20 mEq tablet TK 1 T PO QD WF 3/26/20  Yes Provider, Historical   bumetanide (BUMEX) 2 mg tablet TK 1 T PO BID 3/26/20  Yes Provider, Historical   amphetamine-dextroamphetamine XR (ADDERALL XR) 20 mg XR capsule Take 2 Caps by mouth every morning. Max Daily Amount: 40 mg. 4/28/20  Yes Harika Rodríguez NP   donepeziL (ARICEPT) 10 mg tablet TAKE 2 TABLETS EVERY NIGHT 4/10/20  Yes Harika Rodríguez NP   coenzyme q10 (CO Q-10) 10 mg cap Take  by mouth. Yes Provider, Historical   memantine (NAMENDA) 10 mg tablet TAKE 1 TABLET TWICE A DAY 12/23/19  Yes Harika Rodríguez NP   TOPROL XL 50 mg XL tablet TAKE 1 TABLET DAILY 12/8/19  Yes Jessica Randolph NP   NIASPAN 500 mg tablet TAKE 1 TABLET NIGHTLY 10/28/19  Yes Jessica Randolph NP   rosuvastatin (CRESTOR) 20 mg tablet TAKE 1 TABLET DAILY 9/16/19  Meghan Randolph NP   risperiDONE (RISPERDAL) 0.5 mg tablet TAKE 1 TABLET BY MOUTH TWICE DAILY 9/12/19  Yes Harika Rodríguez NP   escitalopram oxalate (LEXAPRO) 20 mg tablet TAKE 1 TABLET BY MOUTH DAILY 9/3/19  Yes Harika Rodríguez NP   DOCOSAHEXANOIC ACID/EPA (FISH OIL PO) Take 1,200 mg by mouth daily. Yes Provider, Historical   PV W-O LAZARUS/FERROUS FUMARATE/FA (M-VIT PO) Take  by mouth. Yes Provider, Historical   aspirin (ASPIRIN) 325 mg tablet Take 325 mg by mouth daily. Yes Provider, Historical   bumetanide (BUMEX) 1 mg tablet Take 1 Tab by mouth two (2) times a day. 12/30/19 4/28/20  Nneka Bee NP   dextroamphetamine-amphetamine (ADDERALL) 30 mg tablet Take 2 Tabs by mouth daily. Max Daily Amount: 2 Tabs.  11/25/19 4/28/20  Harika Rodríguez, NP     No Known Allergies    Patient Active Problem List   Diagnosis Code    Hypercholesteremia E78.00    Skin cancer C44.90    CAD (coronary artery disease) I25.10    HTN (hypertension) I10    DM (diabetes mellitus) ([facility] E11.9    Smoker F17.200    Advance care planning Z71.89    Advanced care planning/counseling discussion Z70.80    Alzheimer's dementia with behavioral disturbance ([facility] G30.9, F02.81     Patient Active Problem List    Diagnosis Date Noted    Alzheimer's dementia with behavioral disturbance ([facility] 08/20/2018    Advanced care planning/counseling discussion 08/18/2017    Advance care planning 03/21/2016    Hypercholesteremia 06/15/2010    Skin cancer 06/15/2010    CAD (coronary artery disease) 06/15/2010    HTN (hypertension) 06/15/2010    DM (diabetes mellitus) ([facility] 06/15/2010    Smoker 06/15/2010     Current Outpatient Medications   Medication Sig Dispense Refill    potassium chloride SR (K-TAB) 20 mEq tablet TK 1 T PO QD WF      bumetanide (BUMEX) 2 mg tablet TK 1 T PO BID      amphetamine-dextroamphetamine XR (ADDERALL XR) 20 mg XR capsule Take 2 Caps by mouth every morning. Max Daily Amount: 40 mg. 60 Cap 0    donepeziL (ARICEPT) 10 mg tablet TAKE 2 TABLETS EVERY NIGHT 180 Tab 3    coenzyme q10 (CO Q-10) 10 mg cap Take  by mouth.  memantine (NAMENDA) 10 mg tablet TAKE 1 TABLET TWICE A  Tab 4    TOPROL XL 50 mg XL tablet TAKE 1 TABLET DAILY 90 Tab 4    NIASPAN 500 mg tablet TAKE 1 TABLET NIGHTLY 90 Tab 4    rosuvastatin (CRESTOR) 20 mg tablet TAKE 1 TABLET DAILY 90 Tab 4    risperiDONE (RISPERDAL) 0.5 mg tablet TAKE 1 TABLET BY MOUTH TWICE DAILY 180 Tab 5    escitalopram oxalate (LEXAPRO) 20 mg tablet TAKE 1 TABLET BY MOUTH DAILY 90 Tab 0    DOCOSAHEXANOIC ACID/EPA (FISH OIL PO) Take 1,200 mg by mouth daily.  PV W-O LAZARUS/FERROUS FUMARATE/FA (M-VIT PO) Take  by mouth.  aspirin (ASPIRIN) 325 mg tablet Take 325 mg by mouth daily.        No Known Allergies  Past Medical History:   Diagnosis Date    [facility]     CAD (coronary artery disease) 6/15/2010    DM (diabetes mellitus) ([facility] 6/15/2010    Falls     Heart disease     HTN (hypertension) 6/15/2010    Hypercholesteremia 6/15/2010    PTSD (post-traumatic stress disorder)     Skin cancer 6/15/2010    Smoker 6/15/2010    Snoring      Past Surgical History:   Procedure Laterality Date    CARDIAC SURG PROCEDURE UNLIST      stent     Family History   Problem Relation Age of Onset    Cancer Mother     Heart Disease Other      Social History     Tobacco Use    Smoking status: Current Every Day Smoker     Packs/day: 1.00    Smokeless tobacco: Never Used   Substance Use Topics    Alcohol use: Yes     Alcohol/week: 4.0 standard drinks     Types: 4 Cans of beer per week       Review of Systems   Eyes: Negative for blurred vision, double vision and photophobia. Respiratory: Negative for shortness of breath and wheezing. Gastrointestinal: Negative for nausea and vomiting. Musculoskeletal: Negative for falls. Neurological: Negative for dizziness and headaches. Psychiatric/Behavioral: Positive for memory loss. Negative for depression, hallucinations, substance abuse and suicidal ideas. The patient is not nervous/anxious and does not have insomnia.         Objective:   Vital Signs: (As obtained by patient/caregiver at home)  Visit Vitals  Ht 6' 1\" (1.854 m)   BMI 26.12 kg/m²        [INSTRUCTIONS:  \"[x]\" Indicates a positive item  \"[]\" Indicates a negative item  -- DELETE ALL ITEMS NOT EXAMINED]    Constitutional: [x] Appears well-developed and well-nourished [x] No apparent distress      [] Abnormal -     Mental status: [x] Alert and awake  [x] Oriented to person/place/time [x] Able to follow commands    [] Abnormal -     Eyes:   EOM    [x]  Normal    [] Abnormal -   Sclera  [x]  Normal    [] Abnormal -          Discharge [x]  None visible   [] Abnormal -     HENT: [x] Normocephalic, atraumatic  [] Abnormal -   [x] Mouth/Throat: Mucous membranes are moist    External Ears [x] Normal  [] Abnormal -    Neck: [x] No visualized mass [] Abnormal -     Pulmonary/Chest: [x] Respiratory effort normal   [x] No visualized signs of difficulty breathing or respiratory distress        [] Abnormal -      Musculoskeletal:   [x] Normal gait with no signs of ataxia         [x] Normal range of motion of neck        [] Abnormal -     Neurological:        [x] No Facial Asymmetry (Cranial nerve 7 motor function) (limited exam due to video visit)          [x] No gaze palsy        [] Abnormal -          Skin:        [x] No significant exanthematous lesions or discoloration noted on facial skin         [] Abnormal -            Psychiatric:       [x] Normal Affect [] Abnormal -        [x] No Hallucinations    Other pertinent observable physical exam findings:-      Discussed AD in full. No big changes. Try to switch Adderall to Adderall XR to help with better maintenance of day to day memory, cognition, ADHD, energy. Keep Namenda, Aricept. Keep brain active and engaged. Getting great care. We discussed the expected course, resolution and complications of the diagnosis(es) in detail. Medication risks, benefits, costs, interactions, and alternatives were discussed as indicated. I advised him to contact the office if his condition worsens, changes or fails to improve as anticipated. He expressed understanding with the diagnosis(es) and plan. Jae Beverly is a 79 y.o. male who was evaluated by a video visit encounter for concerns as above. Patient identification was verified prior to start of the visit. A caregiver was present when appropriate. Due to this being a TeleHealth encounter (During XPLTT-73 public health emergency), evaluation of the following organ systems was limited: Vitals/Constitutional/EENT/Resp/CV/GI//MS/Neuro/Skin/Heme-Lymph-Imm.   Pursuant to the emergency declaration under the [facility] waiver authority and the [employer] and [employer] Act, this Virtual  Visit was conducted, with patient's (and/or legal guardian's) consent, to reduce the patient's risk of exposure to COVID-19 and provide necessary medical care. Services were provided through a video synchronous discussion virtually to substitute for in-person clinic visit. Patient and provider were located at their individual homes.       Jarad Maradiaga NP

## 2020-04-28 NOTE — PROGRESS NOTES
Pt wife states he has gotten worse. His memory is bad. Date of Birth: [birth year]	Time of Birth:     Admission Weight (g): 1560    Admission Date and Time:  20 @ 18:31         Gestational Age: 34     Source of admission [ x ] Inborn     [ __ ]Transport from    [facility]:  Baby boy born to a 41 yo  female at 34.5 weeks of GA. BG O+, HIV NR, RPR NR, HepB negative, rubella immune, GBS positive, No ROM, was not in labor, clear fluid at CS. Covid neg x2 (4/3 and ). IVF pregnancy, Maternal h/o CHTN and severe preclampsia, on multiple meds, also on Mg bolus and infusion prior to delivery. S/P Beta 3/29-3/30, s/p rescue dose beta 20. Maternal h/o beta thalassemia. Baby came out crying, good tone. DCC for 40 seconds done. Dried and stimulated under radiant warmer. Transitioned well. APGAR 9/9 at 1 and 5 minutes respectively. Mother wants to breast feed, yes for circ and no for hepatitis B vaccine for the baby.    Social History: No history of alcohol/tobacco exposure obtained  FHx: non-contributory to the condition being treated or details of FH documented here  ROS: unable to obtain ()     PHYSICAL EXAM:    General:	         Awake and active;   Head:		AFOF  Eyes:		Normally set bilaterally  Ears:		Patent bilaterally, no deformities  Nose/Mouth:	Nares patent, palate intact  Neck:		No masses, intact clavicles  Chest/Lungs:      Breath sounds equal to auscultation. No retractions  CV:		No murmurs appreciated, normal pulses bilaterally  Abdomen:          Soft nontender nondistended, no masses, bowel sounds present  :		Normal for gestational age, hypospadias and chordee  Back:		Intact skin, no sacral dimples or tags  Anus:		Grossly patent  Extremities:	FROM, no hip clicks  Skin:		Pink, no lesions  Neuro exam:	Appropriate tone, activity    **************************************************************************************************  Age:3d    LOS:3d    Vital Signs:  T(C): 37.4 ( @ 05:30), Max: 37.9 ( @ 02:30)  HR: 158 ( @ 05:30) (123 - 165)  BP: 71/42 (05-10 @ 20:45) (71/42 - 71/42)  RR: 61 ( @ 05:30) (30 - 61)  SpO2: 98% ( @ 05:30) (94% - 100%)        LABS:         Blood type, Baby [] ABO: O  Rh; Positive DC; Negative                              19.5   10.27 )-----------( 211             [ @ 20:00]                  54.9  S 61.0%  B 0%  D Lo 0%  Myelo 0%  Promyelo 0%  Blasts 0%  Lymph 32.0%  Mono 2.0%  Eos 2.0%  Baso 1.0%  Retic 0%        136  |106  | 15     ------------------<42   Ca 8.6  Mg 4.3  Ph 3.6   [ @ 02:30]  6.7   | 17   | 0.89               Bili T/D  [ @ 02:15] - 6.4/0.3, Bili T/D  [05-10 @ 02:15] - 4.9/0.3          POCT Glucose:    49    [05:25] ,    48    [02:23] ,    60    [23:19] ,    57    [20:56] ,    56    [17:37] ,    46    [14:39] ,    46    [14:38] ,    72    [11:50]                                       **************************************************************************************************		  DISCHARGE PLANNING (date and status):  Hep B Vacc:  CCHD:			  :					  Hearing:    screen:	  Circumcision:  Hip US rec:  	  Synagis: 			  Other Immunizations (with dates):    		  Neurodevelop eval?	  CPR class done?  	  PVS at DC?  Vit D at DC?	  FE at DC?	    PMD:          Name:  ______________ _             Contact information:  ______________ _  Pharmacy: Name:  ______________ _              Contact information:  ______________ _    Follow-up appointments (list):      Time spent on the total subsequent encounter with >50% of the visit spent on counseling and/or coordination of care:[ _ ] 15 min[ _ ] 25 min[ _ ] 35 min  [ _ ] Discharge time spent >30 min   [ __ ] Car seat oximetry reviewed. Date of Birth: [birth year]	Time of Birth:     Admission Weight (g): 1560    Admission Date and Time:  20 @ 18:31         Gestational Age: 34     Source of admission [ x ] Inborn     [ __ ]Transport from    [facility]:  Baby boy born to a 39 yo  female at 34.5 weeks of GA. BG O+, HIV NR, RPR NR, HepB negative, rubella immune, GBS positive, No ROM, was not in labor, clear fluid at CS. Covid neg x2 (4/3 and ). IVF pregnancy, Maternal h/o CHTN and severe preclampsia, on multiple meds, also on Mg bolus and infusion prior to delivery. S/P Beta 3/29-3/30, s/p rescue dose beta 20. Maternal h/o beta thalassemia. Baby came out crying, good tone. DCC for 40 seconds done. Dried and stimulated under radiant warmer. Transitioned well. APGAR 9/9 at 1 and 5 minutes respectively. Mother wants to breast feed, yes for circ and no for hepatitis B vaccine for the baby.    Social History: No history of alcohol/tobacco exposure obtained  FHx: non-contributory to the condition being treated or details of FH documented here  ROS: unable to obtain ()     PHYSICAL EXAM:    General:	         Awake and active;   Head:		AFOF  Eyes:		Normally set bilaterally  Ears:		Patent bilaterally, no deformities  Nose/Mouth:	Nares patent, palate intact  Neck:		No masses, intact clavicles  Chest/Lungs:      Breath sounds equal to auscultation. No retractions  CV:		No murmurs appreciated, normal pulses bilaterally  Abdomen:          Soft nontender nondistended, no masses, bowel sounds present  :		Normal for gestational age, hypospadias and chordee  Back:		Intact skin, no sacral dimples or tags  Anus:		Grossly patent  Extremities:	FROM, no hip clicks  Skin:		Pink, no lesions  Neuro exam:	Appropriate tone, activity    **************************************************************************************************    Age:3d    LOS:3d    Vital Signs:  T(C): 36.9 ( @ 09:00), Max: 37.9 ( @ 02:30)  HR: 134 ( @ 09:00) (123 - 165)  BP: 66/46 ( @ 09:00) (66/46 - 71/42)  RR: 56 ( 09:00) (30 - 61)  SpO2: 100% ( @ 09:00) (94% - 100%)        LABS:         Blood type, Baby [] ABO: O  Rh; Positive DC; Negative                              19.5   10.27 )-----------( 211             [ @ 20:00]                  54.9  S 61.0%  B 0%  [place] 0%  Myelo 0%  Promyelo 0%  Blasts 0%  Lymph 32.0%  Mono 2.0%  Eos 2.0%  Baso 1.0%  Retic 0%        136  |106  | 15     ------------------<42   Ca 8.6  Mg 4.3  Ph 3.6   [ @ 02:30]  6.7   | 17   | 0.89               Bili T/D  [ @ 02:15] - 6.4/0.3, Bili T/D  [05-10 @ 02:15] - 4.9/0.3          POCT Glucose:    80    [08:17] ,    49    [05:25] ,    48    [02:23] ,    60    [23:19] ,    57    [20:56] ,    56    [17:37] ,    46    [14:39] ,    46    [14:38] ,    72    [11:50]                                             **************************************************************************************************		  DISCHARGE PLANNING (date and status):  Hep B Vacc:  CCHD:			  :					  Hearing:   [place] screen:	  Circumcision:  Hip US rec:  	  Synagis: 			  Other Immunizations (with dates):    		  Neurodevelop eval?	  CPR class done?  	  PVS at DC?  Vit D at DC?	  FE at DC?	    PMD:          Name:  ______________ _             Contact information:  ______________ _  Pharmacy: Name:  ______________ _              Contact information:  ______________ _    Follow-up appointments (list):      Time spent on the total subsequent encounter with >50% of the visit spent on counseling and/or coordination of care:[ _ ] 15 min[ _ ] 25 min[ _ ] 35 min  [ _ ] Discharge time spent >30 min   [ __ ] Car seat oximetry reviewed. Date of Birth: [birth year]	Time of Birth:     Admission Weight (g): 1560    Admission Date and Time:  20 @ 18:31         Gestational Age: 34     Source of admission [ x ] Inborn     [ __ ]Transport from    [facility] [place] [facility]":  Baby boy born to a 39 yo  female at 34.5 weeks of GA. BG O+, HIV NR, RPR NR, HepB negative, rubella immune, GBS positive, No ROM, was not in labor, clear fluid at CS. Covid neg x2 (4/3 and ). IVF pregnancy, Maternal h/o CHTN and severe preclampsia, on multiple meds, also on Mg bolus and infusion prior to delivery. S/P Beta 3/29-3/30, s/p rescue dose beta 20. Maternal h/o beta thalassemia. Baby came out crying, good tone. DCC for 40 seconds done. Dried and stimulated under radiant warmer. Transitioned well. APGAR 9/9 at 1 and 5 minutes respectively. Mother wants to breast feed, yes for circ and no for hepatitis B vaccine for the baby.    Social History: No history of alcohol/tobacco exposure obtained  FHx: non-contributory to the condition being treated or details of FH documented here  ROS: unable to obtain ()     PHYSICAL EXAM:    General:	         Awake and active;   Head:		AFOF  Eyes:		Normally set bilaterally  Ears:		Patent bilaterally, no deformities  Nose/Mouth:	Nares patent, palate intact  Neck:		No masses, intact clavicles  Chest/Lungs:      Breath sounds equal to auscultation. No retractions  CV:		No murmurs appreciated, normal pulses bilaterally  Abdomen:          Soft nontender nondistended, no masses, bowel sounds present  :		Normal for gestational age, hypospadias and chordee  Back:		Intact skin, no sacral dimples or tags  Anus:		Grossly patent  Extremities:	FROM, no hip clicks  Skin:		Pink, no lesions  Neuro exam:	Appropriate tone, activity    **************************************************************************************************    Age:3d    LOS:3d    Vital Signs:  T(C): 36.9 ( @ 09:00), Max: 37.9 ( @ 02:30)  HR: 134 ( @ 09:00) (123 - 165)  BP: 66/46 ( @ 09:00) (66/46 - 71/42)  RR: 56 ( 09:00) (30 - 61)  SpO2: 100% ( @ 09:00) (94% - 100%)        LABS:         Blood type, Baby [] ABO: O  Rh; Positive DC; Negative                              19.5   10.27 )-----------( 211             [ @ 20:00]                  54.9  S 61.0%  B 0%  [place] 0%  Myelo 0%  Promyelo 0%  Blasts 0%  Lymph 32.0%  Mono 2.0%  Eos 2.0%  Baso 1.0%  Retic 0%        136  |106  | 15     ------------------<42   Ca 8.6  Mg 4.3  Ph 3.6   [ @ 02:30]  6.7   | 17   | 0.89               Bili T/D  [ @ 02:15] - 6.4/0.3, Bili T/D  [05-10 @ 02:15] - 4.9/0.3          POCT Glucose:    80    [08:17] ,    49    [05:25] ,    48    [02:23] ,    60    [23:19] ,    57    [20:56] ,    56    [17:37] ,    46    [14:39] ,    46    [14:38] ,    72    [11:50]                                             **************************************************************************************************		  DISCHARGE PLANNING (date and status):  Hep B Vacc:   CCHD:			  :					  Hearing:    screen:	  Circumcision:  Hip US rec:  	  Synagis: 			  Other Immunizations (with dates):    		  Neurodevelop eval?	  CPR class done?  	  PVS at DC?  Vit D at DC?	  FE at DC?	    PMD:          Name:  ______________ _             Contact information:  ______________ _  Pharmacy: Name:  ______________ _              Contact information:  ______________ _    Follow-up appointments (list):      Time spent on the total subsequent encounter with >50% of the visit spent on counseling and/or coordination of care:[ _ ] 15 min[ _ ] 25 min[ _ ] 35 min  [ _ ] Discharge time spent >30 min   [ __ ] Car seat oximetry reviewed. Date of Birth: [birth year]	Time of Birth:     Admission Weight (g): 1560    Admission Date and Time:  20 @ 18:31         Gestational Age: 34     Source of admission [ x ] Inborn     [ __ ]Transport from    [facility]:  Baby boy born to a 39 yo  female at 34.5 weeks of GA. BG O+, HIV NR, RPR NR, HepB negative, rubella immune, GBS positive, No ROM, was not in labor, clear fluid at CS. Covid neg x2 (4/3 and ). IVF pregnancy, Maternal h/o CHTN and severe preclampsia, on multiple meds, also on Mg bolus and infusion prior to delivery. S/P Beta 3/29-3/30, s/p rescue dose beta 20. Maternal h/o beta thalassemia. Baby came out crying, good tone. DCC for 40 seconds done. Dried and stimulated under radiant warmer. Transitioned well. APGAR 9/9 at 1 and 5 minutes respectively. Mother wants to breast feed, yes for circ and no for hepatitis B vaccine for the baby.    Social History: No history of alcohol/tobacco exposure obtained  FHx: non-contributory to the condition being treated or details of FH documented here  ROS: unable to obtain ()     PHYSICAL EXAM:    General:	         Awake and active;   Head:		AFOF  Eyes:		Normally set bilaterally  Ears:		Patent bilaterally, no deformities  Nose/Mouth:	Nares patent, palate intact  Neck:		No masses, intact clavicles  Chest/Lungs:      Breath sounds equal to auscultation. No retractions  CV:		No murmurs appreciated, normal pulses bilaterally  Abdomen:          Soft nontender nondistended, no masses, bowel sounds present  :		Normal for gestational age, hypospadias and chordee  Back:		Intact skin, no sacral dimples or tags  Anus:		Grossly patent  Extremities:	FROM, no hip clicks  Skin:		Pink, no lesions  Neuro exam:	Appropriate tone, activity    **************************************************************************************************    Age:3d    LOS:3d    Vital Signs:  T(C): 36.9 ( @ 09:00), Max: 37.9 ( @ 02:30)  HR: 134 ( @ 09:00) (123 - 165)  BP: 66/46 ( @ 09:00) (66/46 - 71/42)  RR: 56 ( 09:00) (30 - 61)  SpO2: 100% ( @ 09:00) (94% - 100%)        LABS:         Blood type, Baby [] ABO: O  Rh; Positive DC; Negative                              19.5   10.27 )-----------( 211             [ @ 20:00]                  54.9  S 61.0%  B 0%  [place] 0%  Myelo 0%  Promyelo 0%  Blasts 0%  Lymph 32.0%  Mono 2.0%  Eos 2.0%  Baso 1.0%  Retic 0%        136  |106  | 15     ------------------<42   Ca 8.6  Mg 4.3  Ph 3.6   [ @ 02:30]  6.7   | 17   | 0.89               Bili T/D  [ @ 02:15] - 6.4/0.3, Bili T/D  [05-10 @ 02:15] - 4.9/0.3          POCT Glucose:    80    [08:17] ,    49    [05:25] ,    48    [02:23] ,    60    [23:19] ,    57    [20:56] ,    56    [17:37] ,    46    [14:39] ,    46    [14:38] ,    72    [11:50]                                             **************************************************************************************************		  DISCHARGE PLANNING (date and status):  Hep B Vacc:   CCHD:   passed 			  :					  Hearing:   [place] screen:	  Circumcision:  Hip US rec:  	  Synagis: 			  Other Immunizations (with dates):    		  Neurodevelop eval?	  CPR class done?  	  PVS at DC?  Vit D at DC?	  FE at DC?	    PMD:          Name:  ______________ _             Contact information:  ______________ _  Pharmacy: Name:  ______________ _              Contact information:  ______________ _    Follow-up appointments (list):      Time spent on the total subsequent encounter with >50% of the visit spent on counseling and/or coordination of care:[ _ ] 15 min[ _ ] 25 min[ _ ] 35 min  [ _ ] Discharge time spent >30 min   [ __ ] Car seat oximetry reviewed.

## 2020-05-28 DIAGNOSIS — F90.0 ATTENTION DEFICIT HYPERACTIVITY DISORDER (ADHD), PREDOMINANTLY INATTENTIVE TYPE: ICD-10-CM

## 2020-05-28 RX ORDER — DEXTROAMPHETAMINE SACCHARATE, AMPHETAMINE ASPARTATE MONOHYDRATE, DEXTROAMPHETAMINE SULFATE AND AMPHETAMINE SULFATE 5; 5; 5; 5 MG/1; MG/1; MG/1; MG/1
40 CAPSULE, EXTENDED RELEASE ORAL
Qty: 60 CAP | Refills: 0 | Status: SHIPPED | OUTPATIENT
Start: 2020-05-28 | End: 2020-06-23 | Stop reason: SDUPTHER

## 2020-06-23 ENCOUNTER — TELEPHONE (OUTPATIENT)
Dept: NEUROLOGY | Age: 70
End: 2020-06-23

## 2020-06-23 DIAGNOSIS — F90.0 ATTENTION DEFICIT HYPERACTIVITY DISORDER (ADHD), PREDOMINANTLY INATTENTIVE TYPE: ICD-10-CM

## 2020-06-23 RX ORDER — DEXTROAMPHETAMINE SACCHARATE, AMPHETAMINE ASPARTATE MONOHYDRATE, DEXTROAMPHETAMINE SULFATE AND AMPHETAMINE SULFATE 5; 5; 5; 5 MG/1; MG/1; MG/1; MG/1
40 CAPSULE, EXTENDED RELEASE ORAL
Qty: 60 CAP | Refills: 0 | Status: SHIPPED | OUTPATIENT
Start: 2020-06-23 | End: 2020-07-28 | Stop reason: SDUPTHER

## 2020-07-16 NOTE — PATIENT INSTRUCTIONS
[facility]   Statement to Patients  April 1, 2014      In an effort to ensure the large volume of patient prescription refills is processed in the most efficient and expeditious manner, we are asking our patients to assist us by calling your Pharmacy for all prescription refills, this will include also your  Mail Order Pharmacy. The pharmacy will contact our office electronically to continue the refill process. Please do not wait until the last minute to call your pharmacy. We need at least 48 hours (2days) to fill prescriptions. We also encourage you to call your pharmacy before going to  your prescription to make sure it is ready. With regard to controlled substance prescription refill requests (narcotic refills) that need to be picked up at our office, we ask your cooperation by providing us with at least 72 hours (3days) notice that you will need a refill. We will not refill narcotic prescription refill requests after 4:00pm on any weekday, Monday through Thursday, or after 2:00pm on Fridays, or on the weekends. We encourage everyone to explore another way of getting your prescription refill request processed using [employer], our patient web portal through our electronic medical record system. [employer] is an efficient and effective way to communicate your medication request directly to the office and  downloadable as an kevin on your smart phone . [employer] also features a review functionality that allows you to view your medication list as well as leave messages for your physician. Are you ready to get connected? If so please review the attatched instructions or speak to any of our staff to get you set up right away! Thank you so much for your cooperation. Should you have any questions please contact our Practice Administrator. The Physicians and Staff,  [facility] [id number] [address]  What is a living will?   A Wife aware of procedure instructions for 7/23/2020, 9 am, [facility] with Dr KALEIGH Golden. Pre ops are done. She verbalized understanding.   living will is a legal form you use to write down the kind of care you want at the end of your life. It is used by the health professionals who will treat you if you aren't able to decide for yourself. If you put your wishes in writing, your loved ones and others will know what kind of care you want. They won't need to guess. This can ease your mind and be helpful to others. A living will is not the same as an estate or property will. An estate will explains what you want to happen with your money and property after you die. Is a living will a legal document? A living will is a legal document. Each state has its own laws about living [place]. If you move to another state, make sure that your living will is legal in the state where you now live. Or you might use a universal form that has been approved by many states. This kind of form can sometimes be completed and stored online. Your electronic copy will then be available wherever you have a connection to the Internet. In most cases, doctors will respect your wishes even if you have a form from a different state. · You don't need an  to complete a living will. But legal advice can be helpful if your state's laws are unclear, your health history is complicated, or your family can't agree on what should be in your living will. · You can change your living will at any time. Some people find that their wishes about end-of-life care change as their health changes. · In addition to making a living will, think about completing a medical power of  form. This form lets you name the person you want to make end-of-life treatment decisions for you (your \"health care agent\") if you're not able to. Many hospitals and nursing homes will give you the forms you need to complete a living will and a medical power of . · Your living will is used only if you can't make or communicate decisions for yourself anymore.  If you become able to make decisions again, you can accept or refuse any treatment, no matter what you wrote in your living will. · Your state may offer an online registry. This is a place where you can store your living will online so the doctors and nurses who need to treat you can find it right away. What should you think about when creating a living will? Talk about your end-of-life wishes with your family members and your doctor. Let them know what you want. That way the people making decisions for you won't be surprised by your choices. Think about these questions as you make your living will:  · Do you know enough about life support methods that might be used? If not, talk to your doctor so you know what might be done if you can't breathe on your own, your heart stops, or you're unable to swallow. · What things would you still want to be able to do after you receive life-support methods? Would you want to be able to walk? To speak? To eat on your own? To live without the help of machines? · If you have a choice, where do you want to be cared for? In your home? At a hospital or nursing home? · Do you want certain Adventist practices performed if you become very ill? · If you have a choice at the end of your life, where would you prefer to die? At home? In a hospital or nursing home? Somewhere else? · Would you prefer to be buried or cremated? · Do you want your organs to be donated after you die? What should you do with your living will? · Make sure that your family members and your health care agent have copies of your living will. · Give your doctor a copy of your living will to keep in your medical record. If you have more than one doctor, make sure that each one has a copy. · You may want to put a copy of your living will where it can be easily found. Where can you learn more? Go to http://olu-vick.info/. Enter V774 in the search box to learn more about \"Learning About Living [place]. \"  Current as of: August 8, 2016  Content Version: 11.3  © 8421-3584 [employer]. Care instructions adapted under license by [employer] (which disclaims liability or warranty for this information). If you have questions about a medical condition or this instruction, always ask your healthcare professional. [address] any warranty or liability for your use of this information. Advance Directives: Care Instructions  Your Care Instructions  An advance directive is a legal way to state your wishes at the end of your life. It tells your family and your doctor what to do if you can no longer say what you want. There are two main types of advance directives. You can change them any time that your wishes change. · A living will tells your family and your doctor your wishes about life support and other treatment. · A durable power of  for health care lets you name a person to make treatment decisions for you when you can't speak for yourself. This person is called a health care agent. If you do not have an advance directive, decisions about your medical care may be made by a doctor or a  who doesn't know you. It may help to think of an advance directive as a gift to the people who care for you. If you have one, they won't have to make tough decisions by themselves. Follow-up care is a key part of your treatment and safety. Be sure to make and go to all appointments, and call your doctor if you are having problems. It's also a good idea to know your test results and keep a list of the medicines you take. How can you care for yourself at home? · Discuss your wishes with your loved ones and your doctor. This way, there are no surprises. · Many states have a unique form. Or you might use a universal form that has been approved by many states. This kind of form can sometimes be completed and stored online.  Your electronic copy will then be available wherever you have a connection to the Internet. In most cases, doctors will respect your wishes even if you have a form from a different state. · You don't need a  to do an advance directive. But you may want to get legal advice. · Think about these questions when you prepare an advance directive:  ¨ Who do you want to make decisions about your medical care if you are not able to? Many people choose a family member or close friend. ¨ Do you know enough about life support methods that might be used? If not, talk to your doctor so you understand. ¨ What are you most afraid of that might happen? You might be afraid of having pain, losing your independence, or being kept alive by machines. ¨ Where would you prefer to die? Choices include your home, a hospital, or a nursing home. ¨ Would you like to have information about hospice care to support you and your family? ¨ Do you want to donate organs when you die? ¨ Do you want certain Jew practices performed before you die? If so, put your wishes in the advance directive. · Read your advance directive every year, and make changes as needed. When should you call for help? Be sure to contact your doctor if you have any questions. Where can you learn more? Go to http://olu-vick.info/. Enter R264 in the search box to learn more about \"Advance Directives: Care Instructions. \"  Current as of: November 17, 2016  Content Version: 11.3  © 7565-5534 [employer]. Care instructions adapted under license by [email or web address] (which disclaims liability or warranty for this information). If you have questions about a medical condition or this instruction, always ask your healthcare professional. [address] any warranty or liability for your use of this information.

## 2020-07-28 ENCOUNTER — OFFICE VISIT (OUTPATIENT)
Dept: NEUROLOGY | Age: 70
End: 2020-07-28

## 2020-07-28 VITALS
SYSTOLIC BLOOD PRESSURE: 110 MMHG | OXYGEN SATURATION: 98 % | WEIGHT: 182 LBS | DIASTOLIC BLOOD PRESSURE: 62 MMHG | HEART RATE: 96 BPM | BODY MASS INDEX: 24.01 KG/M2 | TEMPERATURE: 98.9 F

## 2020-07-28 DIAGNOSIS — G30.0 EARLY ONSET ALZHEIMER'S DEMENTIA WITHOUT BEHAVIORAL DISTURBANCE (HCC): ICD-10-CM

## 2020-07-28 DIAGNOSIS — F02.81 ALZHEIMER'S DEMENTIA WITH BEHAVIORAL DISTURBANCE, UNSPECIFIED TIMING OF DEMENTIA ONSET: ICD-10-CM

## 2020-07-28 DIAGNOSIS — R53.83 FATIGUE, UNSPECIFIED TYPE: ICD-10-CM

## 2020-07-28 DIAGNOSIS — F02.80 EARLY ONSET ALZHEIMER'S DEMENTIA WITHOUT BEHAVIORAL DISTURBANCE (HCC): Primary | ICD-10-CM

## 2020-07-28 DIAGNOSIS — F90.0 ATTENTION DEFICIT HYPERACTIVITY DISORDER (ADHD), PREDOMINANTLY INATTENTIVE TYPE: ICD-10-CM

## 2020-07-28 DIAGNOSIS — R41.0 CONFUSION: ICD-10-CM

## 2020-07-28 DIAGNOSIS — F02.80 EARLY ONSET ALZHEIMER'S DEMENTIA WITHOUT BEHAVIORAL DISTURBANCE (HCC): ICD-10-CM

## 2020-07-28 DIAGNOSIS — G30.9 ALZHEIMER'S DEMENTIA WITH BEHAVIORAL DISTURBANCE, UNSPECIFIED TIMING OF DEMENTIA ONSET: ICD-10-CM

## 2020-07-28 DIAGNOSIS — G30.0 EARLY ONSET ALZHEIMER'S DEMENTIA WITHOUT BEHAVIORAL DISTURBANCE (HCC): Primary | ICD-10-CM

## 2020-07-28 RX ORDER — DEXTROAMPHETAMINE SACCHARATE, AMPHETAMINE ASPARTATE MONOHYDRATE, DEXTROAMPHETAMINE SULFATE AND AMPHETAMINE SULFATE 5; 5; 5; 5 MG/1; MG/1; MG/1; MG/1
40 CAPSULE, EXTENDED RELEASE ORAL
Qty: 60 CAP | Refills: 0 | Status: SHIPPED | OUTPATIENT
Start: 2020-07-28 | End: 2020-09-09 | Stop reason: SDUPTHER

## 2020-07-28 RX ORDER — DIAZEPAM 10 MG/1
TABLET ORAL
Qty: 1 TAB | Refills: 0 | Status: SHIPPED | OUTPATIENT
Start: 2020-07-28 | End: 2020-11-05

## 2020-07-28 RX ORDER — ESCITALOPRAM OXALATE 20 MG/1
TABLET ORAL
Qty: 90 TAB | Refills: 1 | Status: SHIPPED | OUTPATIENT
Start: 2020-07-28 | End: 2021-01-19

## 2020-07-28 NOTE — PROGRESS NOTES
Sophronia Baumgarten is a 79 y.o. male who presents with the following  Chief Complaint   Patient presents with    Follow-up    Alzheimers       HPI       Patient comes in with  for a follow up for dementia. Things continue to get worse. They have noticed a decline in energy, focusing, memory all together. He is not speaking very well. He has been very off balance. He is very weak and tired. He is not eating very well but is taking in calories. Taking medication with prompting. He is now fully moved in with his daughter. Has grandkids there also to keep him busy. He does like to watch tv, listen to music. Wife states he does sleep a lot. On Aricept 20 mg and Namenda 10 mg BID. On Adderall 40 mg XR daily also. No recent falls. He is incontinent of bowel and bladder at times. Does like to sit on the deck. No Known Allergies    Current Outpatient Medications   Medication Sig    diazePAM (Valium) 10 mg tablet Take tablet 30-45 minutes before MRI. Must have  drive home after.  escitalopram oxalate (LEXAPRO) 20 mg tablet TAKE 1 TABLET BY MOUTH DAILY    amphetamine-dextroamphetamine XR (ADDERALL XR) 20 mg XR capsule Take 2 Caps by mouth every morning. Max Daily Amount: 40 mg.  potassium chloride SR (K-TAB) 20 mEq tablet TK 1 T PO QD WF    donepeziL (ARICEPT) 10 mg tablet TAKE 2 TABLETS EVERY NIGHT    memantine (NAMENDA) 10 mg tablet TAKE 1 TABLET TWICE A DAY    TOPROL XL 50 mg XL tablet TAKE 1 TABLET DAILY    NIASPAN 500 mg tablet TAKE 1 TABLET NIGHTLY    rosuvastatin (CRESTOR) 20 mg tablet TAKE 1 TABLET DAILY    DOCOSAHEXANOIC ACID/EPA (FISH OIL PO) Take 1,200 mg by mouth daily.  PV W-O LAZARUS/FERROUS FUMARATE/FA (M-VIT PO) Take  by mouth.  aspirin (ASPIRIN) 325 mg tablet Take 325 mg by mouth daily. No current facility-administered medications for this visit.         Social History     Tobacco Use   Smoking Status Current Every Day Smoker    Packs/day: 1.00   Smokeless Tobacco Never Used       Past Medical History:   Diagnosis Date    Alcoholism ([facility]     CAD (coronary artery disease) 6/15/2010    DM (diabetes mellitus) ([facility] 6/15/2010    Falls     Heart disease     HTN (hypertension) 6/15/2010    Hypercholesteremia 6/15/2010    PTSD (post-traumatic stress disorder)     Skin cancer 6/15/2010    Smoker 6/15/2010    Snoring        Past Surgical History:   Procedure Laterality Date    CARDIAC SURG PROCEDURE UNLIST      stent       Family History   Problem Relation Age of Onset    Cancer Mother     Heart Disease Other        Social History     Socioeconomic History    Marital status:      Spouse name: Not on file    Number of children: Not on file    Years of education: Not on file    Highest education level: Not on file   Tobacco Use    Smoking status: Current Every Day Smoker     Packs/day: 1.00    Smokeless tobacco: Never Used   Substance and Sexual Activity    Alcohol use: Yes     Alcohol/week: 4.0 standard drinks     Types: 4 Cans of beer per week    Drug use: No    Sexual activity: Never       Review of Systems   Constitutional: Positive for malaise/fatigue. Eyes: Negative for blurred vision, double vision and photophobia. Respiratory: Negative for shortness of breath and wheezing. Gastrointestinal: Negative for nausea and vomiting. Musculoskeletal: Negative for falls. Neurological: Positive for weakness. Negative for dizziness, tingling, seizures, loss of consciousness and headaches. Psychiatric/Behavioral: Positive for memory loss. Negative for depression, hallucinations, substance abuse and suicidal ideas. The patient is not nervous/anxious and does not have insomnia. Remainder of comprehensive review is negative.      Physical Exam :    Visit Vitals  /62   Pulse 96   Temp 98.9 °F (37.2 °C)   Wt 82.6 kg (182 lb)   SpO2 98%   BMI 24.01 kg/m²       General: Well defined, nourished, and groomed individual in no acute distress.    Neck: Supple, nontender, no bruits, no pain with resistance to active range of motion.    Heart: Regular rate and rhythm, no murmurs, rub, or gallop. Normal S1S2. Lungs: Clear to auscultation bilaterally with equal chest expansion, no cough, no wheeze  Musculoskeletal: Extremities revealed no edema and had full range of motion of joints.    Psych: Good mood and bright affect    NEUROLOGICAL EXAMINATION:    Mental Status: Alert and oriented to person, place not time. mmse 17 of what he could discuss     Cranial Nerves:    II, III, IV, VI: Visual acuity grossly intact. Visual fields are normal.    Pupils are equal, round, and reactive to light and accommodation.    Extra-ocular movements are full and fluid. Fundoscopic exam was benign, no ptosis or nystagmus.    V-XII: Hearing is grossly intact. Facial features are symmetric, with normal sensation and strength. The palate rises symmetrically and the tongue protrudes midline. Sternocleidomastoids 5/5. Motor Examination: Normal tone, bulk, and strength, 3/5 muscle strength throughout. Coordination: Finger to nose was normal. No resting or intention tremor    Gait and Station: Steady while walking but slow to arise, move     Results for orders placed or performed during the hospital encounter of 19/72/39   METABOLIC PANEL, COMPREHENSIVE   Result Value Ref Range    Sodium 139 136 - 145 mmol/L    Potassium 3.9 3.5 - 5.1 mmol/L    Chloride 104 97 - 108 mmol/L    CO2 30 21 - 32 mmol/L    Anion gap 5 5 - 15 mmol/L    Glucose 133 (H) 65 - 100 mg/dL    BUN 19 6 - 20 MG/DL    Creatinine 1.17 0.70 - 1.30 MG/DL    BUN/Creatinine ratio 16 12 - 20      GFR est AA >60 >60 ml/min/1.73m2    GFR est non-AA >60 >60 ml/min/1.73m2    Calcium 9.7 8.5 - 10.1 MG/DL    Bilirubin, total 0.4 0.2 - 1.0 MG/DL    ALT (SGPT) 36 12 - 78 U/L    AST (SGOT) 31 15 - 37 U/L    Alk.  phosphatase 92 45 - 117 U/L    Protein, total 7.4 6.4 - 8.2 g/dL    Albumin 4.0 3.5 - 5.0 g/dL    Globulin 3.4 2.0 - 4.0 g/dL    A-G Ratio 1.2 1.1 - 2.2     CBC WITH AUTOMATED DIFF   Result Value Ref Range    WBC 7.5 4.1 - 11.1 K/uL    RBC 4.68 4.10 - 5.70 M/uL    HGB 15.0 12.1 - 17.0 g/dL    HCT 47.9 36.6 - 50.3 %    .4 (H) 80.0 - 99.0 FL    MCH 32.1 26.0 - 34.0 PG    MCHC 31.3 30.0 - 36.5 g/dL    RDW 13.4 11.5 - 14.5 %    PLATELET 292 (L) 741 - 400 K/uL    MPV 12.3 8.9 - 12.9 FL    NRBC 0.0 0  WBC    ABSOLUTE NRBC 0.00 0.00 - 0.01 K/uL    NEUTROPHILS 53 32 - 75 %    LYMPHOCYTES 36 12 - 49 %    MONOCYTES 7 5 - 13 %    EOSINOPHILS 3 0 - 7 %    BASOPHILS 1 0 - 1 %    IMMATURE GRANULOCYTES 0 0.0 - 0.5 %    ABS. NEUTROPHILS 3.9 1.8 - 8.0 K/UL    ABS. LYMPHOCYTES 2.7 0.8 - 3.5 K/UL    ABS. MONOCYTES 0.6 0.0 - 1.0 K/UL    ABS. EOSINOPHILS 0.2 0.0 - 0.4 K/UL    ABS. BASOPHILS 0.1 0.0 - 0.1 K/UL    ABS. IMM. GRANS. 0.0 0.00 - 0.04 K/UL    DF AUTOMATED     NT-PRO BNP   Result Value Ref Range    NT pro-BNP 78 <125 PG/ML   LIPID PANEL   Result Value Ref Range    LIPID PROFILE          Cholesterol, total 158 <200 MG/DL    Triglyceride 189 (H) <150 MG/DL    HDL Cholesterol 78 MG/DL    LDL, calculated 42.2 0 - 100 MG/DL    VLDL, calculated 37.8 MG/DL    CHOL/HDL Ratio 2.0 0.0 - 5.0     HEMOGLOBIN A1C WITH EAG   Result Value Ref Range    Hemoglobin A1c 6.2 (H) 4.0 - 5.6 %    Est. average glucose 131 mg/dL       Orders Placed This Encounter    MRI BRAIN W WO CONT     Standing Status:   Future     Standing Expiration Date:   8/28/2021     Order Specific Question:   STAT Creatinine as indicated     Answer:    Yes    CBC WITH AUTOMATED DIFF     Standing Status:   Future     Standing Expiration Date:   7/28/2021    URINALYSIS W/ REFLEX CULTURE     Standing Status:   Future     Standing Expiration Date:   0/18/5357    METABOLIC PANEL, COMPREHENSIVE     Standing Status:   Future     Standing Expiration Date:   7/28/2021    HEPATIC FUNCTION PANEL     Standing Status:   Future     Standing Expiration Date:   7/28/2021    VITAMIN B12 & FOLATE     Standing Status:   Future     Standing Expiration Date:   7/28/2021    VITAMIN D, 25 HYDROXY     Standing Status:   Future     Standing Expiration Date:   7/28/2021    diazePAM (Valium) 10 mg tablet     Sig: Take tablet 30-45 minutes before MRI. Must have  drive home after. Dispense:  1 Tab     Refill:  0    escitalopram oxalate (LEXAPRO) 20 mg tablet     Sig: TAKE 1 TABLET BY MOUTH DAILY     Dispense:  90 Tab     Refill:  1     **Patient requests 90 days supply**    amphetamine-dextroamphetamine XR (ADDERALL XR) 20 mg XR capsule     Sig: Take 2 Caps by mouth every morning. Max Daily Amount: 40 mg. Dispense:  60 Cap     Refill:  0       1. Early onset Alzheimer's dementia without behavioral disturbance ([facility]    2. Confusion    3. Fatigue, unspecified type    4. Alzheimer's dementia with behavioral disturbance, unspecified timing of dementia onset ([address]    5. Attention deficit hyperactivity disorder (ADHD), predominantly inattentive type          Discussed causes. Will want an MRI brain to rule out stroke, tumor, lesions as possible causes. We will also look at atrophy. Get CBC, CMP, hepatic, vitamin b12, d, urine culture to look at causes including deficiencies, infection, electrolyte imbalance. Keep all current medications. Refer to [address] [place] [address] For PT, OT, speech and dementia.                This note will not be viewable in [facility]

## 2020-08-03 LAB
25(OH)D3+25(OH)D2 SERPL-MCNC: 42.3 NG/ML (ref 30–100)
ALBUMIN SERPL-MCNC: 4.8 G/DL (ref 3.8–4.8)
ALBUMIN/GLOB SERPL: 1.9 {RATIO} (ref 1.2–2.2)
ALP SERPL-CCNC: 93 IU/L (ref 39–117)
ALT SERPL-CCNC: 18 IU/L (ref 0–44)
AST SERPL-CCNC: 29 IU/L (ref 0–40)
BASOPHILS # BLD AUTO: 0.1 X10E3/UL (ref 0–0.2)
BASOPHILS NFR BLD AUTO: 1 %
BILIRUB DIRECT SERPL-MCNC: 0.09 MG/DL (ref 0–0.4)
BILIRUB SERPL-MCNC: 0.3 MG/DL (ref 0–1.2)
BUN SERPL-MCNC: 17 MG/DL (ref 8–27)
BUN/CREAT SERPL: 11 (ref 10–24)
CALCIUM SERPL-MCNC: 10.4 MG/DL (ref 8.6–10.2)
CHLORIDE SERPL-SCNC: 94 MMOL/L (ref 96–106)
CO2 SERPL-SCNC: 26 MMOL/L (ref 20–29)
CREAT SERPL-MCNC: 1.56 MG/DL (ref 0.76–1.27)
EOSINOPHIL # BLD AUTO: 0.2 X10E3/UL (ref 0–0.4)
EOSINOPHIL NFR BLD AUTO: 3 %
ERYTHROCYTE [DISTWIDTH] IN BLOOD BY AUTOMATED COUNT: 12.5 % (ref 11.6–15.4)
FOLATE SERPL-MCNC: >20 NG/ML
GLOBULIN SER CALC-MCNC: 2.5 G/DL (ref 1.5–4.5)
GLUCOSE SERPL-MCNC: 148 MG/DL (ref 65–99)
GLUCOSE UR QL: NORMAL
HCT VFR BLD AUTO: 44.2 % (ref 37.5–51)
HGB BLD-MCNC: 15.1 G/DL (ref 13–17.7)
IMM GRANULOCYTES # BLD AUTO: 0 X10E3/UL (ref 0–0.1)
IMM GRANULOCYTES NFR BLD AUTO: 0 %
KETONES UR QL STRIP: NORMAL
LYMPHOCYTES # BLD AUTO: 2.8 X10E3/UL (ref 0.7–3.1)
LYMPHOCYTES NFR BLD AUTO: 31 %
MCH RBC QN AUTO: 31.9 PG (ref 26.6–33)
MCHC RBC AUTO-ENTMCNC: 34.2 G/DL (ref 31.5–35.7)
MCV RBC AUTO: 93 FL (ref 79–97)
MONOCYTES # BLD AUTO: 0.7 X10E3/UL (ref 0.1–0.9)
MONOCYTES NFR BLD AUTO: 8 %
NEUTROPHILS # BLD AUTO: 5.2 X10E3/UL (ref 1.4–7)
NEUTROPHILS NFR BLD AUTO: 57 %
PH UR STRIP: NORMAL [PH]
PLATELET # BLD AUTO: 193 X10E3/UL (ref 150–450)
POTASSIUM SERPL-SCNC: 3.3 MMOL/L (ref 3.5–5.2)
PROT SERPL-MCNC: 7.3 G/DL (ref 6–8.5)
PROT UR QL STRIP: NORMAL
RBC # BLD AUTO: 4.74 X10E6/UL (ref 4.14–5.8)
SODIUM SERPL-SCNC: 143 MMOL/L (ref 134–144)
SP GR UR: NORMAL
VIT B12 SERPL-MCNC: 870 PG/ML (ref 232–1245)
WBC # BLD AUTO: 9 X10E3/UL (ref 3.4–10.8)

## 2020-08-13 ENCOUNTER — TELEPHONE (OUTPATIENT)
Dept: NEUROLOGY | Age: 70
End: 2020-08-13

## 2020-08-13 ENCOUNTER — HOSPITAL ENCOUNTER (OUTPATIENT)
Dept: MRI IMAGING | Age: 70
Discharge: HOME | End: 2020-08-13
Attending: NURSE PRACTITIONER
Payer: MEDICARE

## 2020-08-13 DIAGNOSIS — G30.0 EARLY ONSET ALZHEIMER'S DEMENTIA WITHOUT BEHAVIORAL DISTURBANCE (HCC): ICD-10-CM

## 2020-08-13 DIAGNOSIS — R53.83 FATIGUE, UNSPECIFIED TYPE: ICD-10-CM

## 2020-08-13 DIAGNOSIS — R41.0 CONFUSION: ICD-10-CM

## 2020-08-13 DIAGNOSIS — F02.80 EARLY ONSET ALZHEIMER'S DEMENTIA WITHOUT BEHAVIORAL DISTURBANCE (HCC): ICD-10-CM

## 2020-08-13 PROCEDURE — 70551 MRI BRAIN STEM W/O DYE: CPT

## 2020-08-13 NOTE — TELEPHONE ENCOUNTER
Called and left a VM letting his wife know Minerva Renner is out of the office but this week I will send this message to him so he can respond next week.

## 2020-08-13 NOTE — TELEPHONE ENCOUNTER
Pt had his MRI done today. Pt couldn't get the contrast because they said he was dehydrated. Pt's wife would like to know what she should do to help him. She said he drinks water all day.  Please call back

## 2020-08-17 NOTE — TELEPHONE ENCOUNTER
Called and spoke with the wife explained results was told to increase fluids and try gatorade pt's wife states he will not drink the gatorade.

## 2020-09-09 DIAGNOSIS — F90.0 ATTENTION DEFICIT HYPERACTIVITY DISORDER (ADHD), PREDOMINANTLY INATTENTIVE TYPE: ICD-10-CM

## 2020-09-09 RX ORDER — DEXTROAMPHETAMINE SACCHARATE, AMPHETAMINE ASPARTATE MONOHYDRATE, DEXTROAMPHETAMINE SULFATE AND AMPHETAMINE SULFATE 5; 5; 5; 5 MG/1; MG/1; MG/1; MG/1
40 CAPSULE, EXTENDED RELEASE ORAL
Qty: 60 CAP | Refills: 0 | Status: SHIPPED | OUTPATIENT
Start: 2020-09-09 | End: 2021-03-05 | Stop reason: ALTCHOICE

## 2020-11-05 ENCOUNTER — OFFICE VISIT (OUTPATIENT)
Dept: NEUROLOGY | Age: 70
End: 2020-11-05
Payer: MEDICARE

## 2020-11-05 VITALS
TEMPERATURE: 97.5 F | SYSTOLIC BLOOD PRESSURE: 110 MMHG | BODY MASS INDEX: 23.48 KG/M2 | DIASTOLIC BLOOD PRESSURE: 69 MMHG | HEART RATE: 62 BPM | WEIGHT: 178 LBS | OXYGEN SATURATION: 98 %

## 2020-11-05 DIAGNOSIS — G30.9 ALZHEIMER'S DEMENTIA WITHOUT BEHAVIORAL DISTURBANCE, UNSPECIFIED TIMING OF DEMENTIA ONSET: Primary | ICD-10-CM

## 2020-11-05 DIAGNOSIS — F02.80 ALZHEIMER'S DEMENTIA WITHOUT BEHAVIORAL DISTURBANCE, UNSPECIFIED TIMING OF DEMENTIA ONSET: Primary | ICD-10-CM

## 2020-11-05 PROCEDURE — G8420 CALC BMI NORM PARAMETERS: HCPCS | Performed by: NURSE PRACTITIONER

## 2020-11-05 PROCEDURE — 99213 OFFICE O/P EST LOW 20 MIN: CPT | Performed by: NURSE PRACTITIONER

## 2020-11-05 PROCEDURE — G8754 DIAS BP LESS 90: HCPCS | Performed by: NURSE PRACTITIONER

## 2020-11-05 PROCEDURE — G8752 SYS BP LESS 140: HCPCS | Performed by: NURSE PRACTITIONER

## 2020-11-05 PROCEDURE — G8432 DEP SCR NOT DOC, RNG: HCPCS | Performed by: NURSE PRACTITIONER

## 2020-11-05 PROCEDURE — G8427 DOCREV CUR MEDS BY ELIG CLIN: HCPCS | Performed by: NURSE PRACTITIONER

## 2020-11-05 PROCEDURE — 3017F COLORECTAL CA SCREEN DOC REV: CPT | Performed by: NURSE PRACTITIONER

## 2020-11-05 PROCEDURE — G8536 NO DOC ELDER MAL SCRN: HCPCS | Performed by: NURSE PRACTITIONER

## 2020-11-05 PROCEDURE — 1101F PT FALLS ASSESS-DOCD LE1/YR: CPT | Performed by: NURSE PRACTITIONER

## 2020-11-05 RX ORDER — AMOXICILLIN 500 MG/1
CAPSULE ORAL
COMMUNITY
Start: 2020-11-04

## 2020-11-05 RX ORDER — RISPERIDONE 0.25 MG/1
TABLET, FILM COATED ORAL
Qty: 30 TAB | Refills: 5 | Status: SHIPPED | OUTPATIENT
Start: 2020-11-05 | End: 2021-02-02

## 2020-11-05 RX ORDER — BUMETANIDE 2 MG/1
TABLET ORAL
COMMUNITY
Start: 2020-10-26 | End: 2020-11-05

## 2020-11-05 NOTE — PROGRESS NOTES
Liza Koch is a 79 y.o. male who presents with the following  Chief Complaint   Patient presents with    Follow-up    Alzheimers       HPI        Patient comes in with wife for a follow up for dementia. Things continue to get worse. They have noticed a decline in energy, focusing, memory all together. He is not speaking very well. He has been very off balance. He is very weak and tired. He is not eating very well but is taking in calories. Taking medication with prompting. Did PT OT and is still getting speech. Continuing to decline. He was agitated once with wife and pushed her. Not since then.      He is now fully moved in with his daughter. Has grandkids there also to keep him busy. He does like to watch tv, listen to music. Wife states he does sleep a lot. On Aricept 20 mg and Namenda 10 mg BID. On Adderall 40 mg XR daily also. No recent falls.      He is incontinent of bowel and bladder at times. Does like to sit on the deck. No Known Allergies    Current Outpatient Medications   Medication Sig    amoxicillin (AMOXIL) 500 mg capsule     risperiDONE (RisperDAL) 0.25 mg tablet Take 1 tablet by mouth BID PRN for agitation    amphetamine-dextroamphetamine XR (ADDERALL XR) 20 mg XR capsule Take 2 Caps by mouth every morning. Max Daily Amount: 40 mg.    escitalopram oxalate (LEXAPRO) 20 mg tablet TAKE 1 TABLET BY MOUTH DAILY    potassium chloride SR (K-TAB) 20 mEq tablet TK 1 T PO QD WF    donepeziL (ARICEPT) 10 mg tablet TAKE 2 TABLETS EVERY NIGHT    memantine (NAMENDA) 10 mg tablet TAKE 1 TABLET TWICE A DAY    TOPROL XL 50 mg XL tablet TAKE 1 TABLET DAILY    NIASPAN 500 mg tablet TAKE 1 TABLET NIGHTLY    rosuvastatin (CRESTOR) 20 mg tablet TAKE 1 TABLET DAILY    DOCOSAHEXANOIC ACID/EPA (FISH OIL PO) Take 1,200 mg by mouth daily.  PV W-O LAZARUS/FERROUS FUMARATE/FA (M-VIT PO) Take  by mouth.     aspirin (ASPIRIN) 325 mg tablet Take 325 mg by mouth daily. No current facility-administered medications for this visit. Social History     Tobacco Use   Smoking Status Current Every Day Smoker    Packs/day: 1.00   Smokeless Tobacco Never Used       Past Medical History:   Diagnosis Date    Alcoholism ([facility]     CAD (coronary artery disease) 6/15/2010    DM (diabetes mellitus) ([facility] 6/15/2010    Falls     Heart disease     HTN (hypertension) 6/15/2010    Hypercholesteremia 6/15/2010    PTSD (post-traumatic stress disorder)     Skin cancer 6/15/2010    Smoker 6/15/2010    Snoring        Past Surgical History:   Procedure Laterality Date    CARDIAC SURG PROCEDURE UNLIST      stent       Family History   Problem Relation Age of Onset    Cancer Mother     Heart Disease Other        Social History     Socioeconomic History    Marital status:      Spouse name: Not on file    Number of children: Not on file    Years of education: Not on file    Highest education level: Not on file   Tobacco Use    Smoking status: Current Every Day Smoker     Packs/day: 1.00    Smokeless tobacco: Never Used   Substance and Sexual Activity    Alcohol use: Yes     Alcohol/week: 4.0 standard drinks     Types: 4 Cans of beer per week    Drug use: No    Sexual activity: Never       Review of Systems   Eyes: Negative for blurred vision, double vision and photophobia. Musculoskeletal: Negative for falls. Neurological: Negative for dizziness, tingling, tremors and headaches. Psychiatric/Behavioral: Positive for memory loss. Negative for depression, hallucinations, substance abuse and suicidal ideas. The patient is not nervous/anxious and does not have insomnia. Remainder of comprehensive review is negative.      Physical Exam :    Visit Vitals  /69   Pulse 62   Temp 97.5 °F (36.4 °C)   Wt 80.7 kg (178 lb)   SpO2 98%   BMI 23.48 kg/m²       General: Well defined, nourished, and groomed individual in no acute distress.    Neck: Supple, nontender, no bruits, no pain with resistance to active range of motion.    Psych: Good mood and bright affect    NEUROLOGICAL EXAMINATION:    Mental Status: Alert and oriented to person none else performed. Cranial Nerves:    II, III, IV, VI: Visual acuity grossly intact. Visual fields are normal.    Pupils are equal, round, and reactive to light and accommodation.    Extra-ocular movements are full and fluid. Fundoscopic exam was benign, no ptosis or nystagmus.    V-XII: Hearing is grossly intact. Facial features are symmetric, with normal sensation and strength. The palate rises symmetrically and the tongue protrudes midline. Sternocleidomastoids 5/5. Motor Examination: Normal tone, bulk, and strength, 5/5 muscle strength throughout. Coordination: Finger to nose was normal. No resting or intention tremor    Gait and Station: Steady while walking. Normal arm swing. No pronator drift. No muscle wasting or fasiculations noted. Results for orders placed or performed in visit on 10/28/20    COLONOSCOPY   Result Value Ref Range    Colonoscopy, External         Orders Placed This Encounter    amoxicillin (AMOXIL) 500 mg capsule    DISCONTD: bumetanide (BUMEX) 2 mg tablet     Sig: TK 1 T PO BID    risperiDONE (RisperDAL) 0.25 mg tablet     Sig: Take 1 tablet by mouth BID PRN for agitation     Dispense:  30 Tab     Refill:  5       No diagnosis found. continuing to decline dementia. Discussed and pulled up MRI images. Continue Namenda, Aricept, Adderall for treatment. Try Risperdal PRN if needed for any agitation. keep active and engaged. Being really well cared for. Keep with therapy. End goal to keep at home.              This note will not be viewable in [facility]

## 2020-12-09 DIAGNOSIS — E78.00 HYPERCHOLESTEREMIA: ICD-10-CM

## 2020-12-09 RX ORDER — ROSUVASTATIN CALCIUM 20 MG/1
TABLET, COATED ORAL
Qty: 90 TAB | Refills: 3 | Status: SHIPPED | OUTPATIENT
Start: 2020-12-09

## 2021-01-19 DIAGNOSIS — G30.9 ALZHEIMER'S DEMENTIA WITH BEHAVIORAL DISTURBANCE, UNSPECIFIED TIMING OF DEMENTIA ONSET: ICD-10-CM

## 2021-01-19 DIAGNOSIS — F02.81 ALZHEIMER'S DEMENTIA WITH BEHAVIORAL DISTURBANCE, UNSPECIFIED TIMING OF DEMENTIA ONSET: ICD-10-CM

## 2021-01-19 RX ORDER — ESCITALOPRAM OXALATE 20 MG/1
TABLET ORAL
Qty: 90 TAB | Refills: 1 | Status: SHIPPED | OUTPATIENT
Start: 2021-01-19 | End: 2021-10-18

## 2021-01-20 DIAGNOSIS — E78.00 HYPERCHOLESTEREMIA: ICD-10-CM

## 2021-01-20 RX ORDER — NIACIN 500 MG/1
TABLET, EXTENDED RELEASE ORAL
Qty: 90 TAB | Refills: 3 | Status: SHIPPED | OUTPATIENT
Start: 2021-01-20

## 2021-02-02 RX ORDER — RISPERIDONE 0.25 MG/1
TABLET, FILM COATED ORAL
Qty: 30 TAB | Refills: 5 | Status: SHIPPED | OUTPATIENT
Start: 2021-02-02 | End: 2021-03-05 | Stop reason: ALTCHOICE

## 2021-03-05 ENCOUNTER — OFFICE VISIT (OUTPATIENT)
Dept: NEUROLOGY | Age: 71
End: 2021-03-05
Payer: MEDICARE

## 2021-03-05 VITALS
TEMPERATURE: 96.8 F | BODY MASS INDEX: 23.35 KG/M2 | WEIGHT: 177 LBS | SYSTOLIC BLOOD PRESSURE: 98 MMHG | HEART RATE: 63 BPM | OXYGEN SATURATION: 98 % | DIASTOLIC BLOOD PRESSURE: 60 MMHG

## 2021-03-05 DIAGNOSIS — G30.9 ALZHEIMER'S DEMENTIA WITHOUT BEHAVIORAL DISTURBANCE, UNSPECIFIED TIMING OF DEMENTIA ONSET: Primary | ICD-10-CM

## 2021-03-05 DIAGNOSIS — F02.80 ALZHEIMER'S DEMENTIA WITHOUT BEHAVIORAL DISTURBANCE, UNSPECIFIED TIMING OF DEMENTIA ONSET: Primary | ICD-10-CM

## 2021-03-05 PROCEDURE — G8432 DEP SCR NOT DOC, RNG: HCPCS | Performed by: NURSE PRACTITIONER

## 2021-03-05 PROCEDURE — G8427 DOCREV CUR MEDS BY ELIG CLIN: HCPCS | Performed by: NURSE PRACTITIONER

## 2021-03-05 PROCEDURE — G8420 CALC BMI NORM PARAMETERS: HCPCS | Performed by: NURSE PRACTITIONER

## 2021-03-05 PROCEDURE — G8752 SYS BP LESS 140: HCPCS | Performed by: NURSE PRACTITIONER

## 2021-03-05 PROCEDURE — 1101F PT FALLS ASSESS-DOCD LE1/YR: CPT | Performed by: NURSE PRACTITIONER

## 2021-03-05 PROCEDURE — G8536 NO DOC ELDER MAL SCRN: HCPCS | Performed by: NURSE PRACTITIONER

## 2021-03-05 PROCEDURE — 99214 OFFICE O/P EST MOD 30 MIN: CPT | Performed by: NURSE PRACTITIONER

## 2021-03-05 PROCEDURE — 3017F COLORECTAL CA SCREEN DOC REV: CPT | Performed by: NURSE PRACTITIONER

## 2021-03-05 PROCEDURE — G8754 DIAS BP LESS 90: HCPCS | Performed by: NURSE PRACTITIONER

## 2021-03-05 RX ORDER — METOPROLOL SUCCINATE 50 MG/1
TABLET, EXTENDED RELEASE ORAL
Qty: 90 TAB | Refills: 3 | Status: SHIPPED | OUTPATIENT
Start: 2021-03-05

## 2021-03-05 RX ORDER — RISPERIDONE 0.5 MG/1
0.5 TABLET, FILM COATED ORAL DAILY
Qty: 30 TAB | Refills: 5 | Status: SHIPPED | OUTPATIENT
Start: 2021-03-05

## 2021-03-05 NOTE — PROGRESS NOTES
Ting Guillen is a 79 y.o. male who presents with the following  Chief Complaint   Patient presents with    Follow-up    Alzheimers     worsening per wife       HPI    Patient comes in with wife for a follow up for dementia. Things continue to get worse per report. They have noticed a decline in energy, focusing, memory all together. He is not speaking very well. He has been very off balance. He is very weak and tired. Sleeping a lot. He is not eating very well but is taking in calories. Taking medication with prompting. Continuing to decline. He was agitated still at times and is noticing risperdal once daily helped. Will be willing to try increase.      He is now fully moved in with his daughter. Has grandkids there also to keep him busy. He does like to watch tv, listen to music. Wife states he does sleep a lot. On Aricept 20 mg and Namenda 10 mg BID. On Adderall 40 mg XR daily also. May want to try come off. Having diarrhea and may be aricept. Adderall is not helping him stay awake.      He is incontinent of bowel and bladder at times.   Gets out on the deck   Wife helps with everything and ADLS And feeding. No Known Allergies    Current Outpatient Medications   Medication Sig    metoprolol succinate (TOPROL-XL) 50 mg XL tablet TAKE 1 TABLET DAILY    risperiDONE (RisperDAL) 0.5 mg tablet Take 1 Tab by mouth daily.  niacin ER (NIASPAN) 500 mg tablet TAKE 1 TABLET NIGHTLY    escitalopram oxalate (LEXAPRO) 20 mg tablet TAKE 1 TABLET BY MOUTH DAILY    rosuvastatin (CRESTOR) 20 mg tablet TAKE 1 TABLET DAILY    amoxicillin (AMOXIL) 500 mg capsule     memantine (NAMENDA) 10 mg tablet TAKE 1 TABLET TWICE A DAY    DOCOSAHEXANOIC ACID/EPA (FISH OIL PO) Take 1,200 mg by mouth daily.  PV W-O LAZARUS/FERROUS FUMARATE/FA (M-VIT PO) Take  by mouth.  aspirin (ASPIRIN) 325 mg tablet Take 325 mg by mouth daily.      No current facility-administered medications for this visit. Social History     Tobacco Use   Smoking Status Current Every Day Smoker    Packs/day: 1.00   Smokeless Tobacco Never Used       Past Medical History:   Diagnosis Date    Alcoholism ([facility]     CAD (coronary artery disease) 6/15/2010    DM (diabetes mellitus) ([facility] 6/15/2010    Falls     Heart disease     HTN (hypertension) 6/15/2010    Hypercholesteremia 6/15/2010    PTSD (post-traumatic stress disorder)     Skin cancer 6/15/2010    Smoker 6/15/2010    Snoring        Past Surgical History:   Procedure Laterality Date    [place] CARDIAC SURG PROCEDURE UNLIST      stent       Family History   Problem Relation Age of Onset    Cancer Mother     Heart Disease Other        Social History     Socioeconomic History    Marital status:      Spouse name: Not on file    Number of children: Not on file    Years of education: Not on file    Highest education level: Not on file   Tobacco Use    Smoking status: Current Every Day Smoker     Packs/day: 1.00    Smokeless tobacco: Never Used   Substance and Sexual Activity    Alcohol use: Yes     Alcohol/week: 4.0 standard drinks     Types: 4 Cans of beer per week    Drug use: No    Sexual activity: Never       Review of Systems   Eyes: Negative for blurred vision, double vision and photophobia. Gastrointestinal: Positive for diarrhea. Negative for abdominal pain, nausea and vomiting. Neurological: Negative for dizziness, tingling and headaches. Psychiatric/Behavioral: Positive for memory loss. Negative for depression, hallucinations, substance abuse and suicidal ideas. The patient is not nervous/anxious and does not have insomnia. Remainder of comprehensive review is negative.      Physical Exam :    Visit Vitals  BP 98/60   Pulse 63   Temp 96.8 °F (36 °C)   Wt 80.3 kg (177 lb)   SpO2 98%   BMI 23.35 kg/m²       General: Well defined, nourished, and groomed individual in no acute distress.    Neck: Supple, nontender, no bruits, no pain with resistance to active range of motion.    Musculoskeletal: Extremities revealed no edema and had full range of motion of joints.    Psych: Good mood and bright affect    NEUROLOGICAL EXAMINATION:    Mental Status: Alert and oriented to person not place or time. Cranial Nerves:    II, III, IV, VI: Visual acuity grossly intact. Visual fields are normal.    Pupils are equal, round, and reactive to light and accommodation.    Extra-ocular movements are full and fluid. Fundoscopic exam was benign, no ptosis or nystagmus.    V-XII: Hearing is grossly intact. Facial features are symmetric, with normal sensation and strength. The palate rises symmetrically and the tongue protrudes midline. Sternocleidomastoids 5/5. Motor Examination: Normal tone, bulk, and strength, 5/5 muscle strength throughout. Coordination: Finger to nose was normal. No resting or intention tremor            Results for orders placed or performed in visit on 10/28/20    COLONOSCOPY   Result Value Ref Range    Colonoscopy, External         Orders Placed This Encounter    risperiDONE (RisperDAL) 0.5 mg tablet     Sig: Take 1 Tab by mouth daily. Dispense:  30 Tab     Refill:  5       No diagnosis found. AD continues to progress  She is worried about his diarrhea. Could be Aricept so we will stop and see how this goes. Keep Namenda. Stop Adderall as this has not helped either. Continue to try to keep him physically and mentally active. Keep safety   Being well cared for. Increase Risperdal to 0.5 mg morning to help with agitation.                This note will not be viewable in [facility]

## 2021-03-05 NOTE — PATIENT INSTRUCTIONS
Stop the Aricept (Donepezil) and see how the diarrhea is. Start taking 2 tablets (0.5 mg)  For agitation

## 2021-03-13 DIAGNOSIS — G30.0 EARLY ONSET ALZHEIMER'S DEMENTIA WITHOUT BEHAVIORAL DISTURBANCE (HCC): ICD-10-CM

## 2021-03-13 DIAGNOSIS — F02.80 EARLY ONSET ALZHEIMER'S DEMENTIA WITHOUT BEHAVIORAL DISTURBANCE (HCC): ICD-10-CM

## 2021-03-15 RX ORDER — DONEPEZIL HYDROCHLORIDE 10 MG/1
TABLET, FILM COATED ORAL
Qty: 180 TAB | Refills: 3 | Status: SHIPPED | OUTPATIENT
Start: 2021-03-15

## 2021-03-17 DIAGNOSIS — F03.90 DEMENTIA WITHOUT BEHAVIORAL DISTURBANCE, UNSPECIFIED DEMENTIA TYPE: ICD-10-CM

## 2021-03-17 RX ORDER — MEMANTINE HYDROCHLORIDE 10 MG/1
TABLET ORAL
Qty: 180 TAB | Refills: 3 | Status: SHIPPED | OUTPATIENT
Start: 2021-03-17

## 2021-06-04 ENCOUNTER — TELEPHONE (OUTPATIENT)
Dept: NEUROLOGY | Age: 71
End: 2021-06-04

## 2021-06-04 NOTE — TELEPHONE ENCOUNTER
----- Message from [facility] sent at 6/4/2021 10:47 AM EDT -----  Regarding: RAYMOND Rodríguez/Telephone     Caller's first and last name: [facility]  Reason for call: change in behavior with the patient  Callback required yes/no and why:Yes  Best contact number(s):[phone]  Details to clarify the request:Magdalena said her  has been opening  child proof doors and trying to escape along with a lot of agitation and would like to discuss this with RYAMOND Rodríguez.

## 2021-06-07 NOTE — TELEPHONE ENCOUNTER
Can you let her know we can try to get him on something to help     We can also send hospice maybe to see if he can be evaluated for that. They would help with day to day care. Does not mean terminal care, they can just help with things like caring for him day to day, etc.   With goals of keeping him at home.        Sowmya banuelos know   Thanks

## 2021-06-08 NOTE — TELEPHONE ENCOUNTER
Sent paperwork to [facility] [place] and they should reach out to wife. If you can let her know. We did blood work but is she willing to get a CT head to rule out any other causes in things getting worse such as recent bleed, stroke, etc.   Or does she want to hold on that?      Let me know and we can order

## 2021-06-10 NOTE — TELEPHONE ENCOUNTER
Patient wife stated they just signed up for hospice care. She wanted you to know that he was taken off all of his medications and left him on one medication. She will give us a call back and let us know which medication that is.

## 2021-06-15 NOTE — PROGRESS NOTES
Please let wife know that his labs overall lab great but his PSA has gone up quite a bit, still in normal range but made big jump and even small changes can mean a problem. Does he have urologist? Does he have symptoms of difficulty urinating?  Pamela Reddy Pt presents to the ED c/o  panic attack yesterday.  He reports that after the panic attack he began having abdominal pain from his lower abdomen all the way up to his chest.  He reports nausea but no vomiting.  He denies shortness of breath.  He denies SI or HI.     On exam,   Awake and alert, no acute distress.  There is mild periumbilical tenderness without rebound or guarding.  There is no right lower quadrant tenderness.  Abdomen is nondistended.     Plan: Labs reviewed and reassuring.  I have attempted p.o. challenge him.  If he tolerates p.o. challenge I expect he can be safely discharged home with PCP follow-up.      I wore a mask, face shield, and gloves during this patient encounter.  Patient also wearing a surgical mask.  Hand hygeine performed before and after seeing the patient.     Attestation:  The RAMANDEEP and I have discussed this patient's history, physical exam, and treatment plan.  I have reviewed the documentation and personally had a face to face interaction with the patient. I affirm the documentation and agree with the treatment and plan.  The attached note describes my personal findings.            Abraham Sy MD  06/15/21 [id number]

## 2021-10-17 DIAGNOSIS — G30.9 ALZHEIMER'S DEMENTIA WITH BEHAVIORAL DISTURBANCE, UNSPECIFIED TIMING OF DEMENTIA ONSET: ICD-10-CM

## 2021-10-17 DIAGNOSIS — F02.81 ALZHEIMER'S DEMENTIA WITH BEHAVIORAL DISTURBANCE, UNSPECIFIED TIMING OF DEMENTIA ONSET: ICD-10-CM

## 2021-10-18 RX ORDER — ESCITALOPRAM OXALATE 20 MG/1
TABLET ORAL
Qty: 90 TABLET | Refills: 1 | Status: SHIPPED | OUTPATIENT
Start: 2021-10-18

## 2022-01-14 ENCOUNTER — TELEPHONE (OUTPATIENT)
Dept: NEUROLOGY | Age: 72
End: 2022-01-14

## 2022-01-14 NOTE — TELEPHONE ENCOUNTER
Patients wife calling wanting to let lazara know that Mr. Ez Bynum passed away on 12/3/2021.  Please advise

## 2022-03-19 PROBLEM — G30.9 ALZHEIMER'S DEMENTIA WITH BEHAVIORAL DISTURBANCE (HCC): Status: ACTIVE | Noted: 2018-08-20

## 2022-03-19 PROBLEM — Z71.89 ADVANCED CARE PLANNING/COUNSELING DISCUSSION: Status: ACTIVE | Noted: 2017-08-18

## 2022-03-19 PROBLEM — F02.818 ALZHEIMER'S DEMENTIA WITH BEHAVIORAL DISTURBANCE (HCC): Status: ACTIVE | Noted: 2018-08-20

## 2024-08-19 NOTE — PROGRESS NOTES
The following message was sent to pt via [employer] portal in reference to lab results:    Good evening Mr. Loida Enriquez,     Attached are the results of your most recent lab work. I have the following recommendations:    1. Your CBC which looks at your white blood cells, red blood cells, and hemoglobin came back looking normal minus your MCV being elevated. This can occur in patients who have vitamin B12 deficiency. Are you able to come back to the office for some blood work only to check a vitamin B12? Let me know and if so I will leave orders for blood work at the  for you. 2. Your metabolic panel which looks at your blood glucose, liver function, and kidney function looks good minus your glucose being elevated from your diabetes. 3. Your hemoglobin a1c shows that your diabetes is very well controlled. Continue your current medication regimen as prescribed and follow up every 3 months for routine lab work to monitor this closely. 4. Great news. Your pro BNP test is normal suggesting you are not having a acute congestive heart failure issue. Hopefully changing you to the bumex twice a day helps improve the leg swelling. I still recommend you establish care with cardiology like we talked about. 5. Your cholesterol came back looking pretty good. Your triglycerides are slightly elevated. The best way to bring that number down is to incorporate more omega 3's into your diet. Here are some suggestions on how to do that:  - eat 2-3 serving of fish like salmon and trout per week  - eat lots of fresh dark leafy green vegetables  - incorporate more garlic into your diet    Lets recheck these labs in 3 months.  Please do not hesitate to call me or schedule an appointment to be seen if you need anything else in the meantime :)  PERLA Almaguer Communicate fall risk and risk factors to all staff, patient, and family/Provide visual cue: yellow wristband, yellow gown, etc/Reinforce activity limits and safety measures with patient and family/Call bell, personal items and telephone in reach/Instruct patient to call for assistance before getting out of bed/chair/stretcher/Non-slip footwear applied when patient is off stretcher/[place] to call system/Physically safe environment - no spills, clutter or unnecessary equipment/Purposeful Proactive Rounding/Room/bathroom lighting operational, light cord in reach